# Patient Record
Sex: MALE | Race: WHITE | Employment: OTHER | URBAN - NONMETROPOLITAN AREA
[De-identification: names, ages, dates, MRNs, and addresses within clinical notes are randomized per-mention and may not be internally consistent; named-entity substitution may affect disease eponyms.]

---

## 2017-01-19 ENCOUNTER — PROCEDURE VISIT (OUTPATIENT)
Dept: CARDIOLOGY | Age: 77
End: 2017-01-19

## 2017-01-19 DIAGNOSIS — Z95.0 S/P CARDIAC PACEMAKER PROCEDURE: Primary | ICD-10-CM

## 2017-01-19 PROCEDURE — 93296 REM INTERROG EVL PM/IDS: CPT | Performed by: INTERNAL MEDICINE

## 2017-01-19 PROCEDURE — 93294 REM INTERROG EVL PM/LDLS PM: CPT | Performed by: INTERNAL MEDICINE

## 2017-04-13 LAB
ALBUMIN SERPL-MCNC: 4.3 G/DL (ref 3.2–5.3)
ALK PHOSPHATASE: 93 IU/L (ref 35–121)
ALT SERPL-CCNC: 27 IU/L (ref 5–59)
ANION GAP SERPL CALCULATED.3IONS-SCNC: 11 MMOL/L
AST SERPL-CCNC: 31 IU/L (ref 10–42)
BILIRUB SERPL-MCNC: 1.1 MG/DL (ref 0.2–1.3)
BILIRUBIN DIRECT: 0.3 MG/DL (ref 0–0.2)
BUN BLDV-MCNC: 17 MG/DL (ref 10–25)
CALCIUM SERPL-MCNC: 9.8 MG/DL (ref 8.7–10.8)
CHLORIDE BLD-SCNC: 107 MMOL/L (ref 95–111)
CHOLESTEROL/HDL RATIO: 2.3
CHOLESTEROL: 115 MG/DL
CO2: 31 MMOL/L (ref 21–32)
CREAT SERPL-MCNC: 1 MG/DL (ref 0.7–1.5)
EGFR AFRICAN AMERICAN: 88
EGFR IF NONAFRICAN AMERICAN: 73
GLUCOSE: 89 MG/DL (ref 70–100)
HDLC SERPL-MCNC: 51 MG/DL (ref 40–60)
LDL CHOLESTEROL CALCULATED: 57 MG/DL
LDL/HDL RATIO: 1.1
MAGNESIUM: 2.3 MG/DL (ref 1.7–2.4)
POTASSIUM SERPL-SCNC: 5.6 MMOL/L (ref 3.5–5.4)
SODIUM BLD-SCNC: 143 MMOL/L (ref 134–147)
TOTAL PROTEIN: 6.5 G/DL (ref 5.8–8)
TRIGL SERPL-MCNC: 36 MG/DL
VLDLC SERPL CALC-MCNC: 7 MG/DL

## 2017-04-20 ENCOUNTER — PROCEDURE VISIT (OUTPATIENT)
Dept: CARDIOLOGY | Age: 77
End: 2017-04-20

## 2017-04-20 DIAGNOSIS — Z95.0 S/P CARDIAC PACEMAKER PROCEDURE: Primary | ICD-10-CM

## 2017-04-20 PROCEDURE — 93296 REM INTERROG EVL PM/IDS: CPT | Performed by: INTERNAL MEDICINE

## 2017-04-20 PROCEDURE — 93294 REM INTERROG EVL PM/LDLS PM: CPT | Performed by: INTERNAL MEDICINE

## 2017-04-25 ENCOUNTER — OFFICE VISIT (OUTPATIENT)
Dept: CARDIOLOGY | Age: 77
End: 2017-04-25

## 2017-04-25 VITALS
WEIGHT: 220.8 LBS | HEIGHT: 71 IN | SYSTOLIC BLOOD PRESSURE: 168 MMHG | HEART RATE: 64 BPM | DIASTOLIC BLOOD PRESSURE: 88 MMHG | BODY MASS INDEX: 30.91 KG/M2

## 2017-04-25 DIAGNOSIS — I50.32 DIASTOLIC CHF, CHRONIC (HCC): Primary | ICD-10-CM

## 2017-04-25 DIAGNOSIS — E78.00 PURE HYPERCHOLESTEROLEMIA: ICD-10-CM

## 2017-04-25 DIAGNOSIS — I10 ESSENTIAL HYPERTENSION: ICD-10-CM

## 2017-04-25 DIAGNOSIS — I49.5 SSS (SICK SINUS SYNDROME) (HCC): ICD-10-CM

## 2017-04-25 DIAGNOSIS — I48.19 PERSISTENT ATRIAL FIBRILLATION (HCC): ICD-10-CM

## 2017-04-25 DIAGNOSIS — Z95.0 S/P CARDIAC PACEMAKER PROCEDURE: ICD-10-CM

## 2017-04-25 DIAGNOSIS — E87.5 HYPERKALEMIA: ICD-10-CM

## 2017-04-25 PROCEDURE — 1036F TOBACCO NON-USER: CPT | Performed by: INTERNAL MEDICINE

## 2017-04-25 PROCEDURE — 4040F PNEUMOC VAC/ADMIN/RCVD: CPT | Performed by: INTERNAL MEDICINE

## 2017-04-25 PROCEDURE — 1123F ACP DISCUSS/DSCN MKR DOCD: CPT | Performed by: INTERNAL MEDICINE

## 2017-04-25 PROCEDURE — 3017F COLORECTAL CA SCREEN DOC REV: CPT | Performed by: INTERNAL MEDICINE

## 2017-04-25 PROCEDURE — 99214 OFFICE O/P EST MOD 30 MIN: CPT | Performed by: INTERNAL MEDICINE

## 2017-04-25 PROCEDURE — G8419 CALC BMI OUT NRM PARAM NOF/U: HCPCS | Performed by: INTERNAL MEDICINE

## 2017-04-25 PROCEDURE — G8427 DOCREV CUR MEDS BY ELIG CLIN: HCPCS | Performed by: INTERNAL MEDICINE

## 2017-04-25 RX ORDER — FUROSEMIDE 20 MG/1
20 TABLET ORAL DAILY
Qty: 60 TABLET | Refills: 5
Start: 2017-04-25 | End: 2017-09-26 | Stop reason: SDUPTHER

## 2017-04-27 ENCOUNTER — TELEPHONE (OUTPATIENT)
Dept: CARDIOLOGY | Age: 77
End: 2017-04-27

## 2017-05-02 LAB
ANION GAP SERPL CALCULATED.3IONS-SCNC: 12 MMOL/L
BUN BLDV-MCNC: 19 MG/DL
BUN BLDV-MCNC: 19 MG/DL (ref 10–25)
CALCIUM SERPL-MCNC: 9.9 MG/DL
CALCIUM SERPL-MCNC: 9.9 MG/DL (ref 8.7–10.8)
CHLORIDE BLD-SCNC: 106 MMOL/L
CHLORIDE BLD-SCNC: 106 MMOL/L (ref 95–111)
CO2: 30 MMOL/L
CO2: 30 MMOL/L (ref 21–32)
CREAT SERPL-MCNC: 1 MG/DL
CREAT SERPL-MCNC: 1 MG/DL (ref 0.7–1.5)
EGFR AFRICAN AMERICAN: 88
EGFR IF NONAFRICAN AMERICAN: 73
GFR CALCULATED: NORMAL
GLUCOSE BLD-MCNC: 93 MG/DL
GLUCOSE: 93 MG/DL (ref 70–100)
POTASSIUM SERPL-SCNC: 4.8 MMOL/L
POTASSIUM SERPL-SCNC: 4.8 MMOL/L (ref 3.5–5.4)
SODIUM BLD-SCNC: 143 MMOL/L
SODIUM BLD-SCNC: 143 MMOL/L (ref 134–147)

## 2017-05-19 RX ORDER — APIXABAN 5 MG/1
TABLET, FILM COATED ORAL
Qty: 180 TABLET | Refills: 0 | Status: SHIPPED | OUTPATIENT
Start: 2017-05-19 | End: 2017-11-06 | Stop reason: SDUPTHER

## 2017-06-28 DIAGNOSIS — E78.5 HYPERLIPIDEMIA: ICD-10-CM

## 2017-06-28 RX ORDER — METOPROLOL TARTRATE 50 MG/1
TABLET, FILM COATED ORAL
Qty: 180 TABLET | Refills: 0 | Status: SHIPPED | OUTPATIENT
Start: 2017-06-28 | End: 2017-09-26 | Stop reason: SDUPTHER

## 2017-06-28 RX ORDER — POTASSIUM CHLORIDE 20 MEQ/1
TABLET, EXTENDED RELEASE ORAL
Qty: 90 TABLET | Refills: 0 | Status: SHIPPED | OUTPATIENT
Start: 2017-06-28 | End: 2017-08-28 | Stop reason: ALTCHOICE

## 2017-06-28 RX ORDER — SIMVASTATIN 10 MG
TABLET ORAL
Qty: 90 TABLET | Refills: 0 | Status: SHIPPED | OUTPATIENT
Start: 2017-06-28 | End: 2017-09-26 | Stop reason: SDUPTHER

## 2017-06-28 RX ORDER — FUROSEMIDE 20 MG/1
TABLET ORAL
Qty: 90 TABLET | Refills: 0 | Status: SHIPPED | OUTPATIENT
Start: 2017-06-28 | End: 2017-08-28 | Stop reason: SDUPTHER

## 2017-07-12 ENCOUNTER — TELEPHONE (OUTPATIENT)
Dept: CARDIOLOGY | Age: 77
End: 2017-07-12

## 2017-07-26 ENCOUNTER — PROCEDURE VISIT (OUTPATIENT)
Dept: CARDIOLOGY CLINIC | Age: 77
End: 2017-07-26
Payer: MEDICARE

## 2017-07-26 DIAGNOSIS — Z95.0 S/P CARDIAC PACEMAKER PROCEDURE: Primary | ICD-10-CM

## 2017-07-26 PROCEDURE — 93294 REM INTERROG EVL PM/LDLS PM: CPT | Performed by: INTERNAL MEDICINE

## 2017-07-26 PROCEDURE — 93296 REM INTERROG EVL PM/IDS: CPT | Performed by: INTERNAL MEDICINE

## 2017-08-01 ENCOUNTER — TELEPHONE (OUTPATIENT)
Dept: FAMILY MEDICINE CLINIC | Age: 77
End: 2017-08-01

## 2017-08-01 RX ORDER — FAMCICLOVIR 500 MG/1
500 TABLET, FILM COATED ORAL 3 TIMES DAILY PRN
Qty: 270 TABLET | Refills: 1 | Status: SHIPPED | OUTPATIENT
Start: 2017-08-01 | End: 2019-06-04

## 2017-08-28 ENCOUNTER — OFFICE VISIT (OUTPATIENT)
Dept: CARDIOLOGY CLINIC | Age: 77
End: 2017-08-28
Payer: MEDICARE

## 2017-08-28 VITALS
SYSTOLIC BLOOD PRESSURE: 168 MMHG | HEIGHT: 71 IN | BODY MASS INDEX: 29.4 KG/M2 | HEART RATE: 56 BPM | WEIGHT: 210 LBS | DIASTOLIC BLOOD PRESSURE: 80 MMHG

## 2017-08-28 DIAGNOSIS — Z95.0 S/P CARDIAC PACEMAKER PROCEDURE: ICD-10-CM

## 2017-08-28 DIAGNOSIS — I50.31 DIASTOLIC CHF, ACUTE (HCC): Primary | ICD-10-CM

## 2017-08-28 DIAGNOSIS — I49.5 SSS (SICK SINUS SYNDROME) (HCC): ICD-10-CM

## 2017-08-28 DIAGNOSIS — I48.19 PERSISTENT ATRIAL FIBRILLATION (HCC): ICD-10-CM

## 2017-08-28 DIAGNOSIS — E78.00 PURE HYPERCHOLESTEROLEMIA: ICD-10-CM

## 2017-08-28 DIAGNOSIS — I10 ESSENTIAL HYPERTENSION: ICD-10-CM

## 2017-08-28 DIAGNOSIS — R06.02 SOB (SHORTNESS OF BREATH) ON EXERTION: ICD-10-CM

## 2017-08-28 PROCEDURE — G8417 CALC BMI ABV UP PARAM F/U: HCPCS | Performed by: INTERNAL MEDICINE

## 2017-08-28 PROCEDURE — G8427 DOCREV CUR MEDS BY ELIG CLIN: HCPCS | Performed by: INTERNAL MEDICINE

## 2017-08-28 PROCEDURE — 4040F PNEUMOC VAC/ADMIN/RCVD: CPT | Performed by: INTERNAL MEDICINE

## 2017-08-28 PROCEDURE — 99214 OFFICE O/P EST MOD 30 MIN: CPT | Performed by: INTERNAL MEDICINE

## 2017-08-28 PROCEDURE — 1036F TOBACCO NON-USER: CPT | Performed by: INTERNAL MEDICINE

## 2017-08-28 PROCEDURE — 1123F ACP DISCUSS/DSCN MKR DOCD: CPT | Performed by: INTERNAL MEDICINE

## 2017-08-28 PROCEDURE — 3017F COLORECTAL CA SCREEN DOC REV: CPT | Performed by: INTERNAL MEDICINE

## 2017-08-29 LAB
ANION GAP SERPL CALCULATED.3IONS-SCNC: 16 MMOL/L
BUN BLDV-MCNC: 14 MG/DL (ref 10–25)
CALCIUM SERPL-MCNC: 9.5 MG/DL (ref 8.7–10.8)
CHLORIDE BLD-SCNC: 102 MMOL/L (ref 95–111)
CO2: 29 MMOL/L (ref 21–32)
CREAT SERPL-MCNC: 0.9 MG/DL (ref 0.7–1.5)
EGFR AFRICAN AMERICAN: 100
EGFR IF NONAFRICAN AMERICAN: 82
GLUCOSE: 84 MG/DL (ref 70–100)
POTASSIUM SERPL-SCNC: 4.4 MMOL/L (ref 3.5–5.4)
SODIUM BLD-SCNC: 143 MMOL/L (ref 134–147)

## 2017-09-26 DIAGNOSIS — E78.5 HYPERLIPIDEMIA: ICD-10-CM

## 2017-09-26 DIAGNOSIS — I10 ESSENTIAL HYPERTENSION: ICD-10-CM

## 2017-09-26 RX ORDER — METOPROLOL TARTRATE 50 MG/1
TABLET, FILM COATED ORAL
Qty: 180 TABLET | Refills: 2 | Status: SHIPPED | OUTPATIENT
Start: 2017-09-26 | End: 2018-05-01 | Stop reason: SDUPTHER

## 2017-09-26 RX ORDER — FUROSEMIDE 20 MG/1
TABLET ORAL
Qty: 90 TABLET | Refills: 2 | Status: SHIPPED | OUTPATIENT
Start: 2017-09-26 | End: 2018-05-01 | Stop reason: SDUPTHER

## 2017-09-26 RX ORDER — VALSARTAN 80 MG/1
TABLET ORAL
Qty: 90 TABLET | Refills: 3 | Status: SHIPPED | OUTPATIENT
Start: 2017-09-26 | End: 2018-05-01 | Stop reason: SDUPTHER

## 2017-09-26 RX ORDER — SIMVASTATIN 10 MG
TABLET ORAL
Qty: 90 TABLET | Refills: 2 | Status: SHIPPED | OUTPATIENT
Start: 2017-09-26 | End: 2018-05-01 | Stop reason: SDUPTHER

## 2017-10-16 ENCOUNTER — OFFICE VISIT (OUTPATIENT)
Dept: PULMONOLOGY | Age: 77
End: 2017-10-16
Payer: MEDICARE

## 2017-10-16 VITALS
SYSTOLIC BLOOD PRESSURE: 137 MMHG | BODY MASS INDEX: 30.52 KG/M2 | DIASTOLIC BLOOD PRESSURE: 78 MMHG | HEIGHT: 71 IN | RESPIRATION RATE: 16 BRPM | HEART RATE: 69 BPM | WEIGHT: 218 LBS | OXYGEN SATURATION: 96 %

## 2017-10-16 DIAGNOSIS — G47.33 OBSTRUCTIVE SLEEP APNEA ON CPAP: Primary | ICD-10-CM

## 2017-10-16 DIAGNOSIS — Z99.89 OBSTRUCTIVE SLEEP APNEA ON CPAP: Primary | ICD-10-CM

## 2017-10-16 PROCEDURE — 99213 OFFICE O/P EST LOW 20 MIN: CPT | Performed by: PHYSICIAN ASSISTANT

## 2017-10-16 PROCEDURE — G8417 CALC BMI ABV UP PARAM F/U: HCPCS | Performed by: PHYSICIAN ASSISTANT

## 2017-10-16 PROCEDURE — G8484 FLU IMMUNIZE NO ADMIN: HCPCS | Performed by: PHYSICIAN ASSISTANT

## 2017-10-16 PROCEDURE — 3017F COLORECTAL CA SCREEN DOC REV: CPT | Performed by: PHYSICIAN ASSISTANT

## 2017-10-16 PROCEDURE — 4040F PNEUMOC VAC/ADMIN/RCVD: CPT | Performed by: PHYSICIAN ASSISTANT

## 2017-10-16 PROCEDURE — G8427 DOCREV CUR MEDS BY ELIG CLIN: HCPCS | Performed by: PHYSICIAN ASSISTANT

## 2017-10-16 PROCEDURE — 1123F ACP DISCUSS/DSCN MKR DOCD: CPT | Performed by: PHYSICIAN ASSISTANT

## 2017-10-16 PROCEDURE — 1036F TOBACCO NON-USER: CPT | Performed by: PHYSICIAN ASSISTANT

## 2017-10-16 NOTE — PROGRESS NOTES
[facility]      Daija Washington                                         [id number]  10/16/2017   Chief Complaint   Patient presents with    Sleep Apnea     LOVE on CPAP- 13 mth f/u with D/L. Needs new machine. Tank leaks. Pt of Dr. Christie Mireles    PAP Download:   Original or initial  AHI: 19.6     Date of initial study: 8/6/13    [x] Compliant  100%   []  Noncompliant 0 %     PAP Type CPAP   Level  11.0 cmH2O   Avg Hrs/Day 7:59:43  AHI: 0.2   Recorded compliance dates, 9/16/17 to 10/15/17  Machine/Mfg: Respironics Interface: Nasal    Provider:  [x][facility]  [][facility]  [][facility]  [][facility]         [][facility] []Other:   Neck Size: 17.25  Mallampati 3  ESS: 8    Here is a scan of the most recent download:              Presentation:   Madelyn Grewal presents for sleep medicine follow up for obstructive sleep apnea  Since the last visit, Madelyn Grewal is doing reasonably well with their sleep machine. Other comments: He is doing well with PAP. Tank keeps leaking. His machine is loud. Equipment issues: The pressure is  acceptable, the mask is acceptable and he  is  using the humidity. Progress History:   Since last visit any new medical issues? No  New ER or hospitlal visits? No  Any new or changes in medicines? No  Any new sleep medicines? No    Sleep issues:  Do you feel better? Yes  More rested? Yes   Better concentration? yes      Past Medical History:   Diagnosis Date    Adenomatous colon polyp 2012    Alzheimer disease     early stage    Antral gastritis 7/2012    Arthritis     Atrial fibrillation ([facility] 2013    AV block, [place] 1 10/31/2013    Blood transfusion reaction     Cancer ([facility] [address] 2010    SKIN CANCER NECK    CHF (congestive heart failure) ([facility] [address]     GERD with stricture 7/2012    Hyperlipidemia 2008    Hypertension 2005    Nausea & vomiting     LOVE on CPAP     Rheumatic fever 1950's    S/P cardiac pacemaker procedure: 10/31/2013: [address] Dual chamber.

## 2017-10-18 ENCOUNTER — NURSE ONLY (OUTPATIENT)
Dept: CARDIOLOGY CLINIC | Age: 77
End: 2017-10-18
Payer: MEDICARE

## 2017-10-18 DIAGNOSIS — Z95.0 S/P CARDIAC PACEMAKER PROCEDURE: Primary | ICD-10-CM

## 2017-10-18 PROCEDURE — 93279 PRGRMG DEV EVAL PM/LDLS PM: CPT | Performed by: INTERNAL MEDICINE

## 2017-10-18 NOTE — PROGRESS NOTES
[place] pt  Dual pacer programmed VVI for chronic at fib/eliquis  imped 706 threshold 1.1 @ 0.4  100% paced     Has latitude monitoring at home, headed to [place]

## 2018-01-29 ENCOUNTER — PROCEDURE VISIT (OUTPATIENT)
Dept: CARDIOLOGY CLINIC | Age: 78
End: 2018-01-29
Payer: MEDICARE

## 2018-01-29 DIAGNOSIS — Z95.0 S/P CARDIAC PACEMAKER PROCEDURE: Primary | ICD-10-CM

## 2018-01-29 PROCEDURE — 93296 REM INTERROG EVL PM/IDS: CPT | Performed by: INTERNAL MEDICINE

## 2018-01-29 PROCEDURE — 93294 REM INTERROG EVL PM/LDLS PM: CPT | Performed by: INTERNAL MEDICINE

## 2018-04-16 DIAGNOSIS — K29.50 ANTRAL GASTRITIS: ICD-10-CM

## 2018-04-16 RX ORDER — OMEPRAZOLE 20 MG/1
40 CAPSULE, DELAYED RELEASE ORAL DAILY
Qty: 90 CAPSULE | Refills: 3 | Status: SHIPPED | OUTPATIENT
Start: 2018-04-16 | End: 2018-05-02 | Stop reason: SDUPTHER

## 2018-05-01 ENCOUNTER — OFFICE VISIT (OUTPATIENT)
Dept: CARDIOLOGY CLINIC | Age: 78
End: 2018-05-01
Payer: MEDICARE

## 2018-05-01 ENCOUNTER — OFFICE VISIT (OUTPATIENT)
Dept: FAMILY MEDICINE CLINIC | Age: 78
End: 2018-05-01

## 2018-05-01 VITALS
BODY MASS INDEX: 32.34 KG/M2 | SYSTOLIC BLOOD PRESSURE: 136 MMHG | WEIGHT: 231 LBS | DIASTOLIC BLOOD PRESSURE: 88 MMHG | HEIGHT: 71 IN | HEART RATE: 60 BPM

## 2018-05-01 VITALS
RESPIRATION RATE: 16 BRPM | HEIGHT: 71 IN | WEIGHT: 229 LBS | SYSTOLIC BLOOD PRESSURE: 132 MMHG | BODY MASS INDEX: 32.06 KG/M2 | DIASTOLIC BLOOD PRESSURE: 60 MMHG | HEART RATE: 72 BPM

## 2018-05-01 DIAGNOSIS — R06.02 SOB (SHORTNESS OF BREATH) ON EXERTION: ICD-10-CM

## 2018-05-01 DIAGNOSIS — I50.32 DIASTOLIC CHF, CHRONIC (HCC): ICD-10-CM

## 2018-05-01 DIAGNOSIS — Z13.6 SCREENING FOR AAA (ABDOMINAL AORTIC ANEURYSM): ICD-10-CM

## 2018-05-01 DIAGNOSIS — I50.31 DIASTOLIC CHF, ACUTE (HCC): Primary | ICD-10-CM

## 2018-05-01 DIAGNOSIS — E78.00 PURE HYPERCHOLESTEROLEMIA: ICD-10-CM

## 2018-05-01 DIAGNOSIS — Z00.00 ROUTINE GENERAL MEDICAL EXAMINATION AT A HEALTH CARE FACILITY: Primary | ICD-10-CM

## 2018-05-01 DIAGNOSIS — I48.20 CHRONIC ATRIAL FIBRILLATION (HCC): ICD-10-CM

## 2018-05-01 DIAGNOSIS — Z95.0 S/P CARDIAC PACEMAKER PROCEDURE: ICD-10-CM

## 2018-05-01 DIAGNOSIS — I10 ESSENTIAL HYPERTENSION: ICD-10-CM

## 2018-05-01 PROCEDURE — 99214 OFFICE O/P EST MOD 30 MIN: CPT | Performed by: INTERNAL MEDICINE

## 2018-05-01 PROCEDURE — 93000 ELECTROCARDIOGRAM COMPLETE: CPT | Performed by: INTERNAL MEDICINE

## 2018-05-01 PROCEDURE — G0438 PPPS, INITIAL VISIT: HCPCS | Performed by: FAMILY MEDICINE

## 2018-05-01 RX ORDER — FUROSEMIDE 20 MG/1
TABLET ORAL
Qty: 90 TABLET | Refills: 3 | Status: SHIPPED | OUTPATIENT
Start: 2018-05-01 | End: 2020-12-17 | Stop reason: SDUPTHER

## 2018-05-01 RX ORDER — VALSARTAN 80 MG/1
TABLET ORAL
Qty: 90 TABLET | Refills: 3 | Status: SHIPPED | OUTPATIENT
Start: 2018-05-01 | End: 2018-07-23 | Stop reason: ALTCHOICE

## 2018-05-01 RX ORDER — GLUCOSAMINE/CHONDR SU A SOD 750-600 MG
TABLET ORAL
COMMUNITY
End: 2019-04-12

## 2018-05-01 RX ORDER — POTASSIUM CHLORIDE 20 MEQ/1
20 TABLET, EXTENDED RELEASE ORAL 2 TIMES DAILY
COMMUNITY
End: 2018-05-01 | Stop reason: ALTCHOICE

## 2018-05-01 RX ORDER — METOPROLOL TARTRATE 50 MG/1
TABLET, FILM COATED ORAL
Qty: 180 TABLET | Refills: 3 | Status: SHIPPED | OUTPATIENT
Start: 2018-05-01 | End: 2019-05-27 | Stop reason: SDUPTHER

## 2018-05-01 RX ORDER — SIMVASTATIN 10 MG
TABLET ORAL
Qty: 90 TABLET | Refills: 3 | Status: SHIPPED | OUTPATIENT
Start: 2018-05-01 | End: 2019-05-27 | Stop reason: SDUPTHER

## 2018-05-01 ASSESSMENT — LIFESTYLE VARIABLES
AUDIT-C TOTAL SCORE: 4
HOW OFTEN DO YOU HAVE A DRINK CONTAINING ALCOHOL: 4
HAVE YOU OR SOMEONE ELSE BEEN INJURED AS A RESULT OF YOUR DRINKING: 0
HOW OFTEN DURING THE LAST YEAR HAVE YOU FOUND THAT YOU WERE NOT ABLE TO STOP DRINKING ONCE YOU HAD STARTED: 0
HAS A RELATIVE, FRIEND, DOCTOR, OR ANOTHER HEALTH PROFESSIONAL EXPRESSED CONCERN ABOUT YOUR DRINKING OR SUGGESTED YOU CUT DOWN: 0
HOW MANY STANDARD DRINKS CONTAINING ALCOHOL DO YOU HAVE ON A TYPICAL DAY: 0
HOW OFTEN DO YOU HAVE SIX OR MORE DRINKS ON ONE OCCASION: 0
HOW OFTEN DURING THE LAST YEAR HAVE YOU HAD A FEELING OF GUILT OR REMORSE AFTER DRINKING: 0
HOW OFTEN DURING THE LAST YEAR HAVE YOU NEEDED AN ALCOHOLIC DRINK FIRST THING IN THE MORNING TO GET YOURSELF GOING AFTER A NIGHT OF HEAVY DRINKING: 0
HOW OFTEN DURING THE LAST YEAR HAVE YOU BEEN UNABLE TO REMEMBER WHAT HAPPENED THE NIGHT BEFORE BECAUSE YOU HAD BEEN DRINKING: 0
AUDIT TOTAL SCORE: 4
HOW OFTEN DURING THE LAST YEAR HAVE YOU FAILED TO DO WHAT WAS NORMALLY EXPECTED FROM YOU BECAUSE OF DRINKING: 0

## 2018-05-01 ASSESSMENT — ANXIETY QUESTIONNAIRES: GAD7 TOTAL SCORE: 0

## 2018-05-01 ASSESSMENT — PATIENT HEALTH QUESTIONNAIRE - PHQ9: SUM OF ALL RESPONSES TO PHQ QUESTIONS 1-9: 0

## 2018-05-02 ENCOUNTER — PROCEDURE VISIT (OUTPATIENT)
Dept: CARDIOLOGY CLINIC | Age: 78
End: 2018-05-02

## 2018-05-02 DIAGNOSIS — Z95.0 S/P CARDIAC PACEMAKER PROCEDURE: Primary | ICD-10-CM

## 2018-05-02 DIAGNOSIS — K29.50 ANTRAL GASTRITIS: ICD-10-CM

## 2018-05-02 RX ORDER — OMEPRAZOLE 40 MG/1
40 CAPSULE, DELAYED RELEASE ORAL DAILY
Qty: 90 CAPSULE | Refills: 3 | Status: SHIPPED | OUTPATIENT
Start: 2018-05-02 | End: 2019-06-17 | Stop reason: SDUPTHER

## 2018-05-10 ENCOUNTER — HOSPITAL ENCOUNTER (OUTPATIENT)
Dept: ULTRASOUND IMAGING | Age: 78
Discharge: HOME | End: 2018-05-10
Payer: MEDICARE

## 2018-05-10 DIAGNOSIS — Z13.6 SCREENING FOR AAA (ABDOMINAL AORTIC ANEURYSM): ICD-10-CM

## 2018-05-10 PROCEDURE — 76706 US ABDL AORTA SCREEN AAA: CPT

## 2018-05-13 DIAGNOSIS — I71.40 ABDOMINAL AORTIC ANEURYSM (AAA) WITHOUT RUPTURE: ICD-10-CM

## 2018-05-14 ENCOUNTER — OFFICE VISIT (OUTPATIENT)
Dept: FAMILY MEDICINE CLINIC | Age: 78
End: 2018-05-14

## 2018-05-14 ENCOUNTER — TELEPHONE (OUTPATIENT)
Dept: FAMILY MEDICINE CLINIC | Age: 78
End: 2018-05-14

## 2018-05-14 VITALS
BODY MASS INDEX: 30.85 KG/M2 | HEIGHT: 71 IN | RESPIRATION RATE: 16 BRPM | SYSTOLIC BLOOD PRESSURE: 150 MMHG | WEIGHT: 220.38 LBS | DIASTOLIC BLOOD PRESSURE: 78 MMHG

## 2018-05-14 DIAGNOSIS — J06.9 UPPER RESPIRATORY TRACT INFECTION, UNSPECIFIED TYPE: ICD-10-CM

## 2018-05-14 DIAGNOSIS — I71.40 ABDOMINAL AORTIC ANEURYSM (AAA) WITHOUT RUPTURE: ICD-10-CM

## 2018-05-14 DIAGNOSIS — I10 ESSENTIAL HYPERTENSION: Primary | ICD-10-CM

## 2018-05-14 PROCEDURE — 99214 OFFICE O/P EST MOD 30 MIN: CPT | Performed by: FAMILY MEDICINE

## 2018-05-14 ASSESSMENT — ENCOUNTER SYMPTOMS
COUGH: 1
SINUS PRESSURE: 1
RHINORRHEA: 1
SINUS PAIN: 1
DIARRHEA: 0
SORE THROAT: 1
SHORTNESS OF BREATH: 0

## 2018-05-14 NOTE — PROGRESS NOTES
Subjective:      Patient ID: Favian Kim is a 78 y.o. male. HPI  1. He has been ill the past several days. 2. We discussed the AAA and follow up of that  Review of Systems   Constitutional: Positive for chills. Negative for fever. HENT: Positive for congestion, postnasal drip, rhinorrhea, sinus pain, sinus pressure and sore throat. Negative for ear pain. Eyes: Negative for visual disturbance. Respiratory: Positive for cough. Negative for shortness of breath. Cardiovascular: Negative for chest pain. Gastrointestinal: Negative for diarrhea. Genitourinary: Negative. Musculoskeletal: Negative for arthralgias and myalgias. Skin: Negative for rash. Neurological: Negative for headaches. The patient's medications, allergies, past medical problems, surgical, social, and family histories were reviewed and updated as needed. Objective:   Physical Exam   Constitutional: He is oriented to person, place, and time. He appears well-developed and well-nourished. No distress. HENT:   Head: Normocephalic and atraumatic. Right Ear: External ear normal.   Left Ear: External ear normal.   Nose: Nose normal.   Mouth/Throat: Oropharynx is clear and moist. No oropharyngeal exudate. Eyes: Conjunctivae are normal. No scleral icterus. Neck: Neck supple. Carotid bruit is not present. No tracheal deviation present. No thyromegaly present. Cardiovascular: Normal rate, regular rhythm, normal heart sounds and intact distal pulses. Pulmonary/Chest: Effort normal and breath sounds normal.   Abdominal: Soft. Bowel sounds are normal. He exhibits no mass. Hernia confirmed negative in the right inguinal area and confirmed negative in the left inguinal area. Genitourinary: Rectum normal, prostate normal, testes normal and penis normal.   Genitourinary Comments: No stool returned   Musculoskeletal: He exhibits no edema. Lymphadenopathy:     He has no cervical adenopathy.    Neurological: He is alert and oriented to person, place, and time. Skin: Skin is warm and dry. Psychiatric: He has a normal mood and affect. His behavior is normal.   Blood pressure (!) 150/78, resp. rate 16, height 5' 11\" (1.803 m), weight 220 lb 6 oz (100 kg). Assessment:       Diagnosis Orders   1. Essential hypertension     2.  Upper respiratory tract infection, unspecified type             Plan:      See me in October

## 2018-07-20 ENCOUNTER — TELEPHONE (OUTPATIENT)
Dept: CARDIOLOGY | Age: 78
End: 2018-07-20

## 2018-07-20 NOTE — TELEPHONE ENCOUNTER
7/20/18  Patient checking on what to do with the Valsartan recall? Please advise (verbal order to speak to spouse Saul Mix).   [employer]  Thanks/blm  Dolv: 5/1/18  Donv: 10/29/18

## 2018-07-23 RX ORDER — IRBESARTAN 75 MG/1
75 TABLET ORAL NIGHTLY
Qty: 90 TABLET | Refills: 3 | Status: SHIPPED | OUTPATIENT
Start: 2018-07-23 | End: 2019-04-12

## 2018-07-23 RX ORDER — IRBESARTAN 75 MG/1
75 TABLET ORAL NIGHTLY
COMMUNITY
End: 2018-07-23 | Stop reason: SDUPTHER

## 2018-07-23 NOTE — TELEPHONE ENCOUNTER
Spoke with patient. Patient voiced understanding. Med list updated. Med pended in a separate encounter.

## 2018-08-01 LAB
ALBUMIN SERPL-MCNC: 4.6 G/DL (ref 3.5–5.2)
ALK PHOSPHATASE: 89 U/L (ref 39–118)
ALT SERPL-CCNC: 21 U/L (ref 5–50)
ANION GAP SERPL CALCULATED.3IONS-SCNC: 10 MEQ/L (ref 10–19)
AST SERPL-CCNC: 28 U/L (ref 9–50)
BILIRUB SERPL-MCNC: 0.9 MG/DL
BILIRUBIN DIRECT: 0.2 MG/DL
BUN BLDV-MCNC: 13 MG/DL (ref 8–23)
CALCIUM SERPL-MCNC: 9.4 MG/DL (ref 8.5–10.5)
CHLORIDE BLD-SCNC: 102 MEQ/L (ref 95–107)
CHOLESTEROL/HDL RATIO: 3.1
CHOLESTEROL: 135 MG/DL
CO2: 31 MEQ/L (ref 19–31)
CREAT SERPL-MCNC: 1 MG/DL (ref 0.8–1.4)
EGFR AFRICAN AMERICAN: 85 ML/MIN/1.73 M2
EGFR IF NONAFRICAN AMERICAN: 73.3 ML/MIN/1.73 M2
GLUCOSE: 93 MG/DL (ref 70–99)
HDLC SERPL-MCNC: 44 MG/DL
LDL CHOLESTEROL CALCULATED: 77 MG/DL
LDL/HDL RATIO: 1.8
MAGNESIUM: 2.3 MG/DL (ref 1.6–2.6)
POTASSIUM SERPL-SCNC: 5 MEQ/L (ref 3.5–5.4)
SODIUM BLD-SCNC: 143 MEQ/L (ref 135–146)
TOTAL PROTEIN: 6.8 G/DL (ref 6.1–8.3)
TRIGL SERPL-MCNC: 68 MG/DL
VLDLC SERPL CALC-MCNC: 14 MG/DL

## 2018-08-10 ENCOUNTER — PROCEDURE VISIT (OUTPATIENT)
Dept: CARDIOLOGY CLINIC | Age: 78
End: 2018-08-10
Payer: MEDICARE

## 2018-08-10 DIAGNOSIS — Z95.0 S/P CARDIAC PACEMAKER PROCEDURE: Primary | ICD-10-CM

## 2018-08-10 PROCEDURE — 93294 REM INTERROG EVL PM/LDLS PM: CPT | Performed by: INTERNAL MEDICINE

## 2018-08-10 PROCEDURE — 93296 REM INTERROG EVL PM/IDS: CPT | Performed by: INTERNAL MEDICINE

## 2018-10-01 ENCOUNTER — CARE COORDINATION (OUTPATIENT)
Dept: CARE COORDINATION | Age: 78
End: 2018-10-01

## 2018-10-16 ENCOUNTER — OFFICE VISIT (OUTPATIENT)
Dept: PULMONOLOGY | Age: 78
End: 2018-10-16
Payer: MEDICARE

## 2018-10-16 ENCOUNTER — NURSE ONLY (OUTPATIENT)
Dept: CARDIOLOGY CLINIC | Age: 78
End: 2018-10-16
Payer: MEDICARE

## 2018-10-16 VITALS
HEART RATE: 60 BPM | OXYGEN SATURATION: 98 % | DIASTOLIC BLOOD PRESSURE: 86 MMHG | SYSTOLIC BLOOD PRESSURE: 134 MMHG | BODY MASS INDEX: 29.82 KG/M2 | WEIGHT: 213 LBS | HEIGHT: 71 IN

## 2018-10-16 DIAGNOSIS — Z95.0 S/P CARDIAC PACEMAKER PROCEDURE: Primary | ICD-10-CM

## 2018-10-16 DIAGNOSIS — G47.33 OBSTRUCTIVE SLEEP APNEA ON CPAP: Primary | ICD-10-CM

## 2018-10-16 DIAGNOSIS — Z99.89 OBSTRUCTIVE SLEEP APNEA ON CPAP: Primary | ICD-10-CM

## 2018-10-16 PROCEDURE — 93279 PRGRMG DEV EVAL PM/LDLS PM: CPT | Performed by: INTERNAL MEDICINE

## 2018-10-16 PROCEDURE — 99213 OFFICE O/P EST LOW 20 MIN: CPT | Performed by: PHYSICIAN ASSISTANT

## 2018-10-16 ASSESSMENT — ENCOUNTER SYMPTOMS
SHORTNESS OF BREATH: 0
CHEST TIGHTNESS: 0
COUGH: 0
ALLERGIC/IMMUNOLOGIC NEGATIVE: 1
EYES NEGATIVE: 1
BACK PAIN: 0
STRIDOR: 0
DIARRHEA: 0
NAUSEA: 0
WHEEZING: 0

## 2018-10-16 NOTE — PATIENT INSTRUCTIONS
Health Maintenance Due   Topic Date Due    DTaP/Tdap/Td vaccine (1 - Tdap) 10/20/1959    Shingles Vaccine (1 of 2 - 2 Dose Series) 11/20/2017

## 2018-10-25 ENCOUNTER — OFFICE VISIT (OUTPATIENT)
Dept: FAMILY MEDICINE CLINIC | Age: 78
End: 2018-10-25

## 2018-10-25 VITALS
RESPIRATION RATE: 14 BRPM | DIASTOLIC BLOOD PRESSURE: 78 MMHG | WEIGHT: 222.13 LBS | BODY MASS INDEX: 31.1 KG/M2 | SYSTOLIC BLOOD PRESSURE: 138 MMHG | HEIGHT: 71 IN | HEART RATE: 60 BPM

## 2018-10-25 DIAGNOSIS — I10 ESSENTIAL HYPERTENSION: Primary | ICD-10-CM

## 2018-10-25 PROCEDURE — 99213 OFFICE O/P EST LOW 20 MIN: CPT | Performed by: FAMILY MEDICINE

## 2018-10-25 ASSESSMENT — ENCOUNTER SYMPTOMS
SINUS PRESSURE: 0
SHORTNESS OF BREATH: 0
CONSTIPATION: 0

## 2018-10-29 ENCOUNTER — OFFICE VISIT (OUTPATIENT)
Dept: CARDIOLOGY CLINIC | Age: 78
End: 2018-10-29
Payer: MEDICARE

## 2018-10-29 VITALS
HEIGHT: 70 IN | HEART RATE: 60 BPM | BODY MASS INDEX: 31.87 KG/M2 | SYSTOLIC BLOOD PRESSURE: 132 MMHG | DIASTOLIC BLOOD PRESSURE: 88 MMHG

## 2018-10-29 DIAGNOSIS — I48.20 CHRONIC ATRIAL FIBRILLATION (HCC): ICD-10-CM

## 2018-10-29 DIAGNOSIS — Z95.0 S/P CARDIAC PACEMAKER PROCEDURE: ICD-10-CM

## 2018-10-29 DIAGNOSIS — R06.02 SOB (SHORTNESS OF BREATH) ON EXERTION: ICD-10-CM

## 2018-10-29 DIAGNOSIS — I10 ESSENTIAL HYPERTENSION: ICD-10-CM

## 2018-10-29 DIAGNOSIS — I50.31 DIASTOLIC CHF, ACUTE (HCC): Primary | ICD-10-CM

## 2018-10-29 PROBLEM — E87.5 HYPERKALEMIA: Status: RESOLVED | Noted: 2017-04-25 | Resolved: 2018-10-29

## 2018-10-29 PROCEDURE — 99214 OFFICE O/P EST MOD 30 MIN: CPT | Performed by: INTERNAL MEDICINE

## 2018-10-29 NOTE — PROGRESS NOTES
or organomegaly  Neurological - alert, oriented, normal speech, no focal findings or movement disorder noted  Musculoskeletal - no joint tenderness, deformity or swelling  Extremities - peripheral pulses normal, no pedal edema, no clubbing or cyanosis  Skin - normal coloration and turgor, no rashes, no suspicious skin lesions noted    Lab  No results for input(s): CKTOTAL, CKMB, CKMBINDEX, TROPONINI in the last 72 hours. CBC:   Lab Results   Component Value Date    WBC 5.6 04/09/2016    WBC 7.8 12/17/2013    RBC 4.63 04/09/2016    HGB 14.6 04/09/2016    HCT 43.6 04/09/2016    MCV 94.1 04/09/2016    MCH 31.5 04/09/2016    MCHC 33.5 04/09/2016    RDW 14.1 04/09/2016     04/09/2016     12/17/2013    MPV 9.3 11/04/2013     BMP:    Lab Results   Component Value Date     07/31/2018    K 5.0 07/31/2018     07/31/2018    CO2 31 07/31/2018    BUN 13 07/31/2018    LABALBU 4.6 07/31/2018    CREATININE 1.0 07/31/2018    CALCIUM 9.4 07/31/2018    LABGLOM 66 06/03/2014    LABGLOM 74 11/04/2013    GLUCOSE 93 07/31/2018     Hepatic Function Panel:    Lab Results   Component Value Date    ALKPHOS 89 07/31/2018    ALKPHOS 83 06/03/2014     Magnesium:    Lab Results   Component Value Date    MG 2.3 07/31/2018     Warfarin PT/INR:    No components found for: PTPATWAR,  PTINRWAR  HgBA1c:    No results found for: LABA1C  FLP:    No components found for: CHLPL,  TRIG,  HDL,  LDLCALC,  LDLDIRECT,  LABVLDL  TSH:    Lab Results   Component Value Date    TSH 1.600 04/09/2016       EKG 6/25/13  Atrial fibrillation   -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 ABNORMAL   holter CONCLUSION:   1. The predominant rhythm appears to be atrial fibrillation with   average rate of 50, maximum is 95, minimal of 26.   2. The patient has 2.2 second and 2.8 second pauses. This pattern   does seem to favor sick sinus syndrome.  May need serial   monitoring if

## 2018-11-01 ENCOUNTER — CARE COORDINATION (OUTPATIENT)
Dept: CARE COORDINATION | Age: 78
End: 2018-11-01

## 2019-01-30 ENCOUNTER — PROCEDURE VISIT (OUTPATIENT)
Dept: CARDIOLOGY CLINIC | Age: 79
End: 2019-01-30
Payer: MEDICARE

## 2019-01-30 DIAGNOSIS — Z95.0 S/P CARDIAC PACEMAKER PROCEDURE: Primary | ICD-10-CM

## 2019-01-30 PROCEDURE — 93296 REM INTERROG EVL PM/IDS: CPT | Performed by: INTERNAL MEDICINE

## 2019-01-30 PROCEDURE — 93294 REM INTERROG EVL PM/LDLS PM: CPT | Performed by: INTERNAL MEDICINE

## 2019-02-27 ENCOUNTER — TELEPHONE (OUTPATIENT)
Dept: FAMILY MEDICINE CLINIC | Age: 79
End: 2019-02-27

## 2019-02-27 ENCOUNTER — TELEPHONE (OUTPATIENT)
Dept: CARDIOLOGY CLINIC | Age: 79
End: 2019-02-27

## 2019-04-08 ENCOUNTER — HOSPITAL ENCOUNTER (OUTPATIENT)
Age: 79
Discharge: HOME | End: 2019-04-08
Payer: MEDICARE

## 2019-04-08 ENCOUNTER — HOSPITAL ENCOUNTER (OUTPATIENT)
Dept: GENERAL RADIOLOGY | Age: 79
Discharge: HOME | End: 2019-04-08
Payer: MEDICARE

## 2019-04-08 DIAGNOSIS — J90 PLEURAL EFFUSION: ICD-10-CM

## 2019-04-08 PROCEDURE — 71046 X-RAY EXAM CHEST 2 VIEWS: CPT

## 2019-04-12 ENCOUNTER — OFFICE VISIT (OUTPATIENT)
Dept: CARDIOLOGY CLINIC | Age: 79
End: 2019-04-12
Payer: MEDICARE

## 2019-04-12 VITALS
DIASTOLIC BLOOD PRESSURE: 94 MMHG | BODY MASS INDEX: 29.82 KG/M2 | HEIGHT: 71 IN | HEART RATE: 59 BPM | SYSTOLIC BLOOD PRESSURE: 156 MMHG | WEIGHT: 213 LBS

## 2019-04-12 DIAGNOSIS — I48.20 CHRONIC ATRIAL FIBRILLATION (HCC): ICD-10-CM

## 2019-04-12 DIAGNOSIS — I50.31 DIASTOLIC CHF, ACUTE (HCC): Primary | ICD-10-CM

## 2019-04-12 DIAGNOSIS — E78.00 PURE HYPERCHOLESTEROLEMIA: ICD-10-CM

## 2019-04-12 DIAGNOSIS — I50.32 DIASTOLIC CHF, CHRONIC (HCC): ICD-10-CM

## 2019-04-12 DIAGNOSIS — Z95.0 S/P CARDIAC PACEMAKER PROCEDURE: ICD-10-CM

## 2019-04-12 DIAGNOSIS — I10 ESSENTIAL HYPERTENSION: ICD-10-CM

## 2019-04-12 DIAGNOSIS — R06.02 SOB (SHORTNESS OF BREATH) ON EXERTION: ICD-10-CM

## 2019-04-12 PROCEDURE — 99214 OFFICE O/P EST MOD 30 MIN: CPT | Performed by: INTERNAL MEDICINE

## 2019-04-12 RX ORDER — IRBESARTAN 150 MG/1
150 TABLET ORAL DAILY
Qty: 30 TABLET | Refills: 5 | Status: SHIPPED | OUTPATIENT
Start: 2019-04-12 | End: 2019-05-02 | Stop reason: SDUPTHER

## 2019-04-12 RX ORDER — POTASSIUM CHLORIDE 750 MG/1
10 TABLET, EXTENDED RELEASE ORAL DAILY
COMMUNITY
End: 2020-12-17 | Stop reason: SDUPTHER

## 2019-04-12 NOTE — PROGRESS NOTES
Comment: USALLY 1 DRINK DAILY    Drug use: No    Sexual activity: Yes     Partners: Female   Lifestyle    Physical activity:     Days per week: Not on file     Minutes per session: Not on file    Stress: Not on file   Relationships    Social connections:     Talks on phone: Not on file     Gets together: Not on file     Attends Tenri[employer] service: Not on file     Active member of club or organization: Not on file     Attends meetings of clubs or organizations: Not on file     Relationship status: Not on file    Intimate partner violence:     Fear of current or ex partner: Not on file     Emotionally abused: Not on file     Physically abused: Not on file     Forced sexual activity: Not on file   Other Topics Concern    Not on file   Social History Narrative    Not on file       Current Outpatient Medications   Medication Sig Dispense Refill    potassium chloride (KLOR-CON M) 10 MEQ extended release tablet Take 10 mEq by mouth daily      ciclopirox (LOPROX) 0.77 % cream Apply topically daily as needed Apply topically 2 times daily.  irbesartan (AVAPRO) 150 MG tablet Take 1 tablet by mouth daily 30 tablet 5    Apoaequorin (PREVAGEN EXTRA STRENGTH) 20 MG CAPS Take by mouth      omeprazole (PRILOSEC) 40 MG delayed release capsule Take 1 capsule by mouth Daily 90 capsule 3    apixaban (ELIQUIS) 5 MG TABS tablet TAKE 1 TABLET TWICE A  tablet 3    furosemide (LASIX) 20 MG tablet TAKE 1 TABLET DAILY (Patient taking differently: 40 mg TAKE 1 TABLET DAILY) 90 tablet 3    metoprolol tartrate (LOPRESSOR) 50 MG tablet TAKE 1 TABLET TWICE A  tablet 3    simvastatin (ZOCOR) 10 MG tablet TAKE 1 TABLET NIGHTLY 90 tablet 3    famciclovir (FAMVIR) 500 MG tablet Take 1 tablet by mouth 3 times daily as needed (breakout) 270 tablet 1    GLUCOSAMINE HCL PO Take by mouth      amoxicillin (AMOXIL) 500 MG capsule Take 500 mg by mouth 3 times daily.  For Dental procedures      aspirin EC 81 MG EC tablet worse  CONCLUSION: This is an abnormal Holter monitor finding with 100% atrial   fibrillation, average heart rate 46 beats per minute ranging from 25 to 89   beats per minute. There is a pause of 3.8 seconds at 5 a.m. in the morning. Probably the patient was asleep at that time. Minimum heart rate of 25 beats   per minute was also at 5 a.m. in the morning. It is abnormal for frequent ventricular ectopic beats. At this time, the patient is completely asymptomatic at least from the diary. RECOMMENDATION: Needs further clinical correlation. Consider evaluation of   this patient. If the patient needs to be on any beta-blocker, any calcium   channel blocker, or any rate reducing medication, the patient may need a   pacemaker. If the patient has any significant symptoms or any symptomatology, suggestive   consideration should be given for pacemaker placement. The patient has significant bradyarrhythmia. The baseline is bradycardic and   also he has a significant cause of 3.8 seconds. However if the patient is   asymptomatic and in view of the background atrial fibrillation, we will   continue with observation and close follow up. [place] M.D.     EKG: Atrial fibrillation-slow ventricular response rate of 36 BPM-Nonspecific QRS widening and anterior fascicular block.    -Anteroseptal infarct -age undetermined. ABNORMAL     Sleep study _moderate LOVE obstructive AHI of 19    EKG:-V paced rhythm     EKH 9/30/14-Electronic ventricular pacemaker underlying afib    EKG afib with V pacing      Small to moderate right-sided pleural effusion is demonstrated along with right basilar consolidative airspace disease which may represent atelectasis or pneumonia.  However, recommend short-term follow-up to document resolution.               This report has been created using voice recognition software.  It may contain minor errors which are inherent in voice recognition technology.       Final report CHF  Cont  Lasix 40  CXR-4/8/19  F/U CXR  Need BMP and MG  Prior to next visit  CXR pa/lat  Weight measurment   Call if wt gain > 5 lb in 1 weeks    Hypertension, on medical treatment. Seems to be under good control. Patient is compliant with medical treatment. HTN need better control  Increase avapro 150 from 75   Pacer interrogation reviewed and d/w the pat  AFIB/ELIQUIS  [employer] DUAL PACEMAKER REMOTE TRANSMISSION  BATTERY 4 YRS REMAINING  ATRIAL IMPEDENCE 626  VENT IMPEDENCE 510  A PACED 0%  V PACED 100%  VVI 60  NO EPISODES       EKG reviewed and d/w the pat     Hyperlipidemia: on statins, followed periodically. Patient need periodic lipid and liver profile.       Cont  Asa 81 mg po QD  atr fib  Cont-apixaban 5 mg po BID    Discussed the risk and benefit of OA and he want to cont with meds    Off  amiodarone for pat intolerance- pat did well and feel much better    Complete lab prior to next visit      RTC in 2 month      [address]

## 2019-04-25 ENCOUNTER — HOSPITAL ENCOUNTER (OUTPATIENT)
Dept: NON INVASIVE DIAGNOSTICS | Age: 79
Discharge: HOME | End: 2019-04-25
Payer: MEDICARE

## 2019-04-25 DIAGNOSIS — I10 ESSENTIAL HYPERTENSION: ICD-10-CM

## 2019-04-25 DIAGNOSIS — I48.20 CHRONIC ATRIAL FIBRILLATION (HCC): ICD-10-CM

## 2019-04-25 DIAGNOSIS — Z95.0 S/P CARDIAC PACEMAKER PROCEDURE: ICD-10-CM

## 2019-04-25 DIAGNOSIS — R06.02 SOB (SHORTNESS OF BREATH) ON EXERTION: ICD-10-CM

## 2019-04-25 DIAGNOSIS — I50.32 DIASTOLIC CHF, CHRONIC (HCC): ICD-10-CM

## 2019-04-25 DIAGNOSIS — I50.31 DIASTOLIC CHF, ACUTE (HCC): ICD-10-CM

## 2019-04-25 DIAGNOSIS — E78.00 PURE HYPERCHOLESTEROLEMIA: ICD-10-CM

## 2019-04-25 PROCEDURE — 78452 HT MUSCLE IMAGE SPECT MULT: CPT | Performed by: INTERNAL MEDICINE

## 2019-04-25 PROCEDURE — 78452 HT MUSCLE IMAGE SPECT MULT: CPT

## 2019-04-25 PROCEDURE — 3430000000 HC RX DIAGNOSTIC RADIOPHARMACEUTICAL: Performed by: INTERNAL MEDICINE

## 2019-04-25 PROCEDURE — A9500 TC99M SESTAMIBI: HCPCS | Performed by: INTERNAL MEDICINE

## 2019-04-25 PROCEDURE — 93017 CV STRESS TEST TRACING ONLY: CPT

## 2019-04-25 PROCEDURE — 6360000002 HC RX W HCPCS

## 2019-04-25 RX ADMIN — Medication 10.1 MILLICURIE: at 09:55

## 2019-04-25 RX ADMIN — Medication 32.4 MILLICURIE: at 10:40

## 2019-04-25 ASSESSMENT — ENCOUNTER SYMPTOMS
SINUS PRESSURE: 0
SHORTNESS OF BREATH: 0
CONSTIPATION: 0

## 2019-04-26 ENCOUNTER — OFFICE VISIT (OUTPATIENT)
Dept: FAMILY MEDICINE CLINIC | Age: 79
End: 2019-04-26

## 2019-04-26 VITALS
HEIGHT: 71 IN | RESPIRATION RATE: 18 BRPM | SYSTOLIC BLOOD PRESSURE: 124 MMHG | DIASTOLIC BLOOD PRESSURE: 68 MMHG | WEIGHT: 224.5 LBS | BODY MASS INDEX: 31.43 KG/M2 | HEART RATE: 64 BPM

## 2019-04-26 DIAGNOSIS — S61.412A SKIN TEAR OF LEFT HAND WITHOUT COMPLICATION, INITIAL ENCOUNTER: ICD-10-CM

## 2019-04-26 DIAGNOSIS — I10 ESSENTIAL HYPERTENSION: Primary | ICD-10-CM

## 2019-04-26 DIAGNOSIS — I71.40 ABDOMINAL AORTIC ANEURYSM (AAA) WITHOUT RUPTURE: ICD-10-CM

## 2019-04-26 DIAGNOSIS — Z12.11 SCREEN FOR COLON CANCER: ICD-10-CM

## 2019-04-26 LAB
HEMOCCULT STL QL: NORMAL

## 2019-04-26 PROCEDURE — 82270 OCCULT BLOOD FECES: CPT | Performed by: FAMILY MEDICINE

## 2019-04-26 PROCEDURE — 99214 OFFICE O/P EST MOD 30 MIN: CPT | Performed by: FAMILY MEDICINE

## 2019-04-26 ASSESSMENT — PATIENT HEALTH QUESTIONNAIRE - PHQ9
SUM OF ALL RESPONSES TO PHQ9 QUESTIONS 1 & 2: 1
SUM OF ALL RESPONSES TO PHQ QUESTIONS 1-9: 1
SUM OF ALL RESPONSES TO PHQ QUESTIONS 1-9: 1
1. LITTLE INTEREST OR PLEASURE IN DOING THINGS: 0
2. FEELING DOWN, DEPRESSED OR HOPELESS: 1

## 2019-04-26 NOTE — PROGRESS NOTES
Subjective:      Patient ID: Sue Randall is a 70 y.o. male. HPI  1. He had CHF in [place]  2. He tripped in [facility] and had a skin tear to the posterior left hand that is healing slowly  Review of Systems   Constitutional: Negative for fatigue. HENT: Negative for sinus pressure. Eyes: Negative for visual disturbance. Respiratory: Negative for shortness of breath. Cardiovascular: Negative for chest pain. Gastrointestinal: Negative for constipation. Genitourinary: Negative. Musculoskeletal: Positive for arthralgias. Skin: Positive for wound. Negative for rash. Neurological: Negative for headaches. Hematological: Bruises/bleeds easily. The patient's medications, allergies, past medical problems, surgical, social, and family histories were reviewed and updated as needed. Objective:   Physical Exam   Constitutional: He is oriented to person, place, and time. He appears well-developed and well-nourished. No distress. HENT:   Head: Normocephalic and atraumatic. Right Ear: External ear normal.   Left Ear: External ear normal.   Nose: Nose normal.   Mouth/Throat: Oropharynx is clear and moist. No oropharyngeal exudate. Eyes: Conjunctivae are normal. No scleral icterus. Neck: Neck supple. Carotid bruit is not present. No tracheal deviation present. No thyromegaly present. Cardiovascular: Normal rate, regular rhythm, normal heart sounds and intact distal pulses. Pulmonary/Chest: Effort normal and breath sounds normal.   Abdominal: Soft. Bowel sounds are normal. He exhibits no mass. Hernia confirmed negative in the right inguinal area and confirmed negative in the left inguinal area. Genitourinary: Rectum normal, prostate normal, testes normal and penis normal. Rectal exam shows guaiac negative stool. Uncircumcised. Musculoskeletal: He exhibits no edema. Lymphadenopathy:     He has no cervical adenopathy. Neurological: He is alert and oriented to person, place, and time.    Skin: Skin is warm and dry. Psychiatric: He has a normal mood and affect. His behavior is normal.   Blood pressure 124/68, pulse 64, resp. rate 18, height 5' 10.5\" (1.791 m), weight 224 lb 8 oz (101.8 kg). Results for orders placed or performed in visit on 04/26/19   POCT occult blood stool   Result Value Ref Range    OCCULT BLOOD FECAL      OCCULT BLOOD FECAL      OCCULT BLOOD FECAL       Negative          Assessment:       Diagnosis Orders   1. Essential hypertension     2. Screen for colon cancer  POCT occult blood stool   3.  Skin tear of left hand without complication, initial encounter             Plan:      See me in 6 months  Stop in next week with the home BP unit to compare with ours        Peter Velazquez MD

## 2019-05-02 ENCOUNTER — NURSE ONLY (OUTPATIENT)
Dept: FAMILY MEDICINE CLINIC | Age: 79
End: 2019-05-02

## 2019-05-02 VITALS — DIASTOLIC BLOOD PRESSURE: 100 MMHG | SYSTOLIC BLOOD PRESSURE: 170 MMHG

## 2019-05-02 DIAGNOSIS — I10 ESSENTIAL HYPERTENSION: Primary | ICD-10-CM

## 2019-05-02 DIAGNOSIS — Z95.0 CARDIAC PACEMAKER: ICD-10-CM

## 2019-05-02 PROCEDURE — 99213 OFFICE O/P EST LOW 20 MIN: CPT | Performed by: EMERGENCY MEDICINE

## 2019-05-02 RX ORDER — IRBESARTAN 150 MG/1
150 TABLET ORAL 2 TIMES DAILY
Qty: 180 TABLET | Refills: 1 | Status: SHIPPED | OUTPATIENT
Start: 2019-05-02 | End: 2019-07-01

## 2019-05-02 ASSESSMENT — ENCOUNTER SYMPTOMS
NAUSEA: 0
TROUBLE SWALLOWING: 0
COUGH: 0
BACK PAIN: 0
VOMITING: 0
WHEEZING: 0
CONSTIPATION: 0
SHORTNESS OF BREATH: 0
SINUS PRESSURE: 0
DIARRHEA: 0
VOICE CHANGE: 0
CHEST TIGHTNESS: 0
ABDOMINAL PAIN: 0
SORE THROAT: 0
RHINORRHEA: 0

## 2019-05-02 NOTE — PROGRESS NOTES
Visit Date: 5/2/2019    Subjective:    Thierno Langston is a 70 y.o.male who presents today for:  Chief Complaint   Patient presents with    Blood Pressure Check         HPI:     HPI     BP at home 160- 180 /80's  , He had history of CHF      CurrentHome Medications:  Current Outpatient Medications   Medication Sig Dispense Refill    irbesartan (AVAPRO) 150 MG tablet Take 1 tablet by mouth 2 times daily 180 tablet 1    apixaban (ELIQUIS) 5 MG TABS tablet TAKE 1 TABLET TWICE A  tablet 3    potassium chloride (KLOR-CON M) 10 MEQ extended release tablet Take 10 mEq by mouth daily      ciclopirox (LOPROX) 0.77 % cream Apply topically daily as needed Apply topically 2 times daily.  Apoaequorin (PREVAGEN EXTRA STRENGTH) 20 MG CAPS Take by mouth      omeprazole (PRILOSEC) 40 MG delayed release capsule Take 1 capsule by mouth Daily 90 capsule 3    furosemide (LASIX) 20 MG tablet TAKE 1 TABLET DAILY (Patient taking differently: 40 mg TAKE 1 TABLET DAILY) 90 tablet 3    metoprolol tartrate (LOPRESSOR) 50 MG tablet TAKE 1 TABLET TWICE A  tablet 3    simvastatin (ZOCOR) 10 MG tablet TAKE 1 TABLET NIGHTLY 90 tablet 3    famciclovir (FAMVIR) 500 MG tablet Take 1 tablet by mouth 3 times daily as needed (breakout) 270 tablet 1    GLUCOSAMINE HCL PO Take by mouth      aspirin EC 81 MG EC tablet Take 1 tablet by mouth daily. 30 tablet 3    econazole nitrate 1 % cream Apply  topically 2 times daily. Apply topically daily.  Calcium-Magnesium-Vitamin D (CITRACAL CALCIUM+D) 600- MG-MG-UNIT TB24 Take 1 tablet by mouth Daily. No current facility-administered medications for this visit. Subjective:      Review of Systems   Constitutional: Negative for appetite change, chills, diaphoresis, fatigue and fever. HENT: Negative for congestion, ear discharge, ear pain, postnasal drip, rhinorrhea, sinus pressure, sore throat, trouble swallowing and voice change.     Respiratory: Negative for cough, chest tightness, shortness of breath and wheezing. Cardiovascular: Negative for chest pain, palpitations and leg swelling. Gastrointestinal: Negative for abdominal pain, constipation, diarrhea, nausea and vomiting. Musculoskeletal: Negative for arthralgias, back pain, joint swelling, myalgias, neck pain and neck stiffness. Skin: Negative for rash. Neurological: Negative for dizziness, syncope, weakness, light-headedness, numbness and headaches. Objective:     BP (!) 170/100 (Site: Left Upper Arm, Position: Sitting, Cuff Size: Large Adult)   BP Readings from Last 3 Encounters:   05/02/19 (!) 170/100   04/26/19 124/68   04/12/19 (!) 156/94     Wt Readings from Last 3 Encounters:   04/26/19 224 lb 8 oz (101.8 kg)   04/12/19 213 lb (96.6 kg)   10/25/18 222 lb 2 oz (100.8 kg)       Physical Exam   Constitutional: He is oriented to person, place, and time. He appears well-developed and well-nourished. He is cooperative. HENT:   Head: Normocephalic and atraumatic. Right Ear: External ear normal.   Left Ear: External ear normal.   Nose: Nose normal.   Mouth/Throat: Oropharynx is clear and moist.   Eyes: Pupils are equal, round, and reactive to light. Conjunctivae and EOM are normal. No scleral icterus. Neck: Normal range of motion. Neck supple. No JVD present. No thyromegaly present. Cardiovascular: Normal rate, regular rhythm and intact distal pulses. Exam reveals no friction rub. No murmur heard. Pulmonary/Chest: Effort normal and breath sounds normal. He has no wheezes. He has no rales. He exhibits no tenderness. Abdominal: Soft. Bowel sounds are normal. He exhibits no mass. There is no tenderness. Musculoskeletal: He exhibits no edema. Lymphadenopathy:     He has no cervical adenopathy. Neurological: He is alert and oriented to person, place, and time. Skin: No rash noted. Vitals reviewed. Assessment:         Diagnosis Orders   1. Essential hypertension     2.  Cardiac pacemaker         Plan:      Medications Prescribed:  Orders Placed This Encounter   Medications    irbesartan (AVAPRO) 150 MG tablet     Sig: Take 1 tablet by mouth 2 times daily     Dispense:  180 tablet     Refill:  1     Orders Placed:  No orders of the defined types were placed in this encounter. Results of Laboratory tests taken 3/22/19/ were reviewed with the patient. Results were w/in  acceptable range     Return in about 6 weeks (around 6/13/2019) for Dr Angelica Berman HTN. Discussed use, benefit, and side effects of prescribedmedications. All patient questions answered. Pt voiced understanding. Instructedto continue current medications, diet and exercise. Patient agreed with treatmentplan.

## 2019-05-08 ENCOUNTER — PROCEDURE VISIT (OUTPATIENT)
Dept: CARDIOLOGY CLINIC | Age: 79
End: 2019-05-08
Payer: MEDICARE

## 2019-05-08 DIAGNOSIS — Z95.0 S/P CARDIAC PACEMAKER PROCEDURE: Primary | ICD-10-CM

## 2019-05-08 PROCEDURE — 93296 REM INTERROG EVL PM/IDS: CPT | Performed by: INTERNAL MEDICINE

## 2019-05-08 PROCEDURE — 93294 REM INTERROG EVL PM/LDLS PM: CPT | Performed by: INTERNAL MEDICINE

## 2019-05-08 NOTE — PROGRESS NOTES
3.5 years on device   At imped 645   Programmed VVIR   0% atrial paced 100% RVP  R waves not measured

## 2019-05-28 RX ORDER — SIMVASTATIN 10 MG
TABLET ORAL
Qty: 90 TABLET | Refills: 3 | Status: SHIPPED | OUTPATIENT
Start: 2019-05-28 | End: 2020-05-22

## 2019-05-28 RX ORDER — METOPROLOL TARTRATE 50 MG/1
TABLET, FILM COATED ORAL
Qty: 180 TABLET | Refills: 3 | Status: SHIPPED | OUTPATIENT
Start: 2019-05-28 | End: 2019-06-04 | Stop reason: ALTCHOICE

## 2019-06-04 ENCOUNTER — HOSPITAL ENCOUNTER (OUTPATIENT)
Age: 79
Discharge: HOME | End: 2019-06-04
Payer: MEDICARE

## 2019-06-04 ENCOUNTER — HOSPITAL ENCOUNTER (OUTPATIENT)
Dept: GENERAL RADIOLOGY | Age: 79
Discharge: HOME | End: 2019-06-04
Payer: MEDICARE

## 2019-06-04 ENCOUNTER — OFFICE VISIT (OUTPATIENT)
Dept: CARDIOLOGY CLINIC | Age: 79
End: 2019-06-04
Payer: MEDICARE

## 2019-06-04 VITALS
HEART RATE: 60 BPM | BODY MASS INDEX: 31.36 KG/M2 | SYSTOLIC BLOOD PRESSURE: 165 MMHG | WEIGHT: 224 LBS | DIASTOLIC BLOOD PRESSURE: 84 MMHG | HEIGHT: 71 IN

## 2019-06-04 DIAGNOSIS — E78.00 PURE HYPERCHOLESTEROLEMIA: ICD-10-CM

## 2019-06-04 DIAGNOSIS — I50.31 DIASTOLIC CHF, ACUTE (HCC): ICD-10-CM

## 2019-06-04 DIAGNOSIS — I48.20 CHRONIC ATRIAL FIBRILLATION (HCC): ICD-10-CM

## 2019-06-04 DIAGNOSIS — R06.02 SOB (SHORTNESS OF BREATH) ON EXERTION: ICD-10-CM

## 2019-06-04 DIAGNOSIS — Z95.0 S/P CARDIAC PACEMAKER PROCEDURE: ICD-10-CM

## 2019-06-04 DIAGNOSIS — I50.32 DIASTOLIC CHF, CHRONIC (HCC): ICD-10-CM

## 2019-06-04 DIAGNOSIS — I48.0 PAROXYSMAL ATRIAL FIBRILLATION (HCC): Primary | ICD-10-CM

## 2019-06-04 DIAGNOSIS — I10 ESSENTIAL HYPERTENSION: ICD-10-CM

## 2019-06-04 PROCEDURE — 93000 ELECTROCARDIOGRAM COMPLETE: CPT | Performed by: PHYSICIAN ASSISTANT

## 2019-06-04 PROCEDURE — 99213 OFFICE O/P EST LOW 20 MIN: CPT | Performed by: PHYSICIAN ASSISTANT

## 2019-06-04 PROCEDURE — 71046 X-RAY EXAM CHEST 2 VIEWS: CPT

## 2019-06-04 RX ORDER — METOPROLOL TARTRATE 50 MG/1
75 TABLET, FILM COATED ORAL 2 TIMES DAILY
Qty: 90 TABLET | Refills: 3 | Status: SHIPPED | OUTPATIENT
Start: 2019-06-04 | End: 2019-12-30

## 2019-06-04 RX ORDER — AMOXICILLIN 500 MG/1
500 CAPSULE ORAL 3 TIMES DAILY
COMMUNITY
End: 2022-10-18

## 2019-06-04 NOTE — PROGRESS NOTES
[facility] PROFESSIONAL SERVICES  [facility]   [address] [place] [address]   [address] [address]   Dept: [phone]   Dept Fax: [phone]   Loc: [phone]      Chief Complaint   Patient presents with   Darellis Olden    Congestive Heart Failure     Patient with a history of diastolic congestive heart failure presents for follow-up. Patient states his blood pressure has remained elevated at times. His Avapro has been increased to 150 twice a day. He has 100% pacer dependent. He has some intermittent shortness of breath. He denies any chest pain or palpitations. Cardiologist:  Dr. Rakan Soliz:   No fever, no chills, No fatigue or weight loss  Pulmonary:    Intermittent shortness of breath  Cardiac:    Denies recent chest pain   GI:     No nausea or vomiting, no abdominal pain  Neuro:    No dizziness or light headedness  Musculoskeletal:  No recent active issues  Extremities:   No edema, good peripheral pulses      Past Medical History:   Diagnosis Date    AAA (abdominal aortic aneurysm) ([facility] [address] 05/2018    Adenomatous colon polyp 2012    Alzheimer disease     early stage    Antral gastritis 7/2012    Arthritis     Atrial fibrillation ([facility] [address] 2013    AV block, Mobitz 1 10/31/2013    Blood transfusion reaction     Cancer ([facility] [address] 2010    SKIN CANCER NECK    CHF (congestive heart failure) ([facility] [address]     GERD with stricture 7/2012    Hyperlipidemia 2008    Hypertension 2005    Nausea & vomiting     LOVE on CPAP     Rheumatic fever 1950's    S/P cardiac pacemaker procedure: 10/31/2013: [employer] [employer] Dual chamber. 10/31/2013    10/31/2013: [employer]. Dual chamber.  Dr. Faith Ortega Umbilical hernia [id number]    Vitamin B1 deficiency 4/2016       Allergies   Allergen Reactions    Amiodarone      DEPRESSION, CONSTIPATION    Lamisil [Terbinafine] Itching    Morphine Other (See Comments)     hallucinations    Nickel Hives    Adhesive Tape Rash       Current Outpatient Medications   Medication Sig Dispense Refill    amoxicillin (AMOXIL) 500 MG capsule Take 500 mg by mouth 3 times daily      metoprolol tartrate (LOPRESSOR) 50 MG tablet Take 1.5 tablets by mouth 2 times daily 90 tablet 3    simvastatin (ZOCOR) 10 MG tablet TAKE 1 TABLET NIGHTLY 90 tablet 3    irbesartan (AVAPRO) 150 MG tablet Take 1 tablet by mouth 2 times daily 180 tablet 1    apixaban (ELIQUIS) 5 MG TABS tablet TAKE 1 TABLET TWICE A  tablet 3    potassium chloride (KLOR-CON M) 10 MEQ extended release tablet Take 10 mEq by mouth daily      ciclopirox (LOPROX) 0.77 % cream Apply topically daily as needed Apply topically 2 times daily.  Apoaequorin (PREVAGEN EXTRA STRENGTH) 20 MG CAPS Take by mouth      omeprazole (PRILOSEC) 40 MG delayed release capsule Take 1 capsule by mouth Daily 90 capsule 3    furosemide (LASIX) 20 MG tablet TAKE 1 TABLET DAILY (Patient taking differently: 40 mg TAKE 1 TABLET DAILY) 90 tablet 3    GLUCOSAMINE HCL PO Take by mouth      aspirin EC 81 MG EC tablet Take 1 tablet by mouth daily. 30 tablet 3    econazole nitrate 1 % cream Apply  topically 2 times daily. Apply topically daily.  Calcium-Magnesium-Vitamin D (CITRACAL CALCIUM+D) 600- MG-MG-UNIT TB24 Take 1 tablet by mouth Daily. No current facility-administered medications for this visit.         Social History     Socioeconomic History    Marital status:      Spouse name: None    Number of children: None    Years of education: None    Highest education level: None   Occupational History    Occupation: retired   Social Needs    Financial resource strain: None    Food insecurity:     Worry: None     Inability: None    Transportation needs:     Medical: None     Non-medical: None   Tobacco Use    Smoking status: Former Smoker     Packs/day: 1.00     Years: 20.00     Pack years: 20.00     Types: Cigarettes     Last attempt to quit: 1986     Years since quittin.4    Smokeless contractions. Couplets also noted on the study. 5. This is an abnormal Holter. Clinical correlation and follow up if   clinically indicated suggested. 6. One may want to consider serial follow up on this patient because   of the possible sick sinus syndrome features of the Holter monitor.         Holter after CPAP repeated - NO changes actually had 3.8 sec pause- got worse  CONCLUSION: This is an abnormal Holter monitor finding with 100% atrial   fibrillation, average heart rate 46 beats per minute ranging from 25 to 89   beats per minute. There is a pause of 3.8 seconds at 5 a.m. in the morning. Probably the patient was asleep at that time. Minimum heart rate of 25 beats   per minute was also at 5 a.m. in the morning. It is abnormal for frequent ventricular ectopic beats. At this time, the patient is completely asymptomatic at least from the diary. RECOMMENDATION: Needs further clinical correlation. Consider evaluation of   this patient. If the patient needs to be on any beta-blocker, any calcium   channel blocker, or any rate reducing medication, the patient may need a   pacemaker. If the patient has any significant symptoms or any symptomatology, suggestive   consideration should be given for pacemaker placement. The patient has significant bradyarrhythmia. The baseline is bradycardic and   also he has a significant cause of 3.8 seconds. However if the patient is   asymptomatic and in view of the background atrial fibrillation, we will   continue with observation and close follow up. [place] M.D.      EKG: Atrial fibrillation-slow ventricular response rate of 36 BPM-Nonspecific QRS widening and anterior fascicular block.    -Anteroseptal infarct -age undetermined.  ABNORMAL      Sleep study _moderate LOVE obstructive AHI of 19     EKG:-V paced rhythm      EKH 9/30/14-Electronic ventricular pacemaker underlying afib     EKG afib with V pacing    Stress test

## 2019-06-10 NOTE — TELEPHONE ENCOUNTER
Pt  Wife Goldie Laser stating pt bp is still high. This is your last note. Bp have been 170/100 164/98 pt feels like he is feeling up with fluid also. Recommendations?        Continue to monitor blood pressure closely.     We may need to add a calcium channel blocker

## 2019-06-11 RX ORDER — AMLODIPINE BESYLATE 5 MG/1
5 TABLET ORAL DAILY
Qty: 30 TABLET | Refills: 1 | Status: SHIPPED | OUTPATIENT
Start: 2019-06-11 | End: 2019-07-01

## 2019-06-11 RX ORDER — AMLODIPINE BESYLATE 5 MG/1
5 TABLET ORAL DAILY
COMMUNITY
End: 2019-06-11 | Stop reason: SDUPTHER

## 2019-06-17 DIAGNOSIS — K29.50 ANTRAL GASTRITIS: ICD-10-CM

## 2019-06-17 RX ORDER — OMEPRAZOLE 40 MG/1
CAPSULE, DELAYED RELEASE ORAL
Qty: 90 CAPSULE | Refills: 3 | Status: SHIPPED | OUTPATIENT
Start: 2019-06-17 | End: 2020-06-08 | Stop reason: ALTCHOICE

## 2019-06-17 NOTE — TELEPHONE ENCOUNTER
Date of last visit:  4/26/2019  Date of next visit:  10/18/2019    Requested Prescriptions     Pending Prescriptions Disp Refills    omeprazole (PRILOSEC) 40 MG delayed release capsule [Pharmacy Med Name: OMEPRAZOLE DR MALLY 40MG] 90 capsule 3     Sig: TAKE 1 CAPSULE DAILY

## 2019-06-26 LAB
ANION GAP SERPL CALCULATED.3IONS-SCNC: 9 MMOL/L (ref 4–12)
BUN BLDV-MCNC: 18 MG/DL (ref 5–27)
CALCIUM SERPL-MCNC: 9.7 MG/DL (ref 8.5–10.5)
CHLORIDE BLD-SCNC: 107 MMOL/L (ref 98–109)
CO2: 30 MMOL/L (ref 22–32)
CREAT SERPL-MCNC: 1 MG/DL (ref 0.6–1.3)
EGFR AFRICAN AMERICAN: >60 ML/MIN/1.73SQ.M
EGFR IF NONAFRICAN AMERICAN: >60 ML/MIN/1.73SQ.M
GLUCOSE: 97 MG/DL (ref 65–99)
MAGNESIUM: 2.1 MG/DL (ref 1.8–2.6)
POTASSIUM SERPL-SCNC: 4.7 MMOL/L (ref 3.5–5)
SODIUM BLD-SCNC: 146 MMOL/L (ref 134–146)

## 2019-07-01 ENCOUNTER — OFFICE VISIT (OUTPATIENT)
Dept: CARDIOLOGY CLINIC | Age: 79
End: 2019-07-01
Payer: MEDICARE

## 2019-07-01 VITALS
HEIGHT: 70 IN | WEIGHT: 218 LBS | DIASTOLIC BLOOD PRESSURE: 88 MMHG | HEART RATE: 62 BPM | SYSTOLIC BLOOD PRESSURE: 155 MMHG | BODY MASS INDEX: 31.21 KG/M2

## 2019-07-01 DIAGNOSIS — I48.0 PAROXYSMAL ATRIAL FIBRILLATION (HCC): ICD-10-CM

## 2019-07-01 DIAGNOSIS — I10 ESSENTIAL HYPERTENSION: ICD-10-CM

## 2019-07-01 DIAGNOSIS — E78.00 PURE HYPERCHOLESTEROLEMIA: ICD-10-CM

## 2019-07-01 DIAGNOSIS — I50.31 DIASTOLIC CHF, ACUTE (HCC): Primary | ICD-10-CM

## 2019-07-01 DIAGNOSIS — I71.40 ABDOMINAL AORTIC ANEURYSM (AAA) WITHOUT RUPTURE: ICD-10-CM

## 2019-07-01 DIAGNOSIS — Z95.0 S/P CARDIAC PACEMAKER PROCEDURE: ICD-10-CM

## 2019-07-01 DIAGNOSIS — I45.89 CHRONOTROPIC INCOMPETENCE: ICD-10-CM

## 2019-07-01 PROCEDURE — 99214 OFFICE O/P EST MOD 30 MIN: CPT | Performed by: INTERNAL MEDICINE

## 2019-07-01 RX ORDER — AMLODIPINE BESYLATE 10 MG/1
10 TABLET ORAL DAILY
Qty: 30 TABLET | Refills: 3 | Status: SHIPPED | OUTPATIENT
Start: 2019-07-01 | End: 2019-07-01 | Stop reason: SDUPTHER

## 2019-07-01 RX ORDER — IRBESARTAN 300 MG/1
300 TABLET ORAL DAILY
Qty: 30 TABLET | Refills: 3 | Status: SHIPPED | OUTPATIENT
Start: 2019-07-01 | End: 2019-07-01 | Stop reason: SDUPTHER

## 2019-07-01 NOTE — PROGRESS NOTES
ventricular response. That has been   Resolved. nUC STRESS TEST NET jUNE 2013      Plan     The most current meds and labs reviewed    Hx of Hyperkalemia- resolved   cont kcl 10 po qd  Advised to cut down tomato, orange and bannana      Hypertension, on medical treatment. Seems to be under good control. Patient is compliant with medical treatment. SOB ON EXERTION   Recent worsening CHF  NEED ISCHEMIA WORK UP  DECLINED echo and STRESS TEST AND WANT TO CONT   INFORMED TO COME OR CALL IF CHANGE HIS MIND    Congestive heart failure: no evidence of fluid overload today, no recent hospitalization for CHF  Cont  Lasix 40  CXR-4/8/19  F/U CXR  Need BMP and MG  Prior to next visit  CXR pa/lat  Weight measurment   Call if wt gain > 5 lb in 1 weeks    Hypertension, on medical treatment. Seems to be under good control. Patient is compliant with medical treatment. HTN need better control  Cont  avapro  300 mg po qd  Increase Norvasc 10 po qd  Pacer interrogation reviewed and d/w the pat  AFIB/ELIQUIS  [employer] DUAL PACEMAKER REMOTE TRANSMISSION  BATTERY 4 YRS REMAINING  ATRIAL IMPEDENCE 626  VENT IMPEDENCE 510  A PACED 0%  V PACED 100%  VVI 60  NO EPISODES     Home /94      EKG reviewed and d/w the pat     Hyperlipidemia: on statins, followed periodically. Patient need periodic lipid and liver profile.       Cont  Asa 81 mg po QD  atr fib  Cont-apixaban 5 mg po BID    Discussed the risk and benefit of OA and he want to cont with meds    Off  amiodarone for pat intolerance- pat did well and feel much better    Complete lab prior to next visit    CXR RT Pleural Effusion unchanged      RTC in 3 month      Jeevan Preciado [facility]

## 2019-07-02 RX ORDER — IRBESARTAN 300 MG/1
300 TABLET ORAL DAILY
Qty: 90 TABLET | Refills: 3 | Status: SHIPPED | OUTPATIENT
Start: 2019-07-02 | End: 2019-10-08 | Stop reason: SDUPTHER

## 2019-07-02 RX ORDER — AMLODIPINE BESYLATE 10 MG/1
10 TABLET ORAL DAILY
Qty: 90 TABLET | Refills: 3 | Status: SHIPPED | OUTPATIENT
Start: 2019-07-02 | End: 2020-07-15

## 2019-08-13 ENCOUNTER — PROCEDURE VISIT (OUTPATIENT)
Dept: CARDIOLOGY CLINIC | Age: 79
End: 2019-08-13
Payer: MEDICARE

## 2019-08-13 DIAGNOSIS — Z95.0 S/P CARDIAC PACEMAKER PROCEDURE: Primary | ICD-10-CM

## 2019-08-13 PROCEDURE — 93294 REM INTERROG EVL PM/LDLS PM: CPT | Performed by: INTERNAL MEDICINE

## 2019-08-13 PROCEDURE — 93296 REM INTERROG EVL PM/IDS: CPT | Performed by: INTERNAL MEDICINE

## 2019-08-13 NOTE — PROGRESS NOTES
[address] dual pacemaker  Battery 3.5years  A paced 0%  V paced 100%  p wave not measured  Atrial impedance 634 ohms  Ventricle impedance 501 impedance    r wave not measured  A fib alert has been turned off in past

## 2019-09-25 ENCOUNTER — OFFICE VISIT (OUTPATIENT)
Dept: FAMILY MEDICINE CLINIC | Age: 79
End: 2019-09-25

## 2019-09-25 VITALS
BODY MASS INDEX: 31.66 KG/M2 | HEART RATE: 64 BPM | WEIGHT: 221.13 LBS | DIASTOLIC BLOOD PRESSURE: 72 MMHG | HEIGHT: 70 IN | RESPIRATION RATE: 16 BRPM | SYSTOLIC BLOOD PRESSURE: 122 MMHG

## 2019-09-25 DIAGNOSIS — K59.00 CONSTIPATION, UNSPECIFIED CONSTIPATION TYPE: Primary | ICD-10-CM

## 2019-09-25 PROCEDURE — G0008 ADMIN INFLUENZA VIRUS VAC: HCPCS | Performed by: FAMILY MEDICINE

## 2019-09-25 PROCEDURE — 90688 IIV4 VACCINE SPLT 0.5 ML IM: CPT | Performed by: FAMILY MEDICINE

## 2019-09-25 PROCEDURE — 99213 OFFICE O/P EST LOW 20 MIN: CPT | Performed by: FAMILY MEDICINE

## 2019-09-25 ASSESSMENT — ENCOUNTER SYMPTOMS
SHORTNESS OF BREATH: 0
CONSTIPATION: 1
SINUS PRESSURE: 0

## 2019-09-30 ENCOUNTER — OFFICE VISIT (OUTPATIENT)
Dept: CARDIOLOGY CLINIC | Age: 79
End: 2019-09-30
Payer: MEDICARE

## 2019-09-30 VITALS
BODY MASS INDEX: 30.49 KG/M2 | HEIGHT: 70 IN | DIASTOLIC BLOOD PRESSURE: 83 MMHG | SYSTOLIC BLOOD PRESSURE: 139 MMHG | WEIGHT: 213 LBS | HEART RATE: 60 BPM

## 2019-09-30 DIAGNOSIS — I48.20 CHRONIC ATRIAL FIBRILLATION (HCC): ICD-10-CM

## 2019-09-30 DIAGNOSIS — I50.31 DIASTOLIC CHF, ACUTE (HCC): Primary | ICD-10-CM

## 2019-09-30 DIAGNOSIS — Z95.0 S/P CARDIAC PACEMAKER PROCEDURE: ICD-10-CM

## 2019-09-30 DIAGNOSIS — I71.40 ABDOMINAL AORTIC ANEURYSM (AAA) WITHOUT RUPTURE: ICD-10-CM

## 2019-09-30 DIAGNOSIS — E78.00 PURE HYPERCHOLESTEROLEMIA: ICD-10-CM

## 2019-09-30 DIAGNOSIS — R06.02 SOB (SHORTNESS OF BREATH) ON EXERTION: ICD-10-CM

## 2019-09-30 PROCEDURE — 99214 OFFICE O/P EST MOD 30 MIN: CPT | Performed by: INTERNAL MEDICINE

## 2019-09-30 PROCEDURE — 93000 ELECTROCARDIOGRAM COMPLETE: CPT | Performed by: INTERNAL MEDICINE

## 2019-09-30 NOTE — PROGRESS NOTES
Sexual Activity    Alcohol use: Yes     Alcohol/week: 0.0 standard drinks     Comment: USALLY 1 DRINK DAILY    Drug use: No    Sexual activity: Yes     Partners: Female   Lifestyle    Physical activity:     Days per week: Not on file     Minutes per session: Not on file    Stress: Not on file   Relationships    Social connections:     Talks on phone: Not on file     Gets together: Not on file     Attends Restorationism service: Not on file     Active member of club or organization: Not on file     Attends meetings of clubs or organizations: Not on file     Relationship status: Not on file    Intimate partner violence:     Fear of current or ex partner: Not on file     Emotionally abused: Not on file     Physically abused: Not on file     Forced sexual activity: Not on file   Other Topics Concern    Not on file   Social History Narrative    Not on file       Current Outpatient Medications   Medication Sig Dispense Refill    irbesartan (AVAPRO) 300 MG tablet Take 1 tablet by mouth daily 90 tablet 3    amLODIPine (NORVASC) 10 MG tablet Take 1 tablet by mouth daily 90 tablet 3    omeprazole (PRILOSEC) 40 MG delayed release capsule TAKE 1 CAPSULE DAILY 90 capsule 3    amoxicillin (AMOXIL) 500 MG capsule Take 500 mg by mouth 3 times daily      metoprolol tartrate (LOPRESSOR) 50 MG tablet Take 1.5 tablets by mouth 2 times daily 90 tablet 3    simvastatin (ZOCOR) 10 MG tablet TAKE 1 TABLET NIGHTLY 90 tablet 3    apixaban (ELIQUIS) 5 MG TABS tablet TAKE 1 TABLET TWICE A  tablet 3    potassium chloride (KLOR-CON M) 10 MEQ extended release tablet Take 10 mEq by mouth daily      ciclopirox (LOPROX) 0.77 % cream Apply topically daily as needed Apply topically 2 times daily.       Apoaequorin (PREVAGEN EXTRA STRENGTH) 20 MG CAPS Take by mouth      furosemide (LASIX) 20 MG tablet TAKE 1 TABLET DAILY (Patient taking differently: 40 mg TAKE 1 TABLET DAILY) 90 tablet 3    GLUCOSAMINE HCL PO Take by mouth      technology.       Final report electronically signed by Dr. Ursula Mcmillan on 4/8/2019 11:52 AM         Persistence of a small to moderate right-sided pleural effusion with right basilar opacities which may represent atelectasis or infiltrate. Oral stable appearance of the chest when compared to the previous exam.           This report has been created using voice recognition software. It may contain minor errors which are inherent in voice recognition technology.       Final report electronically signed by Dr Marilu Darling on 6/4/2019 1:05 PM       EKG 9/30/19    Electronic ventricular pacemaker   Pacemaker ECG, No further analysis   INSUFFICIENT DATA        Assessment  B/L PPP edema trace to +1 CONT LASIX     Diagnosis Orders   1. Diastolic CHF, chronic  EKG 12 Lead   2. Chronic atrial fibrillation     3. Pure hypercholesterolemia     4. Abdominal aortic aneurysm (AAA) without rupture (HCC)  EKG 12 Lead   5. SOB (shortness of breath) on exertion     6. S/P cardiac pacemaker procedure: 10/31/2013: [employer] Dual chamber. Previous admission DX  ASSESSMENT:   1. Acute diastolic congestive heart failure. The patient had   significant shortness of breath and basically having chronic   shortness of breath was not addressed and basically we will treat   him for that. 2. Status post pacemaker placement for sick sinus syndrome with slow   ventricular response, pause of 3.8 and complete heart block noted   in the cath lab while performing the procedure on A-pacing and no   V-capturing. There is no conduction. No capturing. On A-pacing,   there is no conduction to ventricle. 3. Status post pacemaker placement, [employer] and pacemaker   has been interrogated. Interrogation was functionally normal.   4. Hypertension. 5. Hyperlipidemia. 6. Obstructive sleep apnea on C-PAP. 7. Obesity. 8. History of erectile dysfunction.    9. History of dizziness, generalized body weakness, easy

## 2019-10-08 ENCOUNTER — TELEPHONE (OUTPATIENT)
Dept: CARDIOLOGY CLINIC | Age: 79
End: 2019-10-08

## 2019-10-08 RX ORDER — IRBESARTAN 300 MG/1
300 TABLET ORAL DAILY
Qty: 90 TABLET | Refills: 3 | Status: SHIPPED | OUTPATIENT
Start: 2019-10-08 | End: 2020-09-30

## 2019-10-16 ENCOUNTER — HOSPITAL ENCOUNTER (OUTPATIENT)
Dept: ULTRASOUND IMAGING | Age: 79
Discharge: HOME | End: 2019-10-16
Payer: MEDICARE

## 2019-10-16 ENCOUNTER — OFFICE VISIT (OUTPATIENT)
Dept: PULMONOLOGY | Age: 79
End: 2019-10-16
Payer: MEDICARE

## 2019-10-16 VITALS
HEART RATE: 80 BPM | SYSTOLIC BLOOD PRESSURE: 124 MMHG | DIASTOLIC BLOOD PRESSURE: 78 MMHG | TEMPERATURE: 97.6 F | HEIGHT: 70 IN | WEIGHT: 225.8 LBS | OXYGEN SATURATION: 98 % | BODY MASS INDEX: 32.33 KG/M2

## 2019-10-16 DIAGNOSIS — D36.9 TUBULAR ADENOMA: ICD-10-CM

## 2019-10-16 DIAGNOSIS — E66.9 OBESITY, CLASS I, BMI 30-34.9: ICD-10-CM

## 2019-10-16 DIAGNOSIS — R10.9 RIGHT SIDED ABDOMINAL PAIN: ICD-10-CM

## 2019-10-16 DIAGNOSIS — G47.33 OBSTRUCTIVE SLEEP APNEA ON CPAP: Primary | ICD-10-CM

## 2019-10-16 DIAGNOSIS — Z99.89 OBSTRUCTIVE SLEEP APNEA ON CPAP: Primary | ICD-10-CM

## 2019-10-16 PROCEDURE — 76705 ECHO EXAM OF ABDOMEN: CPT

## 2019-10-16 PROCEDURE — 99213 OFFICE O/P EST LOW 20 MIN: CPT | Performed by: PHYSICIAN ASSISTANT

## 2019-10-16 ASSESSMENT — ENCOUNTER SYMPTOMS
WHEEZING: 0
ALLERGIC/IMMUNOLOGIC NEGATIVE: 1
STRIDOR: 0
EYES NEGATIVE: 1
NAUSEA: 0
COUGH: 0
CHEST TIGHTNESS: 0
SHORTNESS OF BREATH: 0
BACK PAIN: 0
DIARRHEA: 0

## 2019-10-17 ENCOUNTER — NURSE ONLY (OUTPATIENT)
Dept: CARDIOLOGY CLINIC | Age: 79
End: 2019-10-17
Payer: MEDICARE

## 2019-10-17 DIAGNOSIS — Z95.0 S/P CARDIAC PACEMAKER PROCEDURE: Primary | ICD-10-CM

## 2019-10-17 PROCEDURE — 93279 PRGRMG DEV EVAL PM/LDLS PM: CPT | Performed by: INTERNAL MEDICINE

## 2019-10-22 ENCOUNTER — HOSPITAL ENCOUNTER (OUTPATIENT)
Age: 79
Setting detail: OUTPATIENT SURGERY
Discharge: HOME | End: 2019-10-22
Attending: INTERNAL MEDICINE | Admitting: INTERNAL MEDICINE
Payer: MEDICARE

## 2019-10-22 ENCOUNTER — ANESTHESIA EVENT (OUTPATIENT)
Dept: ENDOSCOPY | Age: 79
End: 2019-10-22
Payer: MEDICARE

## 2019-10-22 ENCOUNTER — ANESTHESIA (OUTPATIENT)
Dept: ENDOSCOPY | Age: 79
End: 2019-10-22
Payer: MEDICARE

## 2019-10-22 VITALS
HEART RATE: 62 BPM | SYSTOLIC BLOOD PRESSURE: 141 MMHG | HEIGHT: 70 IN | OXYGEN SATURATION: 96 % | RESPIRATION RATE: 20 BRPM | DIASTOLIC BLOOD PRESSURE: 75 MMHG | BODY MASS INDEX: 31.07 KG/M2 | WEIGHT: 217 LBS | TEMPERATURE: 97 F

## 2019-10-22 VITALS
RESPIRATION RATE: 8 BRPM | SYSTOLIC BLOOD PRESSURE: 132 MMHG | OXYGEN SATURATION: 100 % | DIASTOLIC BLOOD PRESSURE: 85 MMHG

## 2019-10-22 PROCEDURE — 7100000000 HC PACU RECOVERY - FIRST 15 MIN: Performed by: INTERNAL MEDICINE

## 2019-10-22 PROCEDURE — 6360000002 HC RX W HCPCS: Performed by: ANESTHESIOLOGY

## 2019-10-22 PROCEDURE — 2709999900 HC NON-CHARGEABLE SUPPLY: Performed by: INTERNAL MEDICINE

## 2019-10-22 PROCEDURE — 3700000000 HC ANESTHESIA ATTENDED CARE: Performed by: INTERNAL MEDICINE

## 2019-10-22 PROCEDURE — 3700000001 HC ADD 15 MINUTES (ANESTHESIA): Performed by: INTERNAL MEDICINE

## 2019-10-22 PROCEDURE — 3609017100 HC EGD: Performed by: INTERNAL MEDICINE

## 2019-10-22 PROCEDURE — 3609027000 HC COLONOSCOPY: Performed by: INTERNAL MEDICINE

## 2019-10-22 PROCEDURE — 7100000001 HC PACU RECOVERY - ADDTL 15 MIN: Performed by: INTERNAL MEDICINE

## 2019-10-22 PROCEDURE — 2580000003 HC RX 258: Performed by: INTERNAL MEDICINE

## 2019-10-22 RX ORDER — SODIUM CHLORIDE 450 MG/100ML
INJECTION, SOLUTION INTRAVENOUS CONTINUOUS
Status: DISCONTINUED | OUTPATIENT
Start: 2019-10-22 | End: 2019-10-22 | Stop reason: HOSPADM

## 2019-10-22 RX ORDER — PROPOFOL 10 MG/ML
INJECTION, EMULSION INTRAVENOUS PRN
Status: DISCONTINUED | OUTPATIENT
Start: 2019-10-22 | End: 2019-10-22 | Stop reason: SDUPTHER

## 2019-10-22 RX ADMIN — PROPOFOL 20 MG: 10 INJECTION, EMULSION INTRAVENOUS at 11:07

## 2019-10-22 RX ADMIN — SODIUM CHLORIDE: 4.5 INJECTION, SOLUTION INTRAVENOUS at 09:16

## 2019-10-22 RX ADMIN — PROPOFOL 20 MG: 10 INJECTION, EMULSION INTRAVENOUS at 11:09

## 2019-10-22 RX ADMIN — PROPOFOL 30 MG: 10 INJECTION, EMULSION INTRAVENOUS at 10:58

## 2019-10-22 RX ADMIN — PROPOFOL 30 MG: 10 INJECTION, EMULSION INTRAVENOUS at 10:56

## 2019-10-22 RX ADMIN — PROPOFOL 10 MG: 10 INJECTION, EMULSION INTRAVENOUS at 11:20

## 2019-10-22 RX ADMIN — PROPOFOL 40 MG: 10 INJECTION, EMULSION INTRAVENOUS at 10:53

## 2019-10-22 RX ADMIN — PROPOFOL 20 MG: 10 INJECTION, EMULSION INTRAVENOUS at 10:59

## 2019-10-22 RX ADMIN — PROPOFOL 30 MG: 10 INJECTION, EMULSION INTRAVENOUS at 10:54

## 2019-10-22 RX ADMIN — PROPOFOL 50 MG: 10 INJECTION, EMULSION INTRAVENOUS at 11:00

## 2019-10-22 ASSESSMENT — PAIN - FUNCTIONAL ASSESSMENT: PAIN_FUNCTIONAL_ASSESSMENT: 0-10

## 2019-10-29 ENCOUNTER — TELEPHONE (OUTPATIENT)
Dept: CARDIOLOGY CLINIC | Age: 79
End: 2019-10-29

## 2019-11-20 ENCOUNTER — PROCEDURE VISIT (OUTPATIENT)
Dept: CARDIOLOGY CLINIC | Age: 79
End: 2019-11-20
Payer: MEDICARE

## 2019-11-20 DIAGNOSIS — Z95.0 S/P CARDIAC PACEMAKER PROCEDURE: Primary | ICD-10-CM

## 2019-11-20 PROCEDURE — 93296 REM INTERROG EVL PM/IDS: CPT | Performed by: INTERNAL MEDICINE

## 2019-11-20 PROCEDURE — 93294 REM INTERROG EVL PM/LDLS PM: CPT | Performed by: INTERNAL MEDICINE

## 2019-12-30 RX ORDER — METOPROLOL TARTRATE 50 MG/1
TABLET, FILM COATED ORAL
Qty: 90 TABLET | Refills: 12 | Status: SHIPPED | OUTPATIENT
Start: 2019-12-30 | End: 2021-03-22 | Stop reason: SDUPTHER

## 2020-02-26 ENCOUNTER — PROCEDURE VISIT (OUTPATIENT)
Dept: CARDIOLOGY CLINIC | Age: 80
End: 2020-02-26
Payer: MEDICARE

## 2020-02-26 PROCEDURE — 93294 REM INTERROG EVL PM/LDLS PM: CPT | Performed by: INTERNAL MEDICINE

## 2020-02-26 PROCEDURE — 93296 REM INTERROG EVL PM/IDS: CPT | Performed by: INTERNAL MEDICINE

## 2020-04-20 ENCOUNTER — VIRTUAL VISIT (OUTPATIENT)
Dept: FAMILY MEDICINE CLINIC | Age: 80
End: 2020-04-20

## 2020-04-20 VITALS
HEART RATE: 60 BPM | SYSTOLIC BLOOD PRESSURE: 121 MMHG | WEIGHT: 229 LBS | BODY MASS INDEX: 32.86 KG/M2 | DIASTOLIC BLOOD PRESSURE: 80 MMHG

## 2020-04-20 PROCEDURE — 99213 OFFICE O/P EST LOW 20 MIN: CPT | Performed by: FAMILY MEDICINE

## 2020-04-20 RX ORDER — FAMOTIDINE 20 MG/1
20 TABLET, FILM COATED ORAL DAILY
COMMUNITY
Start: 2020-04-20 | End: 2020-06-08 | Stop reason: ALTCHOICE

## 2020-04-20 ASSESSMENT — ENCOUNTER SYMPTOMS
SINUS PRESSURE: 0
SHORTNESS OF BREATH: 0
ABDOMINAL PAIN: 1
CONSTIPATION: 0

## 2020-04-20 NOTE — PROGRESS NOTES
Susan Santana agreed to Video Chat in presence of Dr Damaris Celeste and myself. Verified who was present in room with Susan Santana. Patient informed the e-mail address used to Face Time cannot be used to contact the Provider, if they are any questions or concerns they need to call the office directly. Susan Santana stated understanding.

## 2020-04-20 NOTE — PROGRESS NOTES
mouth daily  Historical Provider, MD   ciclopirox (LOPROX) 0.77 % cream Apply topically daily as needed Apply topically 2 times daily. Historical Provider, MD   Apoaequorin (PREVAGEN EXTRA STRENGTH) 20 MG CAPS Take by mouth  Historical Provider, MD   furosemide (LASIX) 20 MG tablet TAKE 1 TABLET DAILY  Patient taking differently: 40 mg TAKE 1 TABLET DAILY  Devi Marshall MD   GLUCOSAMINE HCL PO Take by mouth  Historical Provider, MD   aspirin EC 81 MG EC tablet Take 1 tablet by mouth daily. Devi Marshall MD   econazole nitrate 1 % cream Apply  topically 2 times daily. Apply topically daily. Historical Provider, MD   Calcium-Magnesium-Vitamin D (CITRACAL CALCIUM+D) 600- MG-MG-UNIT TB24 Take 1 tablet by mouth Daily. Reinier Espinosa MD       Social History     Tobacco Use    Smoking status: Former Smoker     Packs/day: 1.00     Years: 20.00     Pack years: 20.00     Types: Cigarettes     Last attempt to quit: 1986     Years since quittin.3    Smokeless tobacco: Former User     Types: Chew   Substance Use Topics    Alcohol use: Yes     Alcohol/week: 0.0 standard drinks     Comment: USALLY 1 DRINK DAILY    Drug use: No        PHYSICAL EXAMINATION:  [ INSTRUCTIONS:  \"[x]\" Indicates a positive item  \"[]\" Indicates a negative item  -- DELETE ALL ITEMS NOT EXAMINED]  Vital Signs: (As obtained by patient/caregiver or practitioner observation)    Blood pressure-  Heart rate-    Respiratory rate-    Temperature-  Pulse oximetry-     Constitutional: [x] Appears well-developed and well-nourished [x] No apparent distress      [] Abnormal-   Mental status  [x] Alert and awake  [] Oriented to person/place/time []Able to follow commands      Eyes:  EOM    []  Normal  [] Abnormal-  Sclera  [x]  Normal  [] Abnormal -         Discharge []  None visible  [] Abnormal -    HENT:   [x] Normocephalic, atraumatic.   [] Abnormal   [x] Mouth/Throat: Mucous membranes are moist.     External Ears [x] Normal  [] Abnormal-     Neck: [x] No visualized mass     Pulmonary/Chest: [x] Respiratory effort normal.  [] No visualized signs of difficulty breathing or respiratory distress        [] Abnormal-      Musculoskeletal:   [] Normal gait with no signs of ataxia         [x] Normal range of motion of neck        [] Abnormal-       Neurological:        [x] No Facial Asymmetry (Cranial nerve 7 motor function) (limited exam to video visit)          [] No gaze palsy        [] Abnormal-         Skin:        [x] No significant exanthematous lesions or discoloration noted on facial skin         [] Abnormal-            Psychiatric:       [x] Normal Affect [] No Hallucinations        [] Abnormal-     Other pertinent observable physical exam findings-     ASSESSMENT/PLAN:   Diagnosis Orders   1. Antral gastritis       See me in July     Andrew Cm is a 70 y.o. male being evaluated by a Virtual Visit (video visit) encounter to address concerns as mentioned above. A caregiver was present when appropriate. Due to this being a TeleHealth encounter (During [facility] public health emergency), evaluation of the following organ systems was limited: Vitals/Constitutional/EENT/Resp/CV/GI//MS/Neuro/Skin/Heme-Lymph-Imm. Pursuant to the emergency declaration under the [address] [place] [address] authority and the [employer] Act, this Virtual Visit was conducted with patient's (and/or legal guardian's) consent, to reduce the patient's risk of exposure to COVID-19 and provide necessary medical care. The patient (and/or legal guardian) has also been advised to contact this office for worsening conditions or problems, and seek emergency medical treatment and/or call 911 if deemed necessary. Services were provided through a video synchronous discussion virtually to substitute for in-person clinic visit.  --Katerin Minaya MD on 4/20/2020 at 8:38 AM    An

## 2020-04-20 NOTE — PROGRESS NOTES
No components found for: CHLPL  Lab Results   Component Value Date    TRIG 68 07/31/2018     Lab Results   Component Value Date    HDL 44 07/31/2018     Lab Results   Component Value Date    LDLCALC 77 07/31/2018     Lab Results   Component Value Date    LABVLDL 14 07/31/2018       Lab Results   Component Value Date    ALT 21 07/31/2018    AST 28 07/31/2018    ALKPHOS 89 07/31/2018    BILITOT 0.9 07/31/2018           Is patient currently taking any cholesterol medications? Yes   If yes, see med list as above    Is the patient reporting any side effects of cholesterol medications? No    Is the patient taking any over the counter medications? Yes   If yes, see med list as above    Is the patient taking a daily aspirin? Yes      Patient Self-Management Goal for Chronic Condition  Goal: I will schedule the recommended follow up visit when leaving the office today, and agree to keep the appointment or to reschedule when I call to cancel. Barriers to success: none  Plan for overcoming my barriers: N/A     Confidence: 6/10  Date goal set: 4/20/20  Date goal attained:     Have you seen any other physician or provider since your last visit no    Have you had any other diagnostic tests since your last visit? no    Have you changed or stopped any medications since your last visit including any over-the-counter medicines, vitamins, or herbal medicines? no     Are you taking all your prescribed medications?  Yes    If NO, why?

## 2020-05-22 ENCOUNTER — TELEPHONE (OUTPATIENT)
Dept: CARDIOLOGY CLINIC | Age: 80
End: 2020-05-22

## 2020-05-22 RX ORDER — SIMVASTATIN 10 MG
TABLET ORAL
Qty: 90 TABLET | Refills: 3 | Status: SHIPPED | OUTPATIENT
Start: 2020-05-22 | End: 2020-12-17 | Stop reason: SDUPTHER

## 2020-06-02 ENCOUNTER — PROCEDURE VISIT (OUTPATIENT)
Dept: CARDIOLOGY CLINIC | Age: 80
End: 2020-06-02
Payer: MEDICARE

## 2020-06-02 PROCEDURE — 93296 REM INTERROG EVL PM/IDS: CPT | Performed by: INTERNAL MEDICINE

## 2020-06-02 PROCEDURE — 93294 REM INTERROG EVL PM/LDLS PM: CPT | Performed by: INTERNAL MEDICINE

## 2020-06-08 ENCOUNTER — HOSPITAL ENCOUNTER (OUTPATIENT)
Dept: GENERAL RADIOLOGY | Age: 80
Discharge: HOME | End: 2020-06-08
Payer: MEDICARE

## 2020-06-08 ENCOUNTER — HOSPITAL ENCOUNTER (OUTPATIENT)
Age: 80
Discharge: HOME | End: 2020-06-08
Payer: MEDICARE

## 2020-06-08 ENCOUNTER — OFFICE VISIT (OUTPATIENT)
Dept: CARDIOLOGY CLINIC | Age: 80
End: 2020-06-08
Payer: MEDICARE

## 2020-06-08 VITALS
DIASTOLIC BLOOD PRESSURE: 64 MMHG | SYSTOLIC BLOOD PRESSURE: 124 MMHG | HEART RATE: 61 BPM | HEIGHT: 70 IN | WEIGHT: 226.6 LBS | BODY MASS INDEX: 32.44 KG/M2

## 2020-06-08 PROCEDURE — 71046 X-RAY EXAM CHEST 2 VIEWS: CPT

## 2020-06-08 PROCEDURE — 99214 OFFICE O/P EST MOD 30 MIN: CPT | Performed by: INTERNAL MEDICINE

## 2020-06-08 NOTE — PROGRESS NOTES
taking differently: 40 mg TAKE 1 TABLET DAILY) 90 tablet 3    aspirin EC 81 MG EC tablet Take 1 tablet by mouth daily. 30 tablet 3    econazole nitrate 1 % cream Apply  topically 2 times daily. Apply topically daily.  Calcium-Magnesium-Vitamin D (CITRACAL CALCIUM+D) 600- MG-MG-UNIT TB24 Take 1 tablet by mouth Daily. No current facility-administered medications for this visit. Review of Systems -     General ROS: negative  Psychological ROS: negative  Hematological and Lymphatic ROS: No history of blood clots or bleeding disorder. Respiratory ROS: no cough, shortness of breath, or wheezing  Cardiovascular ROS: no chest pain or dyspnea on exertion  Gastrointestinal ROS: negative  Genito-Urinary ROS: no dysuria, trouble voiding, or hematuria  Musculoskeletal ROS: negative  Neurological ROS: no TIA or stroke symptoms  Dermatological ROS: negative      Blood pressure 124/64, pulse 61, height 5' 10\" (1.778 m), weight 226 lb 9.6 oz (102.8 kg). Physical Examination:    General appearance - alert, well appearing, and in no distress  Mental status - alert, oriented to person, place, and time  Neck - supple, no significant adenopathy, no JVD, or carotid bruits  Chest - clear to auscultation, no wheezes, rales or rhonchi, symmetric air entry  Heart - normal rate, regular rhythm, normal S1, S2, no murmurs, rubs, clicks or gallops  Abdomen - soft, nontender, nondistended, no masses or organomegaly  Neurological - alert, oriented, normal speech, no focal findings or movement disorder noted  Musculoskeletal - no joint tenderness, deformity or swelling  Extremities - peripheral pulses normal, no pedal edema, no clubbing or cyanosis  Skin - normal coloration and turgor, no rashes, no suspicious skin lesions noted    Lab  No results for input(s): CKTOTAL, CKMB, CKMBINDEX, TROPONINI in the last 72 hours.   CBC:   Lab Results   Component Value Date    WBC 5.6 04/09/2016    WBC 7.8 12/17/2013    RBC 4.63 Holter monitor. Holter after CPAP repeated - NO changes actually had 3.8 sec pause- got worse  CONCLUSION: This is an abnormal Holter monitor finding with 100% atrial   fibrillation, average heart rate 46 beats per minute ranging from 25 to 89   beats per minute. There is a pause of 3.8 seconds at 5 a.m. in the morning. Probably the patient was asleep at that time. Minimum heart rate of 25 beats   per minute was also at 5 a.m. in the morning. It is abnormal for frequent ventricular ectopic beats. At this time, the patient is completely asymptomatic at least from the diary. RECOMMENDATION: Needs further clinical correlation. Consider evaluation of   this patient. If the patient needs to be on any beta-blocker, any calcium   channel blocker, or any rate reducing medication, the patient may need a   pacemaker. If the patient has any significant symptoms or any symptomatology, suggestive   consideration should be given for pacemaker placement. The patient has significant bradyarrhythmia. The baseline is bradycardic and   also he has a significant cause of 3.8 seconds. However if the patient is   asymptomatic and in view of the background atrial fibrillation, we will   continue with observation and close follow up. [place] M.D.     EKG: Atrial fibrillation-slow ventricular response rate of 36 BPM-Nonspecific QRS widening and anterior fascicular block.    -Anteroseptal infarct -age undetermined. ABNORMAL     Sleep study _moderate LOVE obstructive AHI of 19    EKG:-V paced rhythm     EKH 9/30/14-Electronic ventricular pacemaker underlying afib    EKG afib with V pacing      Small to moderate right-sided pleural effusion is demonstrated along with right basilar consolidative airspace disease which may represent atelectasis or pneumonia.  However, recommend short-term follow-up to document resolution.               This report has been created using voice recognition software.  It may Hypertension. 5. Hyperlipidemia. 6. Obstructive sleep apnea on C-PAP. 7. Obesity. 8. History of erectile dysfunction. 9. History of dizziness, generalized body weakness, easy   fatigability, feeling sleep probably related to the sick sinus   syndrome and AV with slow ventricular response. That has been   Resolved. nUC STRESS TEST NET jUNE 2013      Plan     The current meds and labs reviewed    Hypertension, on medical treatment. Seems to be under good control. Patient is compliant with medical treatment. SOB ON EXERTION   Recent worsening CHF  NEED ISCHEMIA WORK UP  DECLINED echo and STRESS TEST AND WANT TO CONT   INFORMED TO COME OR CALL IF CHANGE HIS MIND    Congestive heart failure: no evidence of fluid overload today, no recent hospitalization for CHF  Cont  Lasix 40 mg po qd  CXR-4/8/19  Weight measurment   Call if wt gain > 5 lb in 1 weeks  Echo      Hypertension, on medical treatment. Seems to be under good control. Patient is compliant with medical treatment. HTN need better control  Cont  avapro  300 mg po qd  Cont  Norvasc 10 po qd  Pacer interrogation reviewed and d/w the pat Feb 2020  AFIB/ELIQUIS  [employer] dual pacemaker programmed VVIR    ..Battery longevity:2.5 years      Atrial impedance 617  RV impedance 496     P wave sensing none  R wave sensing 5.9     0 % atrial paced  100 % RV paced    Atrial threshold at fib   RV threshold 1.4V at 0.4ms       Hyperlipidemia: on statins, followed periodically. Patient need periodic lipid and liver profile.     Cont  Asa 81 mg po QD  atr fib  Cont-apixaban 5 mg po BID    Discussed the risk and benefit of OA and he want to cont with meds    Off  amiodarone for pat intolerance- pat did well and feel much better    Complete lab prior to next visit    CXR RT Pleural Effusion unchanged  F/u CXR    Stable and sob getting better    Lipid panel and liver function test before next appointment        RTC in 6 month      [place] [facility]

## 2020-06-15 ENCOUNTER — HOSPITAL ENCOUNTER (OUTPATIENT)
Dept: NON INVASIVE DIAGNOSTICS | Age: 80
Discharge: HOME | End: 2020-06-15
Payer: MEDICARE

## 2020-06-15 LAB
LV EF: 55 %
LVEF MODALITY: NORMAL

## 2020-06-15 PROCEDURE — 93306 TTE W/DOPPLER COMPLETE: CPT

## 2020-07-08 LAB
ALBUMIN SERPL-MCNC: 4.2 G/DL (ref 3.2–5.3)
ALK PHOSPHATASE: 81 U/L (ref 39–130)
ALT SERPL-CCNC: 17 U/L (ref 0–40)
AST SERPL-CCNC: 24 U/L (ref 0–41)
BILIRUB SERPL-MCNC: 0.7 MG/DL (ref 0.3–1.2)
BILIRUBIN DIRECT: 0.2 MG/DL (ref 0–0.4)
CHOLESTEROL/HDL RATIO: 2.7 (ref 1–5)
CHOLESTEROL: 128 MG/DL (ref 150–200)
HDLC SERPL-MCNC: 47 MG/DL
LDL CHOLESTEROL CALCULATED: 70 MG/DL
LDL/HDL RATIO: 1.5
TOTAL PROTEIN: 7 G/DL (ref 6–8)
TRIGL SERPL-MCNC: 57 MG/DL (ref 27–150)
VLDLC SERPL CALC-MCNC: 11 MG/DL (ref 0–30)

## 2020-07-15 RX ORDER — AMLODIPINE BESYLATE 10 MG/1
TABLET ORAL
Qty: 90 TABLET | Refills: 1 | Status: SHIPPED | OUTPATIENT
Start: 2020-07-15 | End: 2020-12-17 | Stop reason: SDUPTHER

## 2020-07-30 ASSESSMENT — ENCOUNTER SYMPTOMS
SHORTNESS OF BREATH: 0
SINUS PRESSURE: 0
CONSTIPATION: 0

## 2020-07-31 ENCOUNTER — OFFICE VISIT (OUTPATIENT)
Dept: FAMILY MEDICINE CLINIC | Age: 80
End: 2020-07-31

## 2020-07-31 VITALS
BODY MASS INDEX: 32.93 KG/M2 | RESPIRATION RATE: 16 BRPM | HEART RATE: 60 BPM | WEIGHT: 230 LBS | HEIGHT: 70 IN | SYSTOLIC BLOOD PRESSURE: 126 MMHG | TEMPERATURE: 97.1 F | DIASTOLIC BLOOD PRESSURE: 74 MMHG

## 2020-07-31 PROCEDURE — 99214 OFFICE O/P EST MOD 30 MIN: CPT | Performed by: FAMILY MEDICINE

## 2020-07-31 RX ORDER — VIT C/B6/B5/MAGNESIUM/HERB 173 50-5-6-5MG
1000 CAPSULE ORAL DAILY
COMMUNITY
Start: 2020-07-31 | End: 2021-06-08

## 2020-07-31 ASSESSMENT — PATIENT HEALTH QUESTIONNAIRE - PHQ9
SUM OF ALL RESPONSES TO PHQ QUESTIONS 1-9: 1
2. FEELING DOWN, DEPRESSED OR HOPELESS: 0
1. LITTLE INTEREST OR PLEASURE IN DOING THINGS: 1
SUM OF ALL RESPONSES TO PHQ QUESTIONS 1-9: 1
SUM OF ALL RESPONSES TO PHQ9 QUESTIONS 1 & 2: 1

## 2020-07-31 NOTE — PROGRESS NOTES
weight 230 lb (104.3 kg). Assessment:       Diagnosis Orders   1.  Essential hypertension             Plan:      See me in the Margarita Pacheco MD

## 2020-09-30 RX ORDER — IRBESARTAN 300 MG/1
TABLET ORAL
Qty: 90 TABLET | Refills: 3 | Status: SHIPPED | OUTPATIENT
Start: 2020-09-30 | End: 2021-09-07

## 2020-10-05 ENCOUNTER — OFFICE VISIT (OUTPATIENT)
Dept: PULMONOLOGY | Age: 80
End: 2020-10-05
Payer: MEDICARE

## 2020-10-05 VITALS
OXYGEN SATURATION: 97 % | SYSTOLIC BLOOD PRESSURE: 120 MMHG | HEART RATE: 61 BPM | WEIGHT: 236.6 LBS | HEIGHT: 70 IN | BODY MASS INDEX: 33.87 KG/M2 | TEMPERATURE: 97.8 F | DIASTOLIC BLOOD PRESSURE: 64 MMHG

## 2020-10-05 PROCEDURE — 99214 OFFICE O/P EST MOD 30 MIN: CPT | Performed by: PHYSICIAN ASSISTANT

## 2020-10-05 RX ORDER — DOXEPIN HYDROCHLORIDE 25 MG/1
25 CAPSULE ORAL NIGHTLY
Qty: 30 CAPSULE | Refills: 3 | Status: SHIPPED | OUTPATIENT
Start: 2020-10-05 | End: 2020-12-11 | Stop reason: SDUPTHER

## 2020-10-05 ASSESSMENT — ENCOUNTER SYMPTOMS
COUGH: 0
EYES NEGATIVE: 1
SHORTNESS OF BREATH: 0
CHEST TIGHTNESS: 0
DIARRHEA: 0
BACK PAIN: 0
STRIDOR: 0
WHEEZING: 0
NAUSEA: 0
ALLERGIC/IMMUNOLOGIC NEGATIVE: 1

## 2020-10-05 NOTE — PROGRESS NOTES
[facility]      Gautam Woods         [id number]  10/5/2020   Chief Complaint   Patient presents with    Follow-up     LOVE        Pt of Dr. Wanda CARSON Download:   Original or initial AHI: 19.6     Date of initial study: 8/6/13      Compliant  100%     Noncompliant 0 %     PAP Type cpapLevel  11   Avg Hrs/Day [id number]  AHI: 0.3   Recorded compliance dates , 8/30/20  to 9/28/20   Machine/Mfg:   [x] ResMed    [] Respironics/Dreamstation   Interface:   [] Nasal    [x] Nasal pillows   [] FFM      Provider:      [] [facility]     [][employer]     [] [employer]    [x] Christian Rey    [] Ericietermans               [] [employer]      [] Adaptive    [] [address]      [] Other    Neck Size: 17.25  Mallampati 3  ESS:  8    Here is a scan of the most recent download:          Presentation:   Tyesha Ray presents for sleep medicine follow up for obstructive sleep apnea  Since the last visit, Tyesha Ray is doing well with PAP. He is complaining of insomnia and disrupted sleep pattern. He wakes frequently through the night and occ has trouble falling back asleep. He had been on Ambien in the past and Tylenol PM.      Equipment issues: The pressure is  acceptable, the mask is acceptable     Sleep issues:  Do you feel better? Yes and No  More rested? Yes   Better concentration? yes    Progress History:   Since last visit any new medical issues? No  New ER or hospitlal visits? No  Any new or changes in medicines? No  Any new sleep medicines?  No        Past Medical History:   Diagnosis Date    AAA [facility]abdominal aortic [facility]) [facility] 05/2018    Adenomatous colon polyp 2012    Alzheimer disease ([facility] [address]     early stage    Antral gastritis 7/2012    Arthritis     Atrial fibrillation ([facility] [address] [facility] 2013    AV block, Mobitz 1 10/31/2013    Blood transfusion reaction     Cancer ([facility] [address] [facility] 2010    SKIN CANCER NECK    CHF (congestive heart failure) ([facility] [address] [facility]     GERD with stricture 7/2012    Hyperlipidemia 2008    Hypertension     Nausea & vomiting     LOVE on CPAP     Rheumatic fever 's    S/P cardiac pacemaker procedure: 10/31/2013: [employer] Dual chamber. 10/31/2013    10/31/2013: [employer]. Dual chamber. Dr. Brunner Speak Umbilical hernia [id number]    Vitamin B1 deficiency 2016       Past Surgical History:   Procedure Laterality Date    ANKLE ARTHROCENTESIS      right    APPENDECTOMY      BREAST SURGERY  1988    lumpectomy left    CARDIOVASCULAR STRESS TEST  13    CARDIOVASCULAR STRESS TEST  13    HOLTER     COLONOSCOPY  2009    COLONOSCOPY Left 10/22/2019    COLONOSCOPY DIAGNOSTIC performed by Navdeep Gutiérrez MD at [address]    right inguinal    HERNIA REPAIR  14    Dr. Lyndsey Gurrola hernia repair with mesh    MALIGNANT SKIN LESION EXCISION      left side of neck    PACEMAKER INSERTION  10/31/13    PACEMAKER PLACEMENT  10/2013    SKIN BIOPSY  10/9/14    Dr Tobias Kaye SKIN BIOPSY  4/16/15    Dr Tal Carter  2015    SKIN CANCER EXCISION  2019    THORACENTESIS  03/10/2019    TONSILLECTOMY  1950    TRANSTHORACIC ECHOCARDIOGRAM  13    UPPER GASTROINTESTINAL ENDOSCOPY Left 10/22/2019    EGD ESOPHAGOGASTRODUODENOSCOPY performed by Navdeep Gutiérrez MD at [address] [place] [address]    right       Social History     Tobacco Use    Smoking status: Former Smoker     Packs/day: 1.00     Years: 20.00     Pack years: 20.00     Types: Cigarettes     Last attempt to quit: 1986     Years since quittin.7    Smokeless tobacco: Former User     Types: Chew   Substance Use Topics    Alcohol use:  Yes     Alcohol/week: 0.0 standard drinks     Comment: USALLY 1 DRINK DAILY    Drug use: No       Allergies   Allergen Reactions    Amiodarone      DEPRESSION, CONSTIPATION    Lamisil [Terbinafine] Itching    Morphine Other (See Comments)     hallucinations    Nickel Hives    Adhesive Tape Rash Endocrine: Negative. Genitourinary: Negative. Musculoskeletal: Negative. Negative for arthralgias and back pain. Skin: Negative. Allergic/Immunologic: Negative. Neurological: Negative. Negative for dizziness and light-headedness. Psychiatric/Behavioral: Negative. All other systems reviewed and are negative. Physical Exam:    BMI:  Body mass index is 33.95 kg/m². Wt Readings from Last 3 Encounters:   10/05/20 236 lb 9.6 oz (107.3 kg)   07/31/20 230 lb (104.3 kg)   06/08/20 226 lb 9.6 oz (102.8 kg)     Weight stable / unchanged  Vitals: /64 (Site: Left Upper Arm, Position: Sitting, Cuff Size: Medium Adult)   Pulse 61   Temp 97.8 °F (36.6 °C)   Ht 5' 10\" (1.778 m)   Wt 236 lb 9.6 oz (107.3 kg)   SpO2 97% Comment: Room air at rest  BMI 33.95 kg/m²       Physical Exam  Constitutional:       Appearance: He is well-developed. HENT:      Head: Normocephalic and atraumatic. Right Ear: External ear normal.      Left Ear: External ear normal.   Eyes:      Conjunctiva/sclera: Conjunctivae normal.      Pupils: Pupils are equal, round, and reactive to light. Neck:      Musculoskeletal: Normal range of motion and neck supple. Cardiovascular:      Rate and Rhythm: Normal rate and regular rhythm. Heart sounds: Normal heart sounds. Pulmonary:      Effort: Pulmonary effort is normal.      Breath sounds: Normal breath sounds. Musculoskeletal: Normal range of motion. Skin:     General: Skin is warm and dry. Neurological:      Mental Status: He is alert and oriented to person, place, and time. Psychiatric:         Behavior: Behavior normal.         Thought Content: Thought content normal.         Judgment: Judgment normal.           ASSESSMENT/DIAGNOSIS     Diagnosis Orders   1. Obstructive sleep apnea on CPAP     2. Obesity, Class I, BMI 30-34.9     3. Other insomnia              Plan   Do you need any equipment today?  Yes update supplies  - will try on Doxepin for insomnia  - He  was advised to continue current positive airway pressure therapy with above described pressure. - He  advised to keep good compliance with current recommended pressure to get optimal results and clinical improvement  - Recommend 7-9 hours of sleep with PAP  - He was advised to call [employer] regarding supplies if needed.   -He call my office for earlier appointment if needed for worsening of sleep symptoms.   - He was instructed on weight loss  - Jud Short was educated about my impression and plan. Patient verbalizesunderstanding.   We will see Uziel Romero back in: 2 months with download    Information added by my medical assistant/LPN was reviewed today         Sneha Mejía PA-C, MPAS  10/5/2020

## 2020-10-08 ENCOUNTER — PROCEDURE VISIT (OUTPATIENT)
Dept: CARDIOLOGY CLINIC | Age: 80
End: 2020-10-08
Payer: MEDICARE

## 2020-10-08 PROCEDURE — 93280 PM DEVICE PROGR EVAL DUAL: CPT | Performed by: INTERNAL MEDICINE

## 2020-10-08 NOTE — PROGRESS NOTES
[employer] dual pacer  Battery 2 yrs  Mode VVIR  V paced 100%  r wave >=80% paced  Ventricle threshold 1.5V @ 0.4ms  Ventricle impedance 504ohms  Atrial impedance 645 ohms  Episode NS VT 5 beats   Faxed to Dr Iqra Frye in [facility]

## 2020-11-03 PROBLEM — I48.91 ATRIAL FIBRILLATION (HCC): Status: RESOLVED | Noted: 2020-11-03 | Resolved: 2020-11-03

## 2020-12-09 ASSESSMENT — ENCOUNTER SYMPTOMS
EYES NEGATIVE: 1
NAUSEA: 0
DIARRHEA: 0
CHEST TIGHTNESS: 0
STRIDOR: 0
ALLERGIC/IMMUNOLOGIC NEGATIVE: 1
WHEEZING: 0
BACK PAIN: 0
COUGH: 0
SHORTNESS OF BREATH: 0

## 2020-12-09 NOTE — PROGRESS NOTES
[facility]      Rivka Laurent         [id number]  2020   Chief Complaint   Patient presents with    Follow-up     LOVE             PAP Download:   Original or initial AHI: 19.6     Date of initial study: 13      Compliant  100%     Noncompliant 0 %     PAP Type CPAP Level  11   Avg Hrs/Day 10 hr 10 min  AHI: 0.5     Machine/Mfg:   [x] ResMed    [] Respironics/Dreamstation   Interface:   [] Nasal    [] Nasal pillows   [] FFM      Provider:      [] -TARIK     []Hira     [] [employer]ier[employer]    [x] Chadwick Fuentes    [] Danis               [] [facility] [facility]      [] Adaptive    [] [address]      [] Other    Here is a scan of the most recent download:          Odin Sleepiness Score:   Sitting and readin  Watching TV:2  Sitting inactive in a public place:0  Being a passenger in a motor vehicle for an hour or more:0  Lying down in the afternoon:1  Sitting and talking to someone:0  Sitting quietly after lunch (no alcohol):0  Stopped for a few minutes in traffic while drivin  Total Score:3    Presentation:   Wu Sal presents for sleep medicine follow up for obstructive sleep apnea, insomnia  Since the last visit, Wu Sal has been doing well with PAP. He was started on Doxepin for insomnia. He is sleeping better but still wakes occ. He feels rested. Equipment issues: The pressure is acceptable, the mask is acceptable     Sleep issues:  Do you feel better? Yes  More rested? Yes   Better concentration? yes    Progress History:   Since last visit any new medical issues? No  New ER or hospitlal visits? No  Any new or changes in medicines? No  Any new sleep medicines?  No        Past Medical History:   Diagnosis Date    AAA (abdominal aortic aneurysm) ([facility] [address] 2018    Adenomatous colon polyp     Alzheimer disease ([facility] [address]     early stage    Antral gastritis 2012    Arthritis     Atrial fibrillation ([facility] [address] 2013    AV block, Mobitz 1 10/31/2013    Blood transfusion reaction     Cancer ([facility] [address] 2010    SKIN CANCER NECK    CHF (congestive heart failure) ([facility] [address]     GERD with stricture 2012    Hyperlipidemia 2008    Hypertension 2005    Nausea & vomiting     LOVE on CPAP     Rheumatic fever 's    S/P cardiac pacemaker procedure: 10/31/2013: [address] Dual chamber. 10/31/2013    10/31/2013: [address]. Dual chamber. Dr. Светлана Knight Umbilical hernia [id number]    Vitamin B1 deficiency 2016       Past Surgical History:   Procedure Laterality Date    ANKLE ARTHROCENTESIS      right    APPENDECTOMY      BREAST SURGERY  1988    lumpectomy left    CARDIOVASCULAR STRESS TEST  13    CARDIOVASCULAR STRESS TEST  13    HOLTER     COLONOSCOPY  2009    COLONOSCOPY Left 10/22/2019    COLONOSCOPY DIAGNOSTIC performed by Vi Cannon MD at [address] [facility] [address]    right inguinal    HERNIA REPAIR  14    Dr. Jermain Watson hernia repair with mesh    MALIGNANT SKIN LESION EXCISION      left side of neck    PACEMAKER INSERTION  10/31/13    PACEMAKER PLACEMENT  10/2013    SKIN BIOPSY  10/9/14    Dr Gutierrez [place] SKIN BIOPSY  4/16/15    Dr Agustin Cain  -    SKIN CANCER EXCISION  2019    THORACENTESIS  03/10/2019    TONSILLECTOMY  1950    TRANSTHORACIC ECHOCARDIOGRAM  13    UPPER GASTROINTESTINAL ENDOSCOPY Left 10/22/2019    EGD ESOPHAGOGASTRODUODENOSCOPY performed by Vi Cannon MD at [address] [place] [address]    right       Social History     Tobacco Use    Smoking status: Former Smoker     Packs/day: 1.00     Years: 20.00     Pack years: 20.00     Types: Cigarettes     Last attempt to quit: 1986     Years since quittin.9    Smokeless tobacco: Former User     Types: Chew   Substance Use Topics    Alcohol use:  Yes     Alcohol/week: 0.0 standard drinks     Comment: USALLY 1 DRINK DAILY    Drug use: No       Allergies   Allergen Reactions    Amiodarone      DEPRESSION, CONSTIPATION    Lamisil [Terbinafine] Itching    Morphine Other (See Comments)     hallucinations    Nickel Hives    Adhesive Tape Rash       Current Outpatient Medications   Medication Sig Dispense Refill    doxepin (SINEQUAN) 25 MG capsule Take 1 capsule by mouth nightly 30 capsule 3    irbesartan (AVAPRO) 300 MG tablet TAKE 1 TABLET DAILY 90 tablet 3    Apoaequorin (PREVAGEN EXTRA STRENGTH) 20 MG CAPS Take 1 capsule by mouth daily      Turmeric 500 MG CAPS Take 1,000 mg by mouth daily      amLODIPine (NORVASC) 10 MG tablet TAKE 1 TABLET DAILY 90 tablet 1    simvastatin (ZOCOR) 10 MG tablet TAKE 1 TABLET NIGHTLY 90 tablet 3    apixaban (ELIQUIS) 5 MG TABS tablet TAKE 1 TABLET TWICE A  tablet 3    metoprolol tartrate (LOPRESSOR) 50 MG tablet TAKE ONE AND ONE-HALF TABLETS TWICE A DAY 90 tablet 12    amoxicillin (AMOXIL) 500 MG capsule Take 500 mg by mouth 3 times daily      potassium chloride (KLOR-CON M) 10 MEQ extended release tablet Take 10 mEq by mouth daily      ciclopirox (LOPROX) 0.77 % cream Apply topically daily as needed Apply topically 2 times daily.  furosemide (LASIX) 20 MG tablet TAKE 1 TABLET DAILY (Patient taking differently: 40 mg TAKE 1 TABLET DAILY) 90 tablet 3    aspirin EC 81 MG EC tablet Take 1 tablet by mouth daily. 30 tablet 3    econazole nitrate 1 % cream Apply  topically 2 times daily. Apply topically daily.  Calcium-Magnesium-Vitamin D (CITRACAL CALCIUM+D) 600- MG-MG-UNIT TB24 Take 1 tablet by mouth Daily. No current facility-administered medications for this visit. Family History   Problem Relation Age of Onset    Dementia Mother     Cancer Mother         breast        Review of Systems -   Review of Systems   Constitutional: Negative for activity change, appetite change, chills and fever. HENT: Negative for congestion and postnasal drip. Eyes: Negative.     Respiratory: Negative for cough, chest tightness, shortness of breath, wheezing and stridor. Cardiovascular: Negative for chest pain and leg swelling. Gastrointestinal: Negative for diarrhea and nausea. Endocrine: Negative. Genitourinary: Negative. Musculoskeletal: Negative. Negative for arthralgias and back pain. Skin: Negative. Allergic/Immunologic: Negative. Neurological: Negative. Negative for dizziness and light-headedness. Psychiatric/Behavioral: Negative. All other systems reviewed and are negative. Physical Exam:    BMI:  There is no height or weight on file to calculate BMI. Wt Readings from Last 3 Encounters:   10/05/20 236 lb 9.6 oz (107.3 kg)   07/31/20 230 lb (104.3 kg)   06/08/20 226 lb 9.6 oz (102.8 kg)     Weight stable / unchanged  Vitals: There were no vitals taken for this visit. Physical Exam  Constitutional:       Appearance: Normal appearance. He is normal weight. HENT:      Head: Normocephalic and atraumatic. Right Ear: External ear normal.      Left Ear: External ear normal.      Nose: Nose normal.   Eyes:      Extraocular Movements: Extraocular movements intact. Conjunctiva/sclera: Conjunctivae normal.      Pupils: Pupils are equal, round, and reactive to light. Neck:      Musculoskeletal: Normal range of motion and neck supple. Pulmonary:      Effort: Pulmonary effort is normal.   Neurological:      General: No focal deficit present. Mental Status: He is alert and oriented to person, place, and time. Psychiatric:         Attention and Perception: Attention and perception normal.         Mood and Affect: Mood and affect normal.         Speech: Speech normal.         Behavior: Behavior normal. Behavior is cooperative. Thought Content: Thought content normal.         Cognition and Memory: Cognition normal.         Judgment: Judgment normal.           ASSESSMENT/DIAGNOSIS     Diagnosis Orders   1. Obstructive sleep apnea on CPAP     2.  Obesity, Class I, BMI 30-34.9     3. Other insomnia              Plan   Do you need any equipment today? No  - Will increase Doxepin to 1-2 tabs at night  - He  was advised to continue current positive airway pressure therapy with above described pressure. - He  advised to keep good compliance with current recommended pressure to get optimal results and clinical improvement  - Recommend 7-9 hours of sleep with PAP  - He was advised to call [employer] company regarding supplies if needed.   -He call my office for earlier appointment if needed for worsening of sleep symptoms.   - He was instructed on weight loss  - Matt Degroot was educated about my impression and plan. Patient verbalizesunderstanding. We will see Yudi Jose back in: 5 months with download    Information added by my medical assistant/LPN was reviewed today        RiccardoPHU Ledezma  12/11/2020        Matt Degroot is being evaluated by a Virtual Visit (video visit) encounter to address concerns as mentioned above. A caregiver was present when appropriate. Due to this being a TeleHealth encounter (During [facility]-25 public health emergency), evaluation of the following organ systems was limited: Vitals/Constitutional/EENT/Resp/CV/GI//MS/Neuro/Skin/Heme-Lymph-Imm. Pursuant to the emergency declaration under the [address] [place] [address] authority and the [employer] Act, this Virtual Visit was conducted with patient's (and/or legal guardian's) consent, to reduce the patient's risk of exposure to COVID-19 and provide necessary medical care. The patient (and/or legal guardian) has also been advised to contact this office for worsening conditions or problems, and seek emergency medical treatment and/or call 911 if deemed necessary. Services were provided through a video synchronous discussion virtually to substitute for in-person clinic visit.  Patient and provider were located at their individual [facility]. Patient identification was verified at the start of the visit. Total time spent on this encounter was 25 min     PHU Bird PA-C  12/11/2020      An electronic signature was used to authenticate this note.

## 2020-12-11 ENCOUNTER — TELEMEDICINE (OUTPATIENT)
Dept: PULMONOLOGY | Age: 80
End: 2020-12-11
Payer: MEDICARE

## 2020-12-11 PROCEDURE — 99214 OFFICE O/P EST MOD 30 MIN: CPT | Performed by: PHYSICIAN ASSISTANT

## 2020-12-11 RX ORDER — DOXEPIN HYDROCHLORIDE 25 MG/1
CAPSULE ORAL
Qty: 180 CAPSULE | Refills: 3 | Status: SHIPPED | OUTPATIENT
Start: 2020-12-11 | End: 2021-11-22

## 2020-12-17 RX ORDER — FUROSEMIDE 20 MG/1
TABLET ORAL
Qty: 90 TABLET | Refills: 3 | Status: CANCELLED | OUTPATIENT
Start: 2020-12-17

## 2020-12-17 NOTE — TELEPHONE ENCOUNTER
Tatiana Rosales called requesting a refill on the following medications:  Requested Prescriptions     Pending Prescriptions Disp Refills    amLODIPine (NORVASC) 10 MG tablet 90 tablet 1    simvastatin (ZOCOR) 10 MG tablet 90 tablet 3    potassium chloride (KLOR-CON M) 10 MEQ extended release tablet 60 tablet      Sig: Take 1 tablet by mouth daily    furosemide (LASIX) 20 MG tablet       Sig: Take 2 tablets by mouth daily TAKE 1 TABLET DAILY   Pharmacy verified:  [facility]    Date of last visit: 06/08/2020  Date of next visit (if applicable): 19/50/8728

## 2020-12-18 RX ORDER — SIMVASTATIN 10 MG
10 TABLET ORAL NIGHTLY
Qty: 90 TABLET | Refills: 3 | Status: SHIPPED | OUTPATIENT
Start: 2020-12-18 | End: 2022-01-19

## 2020-12-18 RX ORDER — AMLODIPINE BESYLATE 10 MG/1
10 TABLET ORAL DAILY
Qty: 90 TABLET | Refills: 3 | Status: SHIPPED | OUTPATIENT
Start: 2020-12-18 | End: 2021-12-14

## 2020-12-18 RX ORDER — POTASSIUM CHLORIDE 750 MG/1
10 TABLET, EXTENDED RELEASE ORAL DAILY
Qty: 180 TABLET | Refills: 3 | Status: SHIPPED | OUTPATIENT
Start: 2020-12-18 | End: 2022-02-18

## 2020-12-18 RX ORDER — FUROSEMIDE 40 MG/1
40 TABLET ORAL DAILY
Qty: 90 TABLET | Refills: 3 | Status: SHIPPED | OUTPATIENT
Start: 2020-12-18 | End: 2022-02-11

## 2021-01-04 ENCOUNTER — TELEPHONE (OUTPATIENT)
Dept: CARDIOLOGY CLINIC | Age: 81
End: 2021-01-04

## 2021-01-12 ENCOUNTER — PROCEDURE VISIT (OUTPATIENT)
Dept: CARDIOLOGY CLINIC | Age: 81
End: 2021-01-12
Payer: MEDICARE

## 2021-01-12 DIAGNOSIS — Z95.0 S/P CARDIAC PACEMAKER PROCEDURE: Primary | ICD-10-CM

## 2021-01-12 PROCEDURE — 93294 REM INTERROG EVL PM/LDLS PM: CPT | Performed by: INTERNAL MEDICINE

## 2021-01-12 PROCEDURE — 93296 REM INTERROG EVL PM/IDS: CPT | Performed by: INTERNAL MEDICINE

## 2021-01-15 NOTE — PROGRESS NOTES
Device Interrogation Reviewed.   NSVT noted  Noted on previous interrogation as well  Intolerant to amiod  On lopressor  No new action

## 2021-01-18 DIAGNOSIS — K29.50 ANTRAL GASTRITIS: ICD-10-CM

## 2021-01-18 RX ORDER — OMEPRAZOLE 40 MG/1
CAPSULE, DELAYED RELEASE ORAL
Qty: 90 CAPSULE | Refills: 3 | OUTPATIENT
Start: 2021-01-18

## 2021-01-18 RX ORDER — OMEPRAZOLE 40 MG/1
40 CAPSULE, DELAYED RELEASE ORAL DAILY
Qty: 90 CAPSULE | Refills: 3 | Status: SHIPPED | OUTPATIENT
Start: 2021-01-18 | End: 2021-06-08

## 2021-01-18 NOTE — TELEPHONE ENCOUNTER
Date of last visit:  7/31/2020  Date of next visit:  Visit date not found    Requested Prescriptions     Pending Prescriptions Disp Refills    omeprazole (PRILOSEC) 40 MG delayed release capsule 90 capsule 3     Sig: TAKE 1 CAPSULE DAILY

## 2021-01-18 NOTE — TELEPHONE ENCOUNTER
Spoke with Wife- she did request it. He has not been taking it but he had surgery this week and his Lenin Dago is flaring up so they requested it since he did not have anymore refills on it.

## 2021-02-01 ENCOUNTER — TELEPHONE (OUTPATIENT)
Dept: PULMONOLOGY | Age: 81
End: 2021-02-01

## 2021-03-22 RX ORDER — METOPROLOL TARTRATE 50 MG/1
TABLET, FILM COATED ORAL
Qty: 270 TABLET | Refills: 0 | Status: SHIPPED | OUTPATIENT
Start: 2021-03-22 | End: 2021-06-21

## 2021-04-13 ENCOUNTER — PROCEDURE VISIT (OUTPATIENT)
Dept: CARDIOLOGY CLINIC | Age: 81
End: 2021-04-13
Payer: MEDICARE

## 2021-04-13 DIAGNOSIS — Z95.0 S/P CARDIAC PACEMAKER PROCEDURE: Primary | ICD-10-CM

## 2021-04-13 PROCEDURE — 93294 REM INTERROG EVL PM/LDLS PM: CPT | Performed by: INTERNAL MEDICINE

## 2021-04-13 PROCEDURE — 93296 REM INTERROG EVL PM/IDS: CPT | Performed by: INTERNAL MEDICINE

## 2021-04-13 NOTE — PROGRESS NOTES
DR Luis Miguel Lucio PT Maday La AFIB Sydnee Pickett     [employer] DUAL PACEMAKER REMOTE   BATTERY 1.5 YRS REMAINING  ATRIAL IMPEDENCE 614  VENT IMPEDENCE 491  P WAVES AND RV WAVES NOT MEASURED PER THE DEVICE   NO ATRIAL THRESHOLD OBTAINED PER THE DEVICE     VENT THRESHOLD PER THE DEVICE 1.5 2 0.4    A PACED 100%  V PACED 1005  VVIR     EPISODES OF AFIB WITH RVR VERSES NS VT   LONGEST 12 SECONDS

## 2021-04-16 ENCOUNTER — TELEPHONE (OUTPATIENT)
Dept: CARDIOLOGY CLINIC | Age: 81
End: 2021-04-16

## 2021-04-16 DIAGNOSIS — I50.32 DIASTOLIC CHF, CHRONIC (HCC): Primary | ICD-10-CM

## 2021-04-16 NOTE — TELEPHONE ENCOUNTER
Patient is still in [place]. Labs were faxed to patient. Patient will have the labs done today.   Please keep open for results

## 2021-04-16 NOTE — TELEPHONE ENCOUNTER
Arielle has already talked with this pt, labs already faxed, aware of different events of fast HR, appt has already been scheduledl    Is in [place], June 8th soonest pt can be here       Julio Jang LPN at 8/87/3208  9:58 AM    Status: Signed      DR Mike Venegas PT Dhaval Click AFIB Ruth Barthel      [employer] DUAL PACEMAKER REMOTE   BATTERY 1.5 YRS REMAINING  ATRIAL IMPEDENCE 614  VENT IMPEDENCE 491  P WAVES AND RV WAVES NOT MEASURED PER THE DEVICE   NO ATRIAL THRESHOLD OBTAINED PER THE DEVICE      VENT THRESHOLD PER THE DEVICE 1.5 2 0.4     A PACED 100%  V PACED 1005  VVIR      EPISODES OF AFIB WITH RVR VERSES NS VT   LONGEST 12 SECONDS         Chikis Valdes MD at 4/15/2021 10:41 PM    Status: Signed      Device Interrogation Reviewed.   Episodes of nSVT noted  Need lab asap  F/u with lab to office asap  BMp, mg  LFT  LP

## 2021-04-16 NOTE — TELEPHONE ENCOUNTER
----- Message from Dilcia Landers MD sent at 4/15/2021 10:42 PM EDT -----      ----- Message -----  From: Genevieve Shrestha LPN  Sent: 1/51/6527   8:21 AM EDT  To:  Dilcia Landers MD

## 2021-04-20 NOTE — TELEPHONE ENCOUNTER
Pt has appt in June, as he is in [place] until May sometimes. He said he got more blood work today, and they will send it to us.

## 2021-05-11 ENCOUNTER — OFFICE VISIT (OUTPATIENT)
Dept: PULMONOLOGY | Age: 81
End: 2021-05-11
Payer: MEDICARE

## 2021-05-11 VITALS
OXYGEN SATURATION: 98 % | WEIGHT: 245.2 LBS | TEMPERATURE: 97.8 F | HEART RATE: 60 BPM | BODY MASS INDEX: 35.1 KG/M2 | HEIGHT: 70 IN | DIASTOLIC BLOOD PRESSURE: 64 MMHG | SYSTOLIC BLOOD PRESSURE: 122 MMHG

## 2021-05-11 DIAGNOSIS — G47.09 OTHER INSOMNIA: ICD-10-CM

## 2021-05-11 DIAGNOSIS — E66.9 OBESITY, CLASS I, BMI 30-34.9: ICD-10-CM

## 2021-05-11 DIAGNOSIS — G47.33 OBSTRUCTIVE SLEEP APNEA ON CPAP: Primary | ICD-10-CM

## 2021-05-11 DIAGNOSIS — Z99.89 OBSTRUCTIVE SLEEP APNEA ON CPAP: Primary | ICD-10-CM

## 2021-05-11 PROCEDURE — 99213 OFFICE O/P EST LOW 20 MIN: CPT | Performed by: PHYSICIAN ASSISTANT

## 2021-05-11 ASSESSMENT — ENCOUNTER SYMPTOMS
ALLERGIC/IMMUNOLOGIC NEGATIVE: 1
SHORTNESS OF BREATH: 0
WHEEZING: 0
DIARRHEA: 0
EYES NEGATIVE: 1
NAUSEA: 0
COUGH: 0
CHEST TIGHTNESS: 0
BACK PAIN: 0
STRIDOR: 0

## 2021-05-11 NOTE — PROGRESS NOTES
Negative. Allergic/Immunologic: Negative. Neurological: Negative. Negative for dizziness and light-headedness. Psychiatric/Behavioral: Negative. All other systems reviewed and are negative. Physical Exam:    BMI:  Body mass index is 35.18 kg/m². Wt Readings from Last 3 Encounters:   05/11/21 245 lb 3.2 oz (111.2 kg)   10/05/20 236 lb 9.6 oz (107.3 kg)   07/31/20 230 lb (104.3 kg)     Weight gained 15 lbs over 10 months  Vitals: /64 (Site: Right Upper Arm, Position: Sitting, Cuff Size: Large Adult)   Pulse 60   Temp 97.8 °F (36.6 °C) (Temporal)   Ht 5' 10\" (1.778 m)   Wt 245 lb 3.2 oz (111.2 kg)   SpO2 98% Comment: Room air at rest  BMI 35.18 kg/m²       Physical Exam  Constitutional:       Appearance: He is well-developed. HENT:      Head: Normocephalic and atraumatic. Right Ear: External ear normal.      Left Ear: External ear normal.   Eyes:      Conjunctiva/sclera: Conjunctivae normal.      Pupils: Pupils are equal, round, and reactive to light. Neck:      Musculoskeletal: Normal range of motion and neck supple. Cardiovascular:      Rate and Rhythm: Normal rate and regular rhythm. Heart sounds: Normal heart sounds. Pulmonary:      Effort: Pulmonary effort is normal.      Breath sounds: Normal breath sounds. Musculoskeletal: Normal range of motion. Skin:     General: Skin is warm and dry. Neurological:      Mental Status: He is alert and oriented to person, place, and time. Psychiatric:         Behavior: Behavior normal.         Thought Content: Thought content normal.         Judgment: Judgment normal.           ASSESSMENT/DIAGNOSIS     Diagnosis Orders   1. Obstructive sleep apnea on CPAP     2. Obesity, Class I, BMI 30-34.9     3. Other insomnia              Plan   Do you need any equipment today?  No  - Continue Doxepin for insomnia  - Download reviewed and discussed with patient  - He  was advised to continue current positive airway pressure therapy with above described pressure. - He  advised to keep good compliance with current recommended pressure to get optimal results and clinical improvement  - Recommend 7-9 hours of sleep with PAP  - He was advised to call [employer] company regarding supplies if needed.   -He call my office for earlier appointment if needed for worsening of sleep symptoms.   - He was instructed on weight loss  - Edwarrachel Mcmillan was educated about my impression and plan. Patient verbalizesunderstanding.   We will see Dean Hernandez back in: 1 year with download    Information added by my medical assistant/LPN was reviewed today         Tram Graham PA-C, MPAS  5/11/2021

## 2021-05-14 ENCOUNTER — TELEPHONE (OUTPATIENT)
Dept: FAMILY MEDICINE CLINIC | Age: 81
End: 2021-05-14

## 2021-05-14 ENCOUNTER — OFFICE VISIT (OUTPATIENT)
Dept: FAMILY MEDICINE CLINIC | Age: 81
End: 2021-05-14

## 2021-05-14 VITALS
TEMPERATURE: 96.3 F | DIASTOLIC BLOOD PRESSURE: 68 MMHG | SYSTOLIC BLOOD PRESSURE: 130 MMHG | HEIGHT: 70 IN | RESPIRATION RATE: 16 BRPM | HEART RATE: 60 BPM | WEIGHT: 241.5 LBS | BODY MASS INDEX: 34.57 KG/M2

## 2021-05-14 DIAGNOSIS — I50.31 DIASTOLIC CHF, ACUTE (HCC): ICD-10-CM

## 2021-05-14 DIAGNOSIS — I71.40 ABDOMINAL AORTIC ANEURYSM (AAA) WITHOUT RUPTURE: ICD-10-CM

## 2021-05-14 PROCEDURE — 99214 OFFICE O/P EST MOD 30 MIN: CPT | Performed by: FAMILY MEDICINE

## 2021-05-14 SDOH — ECONOMIC STABILITY: TRANSPORTATION INSECURITY
IN THE PAST 12 MONTHS, HAS THE LACK OF TRANSPORTATION KEPT YOU FROM MEDICAL APPOINTMENTS OR FROM GETTING MEDICATIONS?: NO

## 2021-05-14 SDOH — ECONOMIC STABILITY: TRANSPORTATION INSECURITY
IN THE PAST 12 MONTHS, HAS LACK OF TRANSPORTATION KEPT YOU FROM MEETINGS, WORK, OR FROM GETTING THINGS NEEDED FOR DAILY LIVING?: NO

## 2021-05-14 ASSESSMENT — ENCOUNTER SYMPTOMS
SHORTNESS OF BREATH: 1
SINUS PRESSURE: 0
CONSTIPATION: 0

## 2021-05-14 ASSESSMENT — PATIENT HEALTH QUESTIONNAIRE - PHQ9
1. LITTLE INTEREST OR PLEASURE IN DOING THINGS: 1
SUM OF ALL RESPONSES TO PHQ QUESTIONS 1-9: 2
SUM OF ALL RESPONSES TO PHQ QUESTIONS 1-9: 2

## 2021-05-14 NOTE — PROGRESS NOTES
Sharyle Batten (:  1940) is a 68 y.o. male,Established patient, here for evaluation of the following chief complaint(s):  Hypertension and Congestive Heart Failure      ASSESSMENT/PLAN:   Diagnosis Orders   1. Diastolic CHF, acute ([address]     2. Abdominal aortic aneurysm (AAA) without rupture ([address]  US SCREENING FOR AAA       Get the ultrasound of the aorta  See me in 3 months    SUBJECTIVE/OBJECTIVE:  HPI  1. He has the pacemaker for the sick sinus  2. He sees the cardiologist soon  3. He notices floaters in the vision  Review of Systems   Constitutional: Positive for fatigue. HENT: Negative for sinus pressure. Eyes: Negative for visual disturbance. Respiratory: Positive for shortness of breath. Cardiovascular: Negative for chest pain. Gastrointestinal: Negative for constipation. Genitourinary: Negative. Musculoskeletal: Positive for arthralgias and joint swelling. Skin: Negative for rash. Neurological: Positive for weakness. Negative for headaches. Psychiatric/Behavioral: Positive for decreased concentration. The patient's medications, allergies, past medical problems, surgical, social, and family histories were reviewed and updated as needed. Physical Exam  Constitutional:       General: He is not in acute distress. Appearance: He is well-developed. HENT:      Head: Normocephalic and atraumatic. Right Ear: Tympanic membrane normal.      Left Ear: Tympanic membrane normal.   Eyes:      General: No scleral icterus. Conjunctiva/sclera: Conjunctivae normal.   Neck:      Trachea: No tracheal deviation. Cardiovascular:      Rate and Rhythm: Normal rate and regular rhythm. Heart sounds: Normal heart sounds. Pulmonary:      Effort: Pulmonary effort is normal.      Breath sounds: Normal breath sounds. Skin:     General: Skin is warm and dry. Neurological:      Mental Status: He is alert and oriented to person, place, and time.    Psychiatric:         Behavior: Behavior normal.     Blood pressure 130/68, pulse 60, temperature 96.3 °F (35.7 °C), temperature source Skin, resp. rate 16, height 5' 10\" (1.778 m), weight 241 lb 8 oz (109.5 kg). An electronic signature was used to authenticate this note.     --Enid Closs, MD

## 2021-05-17 ENCOUNTER — TELEPHONE (OUTPATIENT)
Dept: CARDIOLOGY CLINIC | Age: 81
End: 2021-05-17

## 2021-05-17 NOTE — TELEPHONE ENCOUNTER
Talked with wife, Dana Kinsey, per [employer], it appears, at some point, someone turned on the atrial lead which is giving the 100% at paced counter. Device is  currrently programmed VVIR. So, if even for a second they turned on the atrial lead, this would be why 100% at paced is recorded. Not sure why it will be paced if pt is in at fib or if he is not why they did not turn on at lead, but again, appears this was done sometime during their stay in [place]. Scheduled for our clinic in June with Roula balderrama so we can clear the counters. (and check again for at fib)  Either way, wife aware, no harm to Gigi Pinzon.

## 2021-05-17 NOTE — TELEPHONE ENCOUNTER
I called Neftali Darnell with [employer] and he is going to check with tech services to find out why this happened. I sent a pt a message in my chart and told him as soon as we get an answer from tech services, the engineers at [place], we will let him know.

## 2021-05-17 NOTE — TELEPHONE ENCOUNTER
Why is this report & the report from the January 2021 report indicating 100% pace on the [facility], when all prior reports list a 0% pace for the [facility]?     Thank You,  Sebas Danielle

## 2021-05-19 ENCOUNTER — HOSPITAL ENCOUNTER (OUTPATIENT)
Dept: ULTRASOUND IMAGING | Age: 81
Discharge: HOME | End: 2021-05-19
Payer: MEDICARE

## 2021-05-19 DIAGNOSIS — I71.40 ABDOMINAL AORTIC ANEURYSM (AAA) WITHOUT RUPTURE: ICD-10-CM

## 2021-05-19 PROCEDURE — 76775 US EXAM ABDO BACK WALL LIM: CPT

## 2021-05-20 DIAGNOSIS — I71.40 ABDOMINAL AORTIC ANEURYSM (AAA) WITHOUT RUPTURE: Primary | ICD-10-CM

## 2021-05-21 ENCOUNTER — TELEPHONE (OUTPATIENT)
Dept: FAMILY MEDICINE CLINIC | Age: 81
End: 2021-05-21

## 2021-05-21 NOTE — TELEPHONE ENCOUNTER
----- Message from Alfred Perez MD sent at 5/20/2021  9:42 PM EDT -----  Let him know that the ultrasound showed some increase in the size of the aneurysm.  It would be good to check another US in a year

## 2021-06-08 ENCOUNTER — NURSE ONLY (OUTPATIENT)
Dept: CARDIOLOGY CLINIC | Age: 81
End: 2021-06-08
Payer: MEDICARE

## 2021-06-08 ENCOUNTER — OFFICE VISIT (OUTPATIENT)
Dept: CARDIOLOGY CLINIC | Age: 81
End: 2021-06-08
Payer: MEDICARE

## 2021-06-08 ENCOUNTER — TELEPHONE (OUTPATIENT)
Dept: CARDIOLOGY CLINIC | Age: 81
End: 2021-06-08

## 2021-06-08 VITALS
DIASTOLIC BLOOD PRESSURE: 79 MMHG | SYSTOLIC BLOOD PRESSURE: 132 MMHG | WEIGHT: 241.8 LBS | HEART RATE: 59 BPM | BODY MASS INDEX: 34.62 KG/M2 | HEIGHT: 70 IN

## 2021-06-08 DIAGNOSIS — I71.40 ABDOMINAL AORTIC ANEURYSM (AAA) WITHOUT RUPTURE: ICD-10-CM

## 2021-06-08 DIAGNOSIS — I50.32 CHRONIC DIASTOLIC HEART FAILURE (HCC): Primary | ICD-10-CM

## 2021-06-08 DIAGNOSIS — Z95.0 S/P CARDIAC PACEMAKER PROCEDURE: Primary | ICD-10-CM

## 2021-06-08 DIAGNOSIS — Z95.0 HISTORY OF CARDIAC PACEMAKER IN SITU: ICD-10-CM

## 2021-06-08 DIAGNOSIS — I48.11 LONGSTANDING PERSISTENT ATRIAL FIBRILLATION (HCC): ICD-10-CM

## 2021-06-08 DIAGNOSIS — E78.00 PURE HYPERCHOLESTEROLEMIA: ICD-10-CM

## 2021-06-08 DIAGNOSIS — R06.02 SOB (SHORTNESS OF BREATH) ON EXERTION: ICD-10-CM

## 2021-06-08 DIAGNOSIS — I47.29 NSVT (NONSUSTAINED VENTRICULAR TACHYCARDIA): ICD-10-CM

## 2021-06-08 DIAGNOSIS — I10 ESSENTIAL HYPERTENSION: ICD-10-CM

## 2021-06-08 PROCEDURE — 99214 OFFICE O/P EST MOD 30 MIN: CPT | Performed by: INTERNAL MEDICINE

## 2021-06-08 PROCEDURE — 93279 PRGRMG DEV EVAL PM/LDLS PM: CPT | Performed by: INTERNAL MEDICINE

## 2021-06-08 NOTE — PROGRESS NOTES
Chief Complaint   Patient presents with    6 Month Follow-Up    Congestive Heart Failure    Check-Up   Originally Patient her to establish cardiologist. He was going to have umbilical hernia repair with Dr. Maria Eugenia Caal yesterday but it was canceled due to new onset A-fib on EKG. Was admitted at [place] for chf ex and RT pleural effusion and had thoracenthesis over 2 liter and doubled lasix and sent home march 2019      Pt here for a 6 month f/u    NO wt gain    EKG done 11-17-20 scanned under media tab      Leg edema better with compression stocking      Sob on exertion- chronic       Denies cp, palpitations,and peripheral edema. Sob ON EXERTION- AT BASELINE- chronic    Had  Pacer in oCt 31 2013    Could not tolerate the amiodarone( shaky, destressed and constipated ) and stopped- all resolved and feel good    He is back to AFIb few days after Cardioversion    Pacer site looks good    Patient Seen, Chart, Consults notes, Labs, Radiology studies reviewed.        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    Impotence    Gastritis    Gastroesophageal reflux disease    Umbilical hernia    Daytime somnolence    Restless legs    Chronotropic incompetence    Dizziness- intermittent for the last few months new    AV block, [place] 1    History of cardiac pacemaker in situ    Chronic diastolic heart failure (HCC)    Diastolic CHF, chronic (HCC)    Obstructive sleep apnea syndrome    Vitamin B1 deficiency    Essential hypertension    Pure hypercholesterolemia    Abdominal aortic aneurysm (HCC)    SOB (shortness of breath) on exertion    NSVT (nonsustained ventricular tachycardia) ([address] on B-B and lab okay    Longstanding persistent atrial fibrillation [facility])       Past Surgical History:   Procedure Laterality Date    ANKLE ARTHROCENTESIS  2001    right    APPENDECTOMY  1952    BREAST SURGERY  1988    lumpectomy left    CARDIOVASCULAR STRESS TEST  13    CARDIOVASCULAR STRESS TEST  13    HOLTER     COLONOSCOPY  2009    COLONOSCOPY Left 10/22/2019    COLONOSCOPY DIAGNOSTIC performed by Jack Hines MD [address]    right inguinal    HERNIA REPAIR  14    Dr. Gisella Lewis hernia repair with mesh    MALIGNANT SKIN LESION EXCISION      left side of neck    PACEMAKER INSERTION  10/31/13    PACEMAKER PLACEMENT  10/2013    SKIN BIOPSY  10/9/14    Dr Raoul Monreal SKIN BIOPSY  4/16/15    Dr Fadi Alegria  2015    SKIN CANCER EXCISION  2019    THORACENTESIS  03/10/2019    TONSILLECTOMY  1950    TRANSTHORACIC ECHOCARDIOGRAM  13    UPPER GASTROINTESTINAL ENDOSCOPY Left 10/22/2019    EGD ESOPHAGOGASTRODUODENOSCOPY performed by Jack Hines MD at [address] [facility]    VARICOCELECTOMY  1988    right       Allergies   Allergen Reactions    Amiodarone      DEPRESSION, CONSTIPATION    Lamisil [Terbinafine] Itching    Morphine Other (See Comments)     hallucinations    Nickel Hives    Adhesive Tape Rash        Family History   Problem Relation Age of Onset    Dementia Mother     Cancer Mother         breast        Social History     Socioeconomic History    Marital status:      Spouse name: Not on file    Number of children: Not on file    Years of education: Not on file    Highest education level: Not on file   Occupational History    Occupation: retired   Tobacco Use    Smoking status: Former Smoker     Packs/day: 1.00     Years: 20.00     Pack years: 20.00     Types: Cigarettes     Quit date: 1986     Years since quittin.4    Smokeless tobacco: Former User     Types: Chew   Vaping Use    Vaping Use: Never used   Substance and Sexual Activity    Alcohol use:  Yes     Alcohol/week: 0.0 standard drinks     Comment: USALLY 1 DRINK DAILY    Drug use: No    Sexual activity: Yes     Partners: Female   Other Topics Concern    Not on file   Social History Narrative    Not on file     Social Determinants of Health     Financial Resource Strain: Low Risk     Difficulty of Paying Living Expenses: Not hard at all   Food Insecurity: No Food Insecurity    Worried About Running Out of Food in the Last Year: Never true    [address] in the Last Year: Never true   Transportation Needs: No Transportation Needs    Lack of Transportation (Medical): No    Lack of Transportation (Non-Medical):  No   Physical Activity:     Days of Exercise per Week:     Minutes of Exercise per Session:    Stress:     Feeling of Stress :    Social Connections:     Frequency of Communication with Friends and Family:     Frequency of Social Gatherings with Friends and Family:     Attends Jewish Services:     Active Member of Clubs or Organizations:     Attends Club or Organization Meetings:     Marital Status:    Intimate Partner Violence:     Fear of Current or Ex-Partner:     Emotionally Abused:     Physically Abused:     Sexually Abused:        Current Outpatient Medications   Medication Sig Dispense Refill    apixaban (ELIQUIS) 5 MG TABS tablet TAKE 1 TABLET TWICE A  tablet 0    metoprolol tartrate (LOPRESSOR) 50 MG tablet TAKE ONE AND ONE-HALF TABLETS TWICE A  tablet 0    amLODIPine (NORVASC) 10 MG tablet Take 1 tablet by mouth daily 90 tablet 3    simvastatin (ZOCOR) 10 MG tablet Take 1 tablet by mouth nightly 90 tablet 3    potassium chloride (KLOR-CON M) 10 MEQ extended release tablet Take 1 tablet by mouth daily 180 tablet 3    furosemide (LASIX) 40 MG tablet Take 1 tablet by mouth daily TAKE 1 TABLET DAILY 90 tablet 3    doxepin (SINEQUAN) 25 MG capsule Take 1 or 2 tabs at night for insomnia 180 capsule 3    irbesartan (AVAPRO) 300 MG tablet TAKE 1 TABLET DAILY 90 tablet 3    Apoaequorin (PREVAGEN EXTRA STRENGTH) 20 MG CAPS Take 1 capsule by mouth daily      amoxicillin (AMOXIL) 500 MG capsule Take 500 mg by mouth 3 times daily      ciclopirox (LOPROX) 0.77 % cream Apply topically daily as needed Apply topically 2 times daily.  aspirin EC 81 MG EC tablet Take 1 tablet by mouth daily. 30 tablet 3    econazole nitrate 1 % cream Apply  topically 2 times daily. Apply topically daily.  Calcium-Magnesium-Vitamin D (CITRACAL CALCIUM+D) 600- MG-MG-UNIT TB24 Take 1 tablet by mouth Daily. No current facility-administered medications for this visit. Review of Systems -     General ROS: negative  Psychological ROS: negative  Hematological and Lymphatic ROS: No history of blood clots or bleeding disorder. Respiratory ROS: no cough, shortness of breath, or wheezing  Cardiovascular ROS: no chest pain or dyspnea on exertion  Gastrointestinal ROS: negative  Genito-Urinary ROS: no dysuria, trouble voiding, or hematuria  Musculoskeletal ROS: negative  Neurological ROS: no TIA or stroke symptoms  Dermatological ROS: negative      Blood pressure 132/79, pulse 59, height 5' 10\" (1.778 m), weight 241 lb 12.8 oz (109.7 kg). Physical Examination:    General appearance - alert, well appearing, and in no distress  Mental status - alert, oriented to person, place, and time  Neck - supple, no significant adenopathy, no JVD, or carotid bruits  Chest - clear to auscultation, no wheezes, rales or rhonchi, symmetric air entry  Heart - normal rate, regular rhythm, normal S1, S2, no murmurs, rubs, clicks or gallops  Abdomen - soft, nontender, nondistended, no masses or organomegaly  Neurological - alert, oriented, normal speech, no focal findings or movement disorder noted  Musculoskeletal - no joint tenderness, deformity or swelling  Extremities - peripheral pulses normal, no pedal edema, no clubbing or cyanosis  Skin - normal coloration and turgor, no rashes, no suspicious skin lesions noted    Lab  No results for input(s): CKTOTAL, CKMB, CKMBINDEX, TROPONINI in the last 72 hours.   CBC:   Lab Results Component Value Date    WBC 5.6 04/09/2016    WBC 7.8 12/17/2013    RBC 4.63 04/09/2016    HGB 14.6 04/09/2016    HCT 43.6 04/09/2016    MCV 94.1 04/09/2016    MCH 31.5 04/09/2016    MCHC 33.5 04/09/2016    RDW 14.1 04/09/2016     04/09/2016     12/17/2013    MPV 9.3 11/04/2013     BMP:    Lab Results   Component Value Date     06/26/2019    K 4.7 06/26/2019     06/26/2019    CO2 30 06/26/2019    BUN 18 06/26/2019    LABALBU 4.2 07/07/2020    CREATININE 1.00 06/26/2019    CALCIUM 9.7 06/26/2019    LABGLOM 66 06/03/2014    LABGLOM 74 11/04/2013    GLUCOSE 97 06/26/2019     Hepatic Function Panel:    Lab Results   Component Value Date    ALKPHOS 81 07/07/2020    ALKPHOS 83 06/03/2014     Magnesium:    Lab Results   Component Value Date    MG 2.1 06/26/2019     Warfarin PT/INR:    No components found for: PTPATWAR,  PTINRWAR  HgBA1c:    No results found for: LABA1C  FLP:    No components found for: CHLPL,  TRIG,  HDL,  LDLCALC,  LDLDIRECT,  LABVLDL  TSH:    Lab Results   Component Value Date    TSH 1.600 04/09/2016       EKG 6/25/13  Atrial fibrillation   -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 ABNORMAL   holter CONCLUSION:   1. The predominant rhythm appears to be atrial fibrillation with   average rate of 50, maximum is 95, minimal of 26.   2. The patient has 2.2 second and 2.8 second pauses. This pattern   does seem to favor sick sinus syndrome. May need serial   monitoring if clinically indicated. 3. The patient appears to be in atrial fibrillation throughout the   recording. Anticoagulation may be recommended if no   contraindication and if clinically indicated. 4. There are some infrequent premature ventricular contractions. Couplets also noted on the study. 5. This is an abnormal Holter. Clinical correlation and follow up if   clinically indicated suggested.    6. One may want to consider serial follow up on this patient because   of the possible sick sinus syndrome features of the Holter monitor. Holter after CPAP repeated - NO changes actually had 3.8 sec pause- got worse  CONCLUSION: This is an abnormal Holter monitor finding with 100% atrial   fibrillation, average heart rate 46 beats per minute ranging from 25 to 89   beats per minute. There is a pause of 3.8 seconds at 5 a.m. in the morning. Probably the patient was asleep at that time. Minimum heart rate of 25 beats   per minute was also at 5 a.m. in the morning. It is abnormal for frequent ventricular ectopic beats. At this time, the patient is completely asymptomatic at least from the diary. RECOMMENDATION: Needs further clinical correlation. Consider evaluation of   this patient. If the patient needs to be on any beta-blocker, any calcium   channel blocker, or any rate reducing medication, the patient may need a   pacemaker. If the patient has any significant symptoms or any symptomatology, suggestive   consideration should be given for pacemaker placement. The patient has significant bradyarrhythmia. The baseline is bradycardic and   also he has a significant cause of 3.8 seconds. However if the patient is   asymptomatic and in view of the background atrial fibrillation, we will   continue with observation and close follow up. [place] M.D.     EKG: Atrial fibrillation-slow ventricular response rate of 36 BPM-Nonspecific QRS widening and anterior fascicular block.    -Anteroseptal infarct -age undetermined. ABNORMAL     Sleep study _moderate LOVE obstructive AHI of 19    EKG:-V paced rhythm     EKH 9/30/14-Electronic ventricular pacemaker underlying afib    EKG afib with V pacing      Small to moderate right-sided pleural effusion is demonstrated along with right basilar consolidative airspace disease which may represent atelectasis or pneumonia.  However, recommend short-term follow-up to document resolution.             This report has been created using voice recognition software.  It may contain minor errors which are inherent in voice recognition technology.       Final report electronically signed by Dr. Kp Berrios on 4/8/2019 11:52 AM         Persistence of a small to moderate right-sided pleural effusion with right basilar opacities which may represent atelectasis or infiltrate. Oral stable appearance of the chest when compared to the previous exam.           This report has been created using voice recognition software. It may contain minor errors which are inherent in voice recognition technology.       Final report electronically signed by Dr Bhupinder Canada on 6/4/2019 1:05 PM     There is an infrarenal abdominal aortic aneurysm measuring 3.6 x 3.3 cm. Previously this measured 3.2 x 3.1 cm on Friday, 5/10/2018.       2. Mild bilateral common iliac artery ectasia is noted. These measure 1.6 cm in diameter bilaterally.               This report has been created using voice recognition software.  It may contain minor errors which are inherent in voice recognition technology.       Final report electronically signed by Dr. Kp Berrios on 5/19/2021 4:50 PM             EKG 9/30/19  Electronic ventricular pacemaker   Pacemaker ECG, No further analysis   INSUFFICIENT DATA      Echo  Conclusions      Summary   Left ventricle size is normal.   Mildly increased left ventricle wall thickness. Systolic function was normal.   There were no regional wall motion abnormalities. Ejection fraction is visually estimated at 55%. The left atrium is Severely dilated. Mild-to-moderate mitral stenosis. The peak mitral valve velocity was 2.2 m/s, peak gradient was 18 mmHg, and   the mean gradient was 6 mmHg. Right ventricular systolic pressure measures 55 mmhg.       Signature      ----------------------------------------------------------------   Electronically signed by Lowell Thakkar MD (Interpreting   physician) on 06/15/2020 at hospitalization for CHF  Cont  Lasix 40 mg po qd  CXR-4/8/19  Weight measurment   Call if wt gain > 5 lb in 1 weeks  Lab from April 2021 WNL      Hypertension, on medical treatment. Seems to be under good control. Patient is compliant with medical treatment. HTN need better control  Cont  avapro  300 mg po qd  Cont  Norvasc 10 po qd  Pacer interrogation  April 2021  KNOWN AFIB /ELIQUIS   NSVT longest 11 sec composed 11 beat at rate 177 bpm    [employer] [address] DUAL PACEMAKER REMOTE   BATTERY 1.5 YRS REMAINING  ATRIAL IMPEDENCE 614  VENT IMPEDENCE 491  P WAVES AND RV WAVES NOT MEASURED PER THE DEVICE   NO ATRIAL THRESHOLD OBTAINED PER THE DEVICE     VENT THRESHOLD PER THE DEVICE 1.5 2 0.4    A PACED 100%  V PACED 100%  VVIR     EPISODES OF AFIB WITH RVR VERSES NS VT   LONGEST 12 SECONDS       Hyperlipidemia: on statins, followed periodically. Patient need periodic lipid and liver profile. Persistent atr fib  Cont  Asa 81 mg po QD  atr fib  Cont-apixaban 5 mg po BID    Discussed the risk and benefit of OA and he want to cont with meds    Off  amiodarone for pat intolerance- pat did well and feel much better    Complete lab prior to next visit    CXR RT Pleural Effusion unchanged  F/u CXR down the line    Stable and sob getting better    Lipid panel and liver function test before next appointment    Discussed use, benefit, and side effects of prescribed medications. All patient questions answered. Pt voiced understanding. Instructed to continue current medications, diet and exercise. Continue risk factor modification and medical management. Patient agreed with treatment plan. Follow up as directed.       RTC in 6 months      [facility]

## 2021-06-08 NOTE — PROGRESS NOTES
DR David Olivares PT Jenny Saravia AFIB/ ELIQUIS   VVIR   PRESENTS IN    PT IS DEPENDENT IN THE VENTRICLES   V PACED 100%    PT IS HERE TO SEE DR MCKEON TODAY    BATTERY 1 YR REMAINING  ATRIAL IMPEDENCE 644    VENT IMPEDENCE 513  RV WAVES PACED   VENT THRESHOLD 1.5 @ 0.4  VENT AMPLITUDE AUTO    1 NS VT EPISODE IN JANUARY OF THIS YEAR FOR 7 BTS     COUNTERS WERE RESET TODAY .  TOLD THEM  [facility] A DOWNLOAD

## 2021-06-21 RX ORDER — METOPROLOL TARTRATE 50 MG/1
TABLET, FILM COATED ORAL
Qty: 270 TABLET | Refills: 3 | Status: SHIPPED | OUTPATIENT
Start: 2021-06-21 | End: 2022-06-15

## 2021-07-19 ENCOUNTER — PROCEDURE VISIT (OUTPATIENT)
Dept: CARDIOLOGY CLINIC | Age: 81
End: 2021-07-19
Payer: MEDICARE

## 2021-07-19 DIAGNOSIS — Z95.0 S/P CARDIAC PACEMAKER PROCEDURE: Primary | ICD-10-CM

## 2021-07-19 PROCEDURE — 93294 REM INTERROG EVL PM/LDLS PM: CPT | Performed by: INTERNAL MEDICINE

## 2021-07-19 PROCEDURE — 93296 REM INTERROG EVL PM/IDS: CPT | Performed by: INTERNAL MEDICINE

## 2021-07-19 NOTE — PROGRESS NOTES
DR MCKEON PT   [employer] DUAL PACEMAKER REMOTE   BATTERY 1 YR REMAINING    ATRIAL IMPEDENCE 640  VENT IMPEDENCE 494  NO SENSING OBTAINED PER THE DEVICE   VVIR   V PACED 100%

## 2021-07-30 ENCOUNTER — OFFICE VISIT (OUTPATIENT)
Dept: FAMILY MEDICINE CLINIC | Age: 81
End: 2021-07-30

## 2021-07-30 VITALS
TEMPERATURE: 95.6 F | HEIGHT: 70 IN | DIASTOLIC BLOOD PRESSURE: 60 MMHG | SYSTOLIC BLOOD PRESSURE: 122 MMHG | BODY MASS INDEX: 34.68 KG/M2 | HEART RATE: 68 BPM | WEIGHT: 242.25 LBS | RESPIRATION RATE: 16 BRPM

## 2021-07-30 DIAGNOSIS — I10 ESSENTIAL HYPERTENSION: ICD-10-CM

## 2021-07-30 DIAGNOSIS — I71.40 ABDOMINAL AORTIC ANEURYSM (AAA) WITHOUT RUPTURE: Primary | ICD-10-CM

## 2021-07-30 DIAGNOSIS — Z00.00 ROUTINE GENERAL MEDICAL EXAMINATION AT A HEALTH CARE FACILITY: ICD-10-CM

## 2021-07-30 PROCEDURE — G0439 PPPS, SUBSEQ VISIT: HCPCS | Performed by: FAMILY MEDICINE

## 2021-07-30 PROCEDURE — 99213 OFFICE O/P EST LOW 20 MIN: CPT | Performed by: FAMILY MEDICINE

## 2021-07-30 ASSESSMENT — PATIENT HEALTH QUESTIONNAIRE - PHQ9
SUM OF ALL RESPONSES TO PHQ9 QUESTIONS 1 & 2: 2
SUM OF ALL RESPONSES TO PHQ QUESTIONS 1-9: 2
SUM OF ALL RESPONSES TO PHQ QUESTIONS 1-9: 2
1. LITTLE INTEREST OR PLEASURE IN DOING THINGS: 1
SUM OF ALL RESPONSES TO PHQ QUESTIONS 1-9: 2
2. FEELING DOWN, DEPRESSED OR HOPELESS: 1

## 2021-07-30 ASSESSMENT — LIFESTYLE VARIABLES
HOW MANY STANDARD DRINKS CONTAINING ALCOHOL DO YOU HAVE ON A TYPICAL DAY: 0
HOW OFTEN DURING THE LAST YEAR HAVE YOU FOUND THAT YOU WERE NOT ABLE TO STOP DRINKING ONCE YOU HAD STARTED: 0
HAVE YOU OR SOMEONE ELSE BEEN INJURED AS A RESULT OF YOUR DRINKING: 0
HOW OFTEN DURING THE LAST YEAR HAVE YOU NEEDED AN ALCOHOLIC DRINK FIRST THING IN THE MORNING TO GET YOURSELF GOING AFTER A NIGHT OF HEAVY DRINKING: 0
HOW OFTEN DO YOU HAVE A DRINK CONTAINING ALCOHOL: 4
HOW OFTEN DURING THE LAST YEAR HAVE YOU HAD A FEELING OF GUILT OR REMORSE AFTER DRINKING: 0
AUDIT-C TOTAL SCORE: 4
HOW OFTEN DURING THE LAST YEAR HAVE YOU BEEN UNABLE TO REMEMBER WHAT HAPPENED THE NIGHT BEFORE BECAUSE YOU HAD BEEN DRINKING: 0
AUDIT TOTAL SCORE: 4
HOW OFTEN DO YOU HAVE SIX OR MORE DRINKS ON ONE OCCASION: 0
HOW OFTEN DURING THE LAST YEAR HAVE YOU FAILED TO DO WHAT WAS NORMALLY EXPECTED FROM YOU BECAUSE OF DRINKING: 0
HAS A RELATIVE, FRIEND, DOCTOR, OR ANOTHER HEALTH PROFESSIONAL EXPRESSED CONCERN ABOUT YOUR DRINKING OR SUGGESTED YOU CUT DOWN: 0

## 2021-07-30 NOTE — PROGRESS NOTES
Medicare Annual Wellness Visit  Name: Pepper Caromna Date: 2021   MRN: [id number] Sex: Male   Age: 68 y.o. Ethnicity: Non- / Non    : 1940 Race: White (non-)      Tony Joya is here for Medicare AWV    Screenings for behavioral, psychosocial and functional/safety risks, and cognitive dysfunction are all negative except as indicated below. These results, as well as other patient data from the [address] form, are documented in Flowsheets linked to this Encounter. Allergies   Allergen Reactions    Amiodarone      DEPRESSION, CONSTIPATION    Lamisil [Terbinafine] Itching    Morphine Other (See Comments)     hallucinations    Nickel Hives    Adhesive Tape Rash       Prior to Visit Medications    Medication Sig Taking? Authorizing Provider   apixaban (ELIQUIS) 5 MG TABS tablet TAKE 1 TABLET TWICE A DAY Yes JULIET Gonzalez - CNP   metoprolol tartrate (LOPRESSOR) 50 MG tablet TAKE ONE AND ONE-HALF TABLETS TWICE A DAY Yes Aline Farah MD   amLODIPine (NORVASC) 10 MG tablet Take 1 tablet by mouth daily Yes Aline Farah MD   simvastatin (ZOCOR) 10 MG tablet Take 1 tablet by mouth nightly Yes Aline Farah MD   potassium chloride (KLOR-CON M) 10 MEQ extended release tablet Take 1 tablet by mouth daily Yes Aline Farah MD   furosemide (LASIX) 40 MG tablet Take 1 tablet by mouth daily TAKE 1 TABLET DAILY Yes Aline Farah MD   doxepin (SINEQUAN) 25 MG capsule Take 1 or 2 tabs at night for insomnia Yes Maribel Cm PA-C   irbesartan (AVAPRO) 300 MG tablet TAKE 1 TABLET DAILY Yes Aline Farah MD   Apoaequorin (PREVAGEN EXTRA STRENGTH) 20 MG CAPS Take 1 capsule by mouth daily Yes Trista Pemberton MD   amoxicillin (AMOXIL) 500 MG capsule Take 500 mg by mouth 3 times daily Yes Historical Provider, MD   ciclopirox (LOPROX) 0.77 % cream Apply topically daily as needed Apply topically 2 times daily.  Yes Historical Provider, MD   aspirin EC 81 MG EC tablet Take 1 tablet by mouth daily. Yes Doug Lara MD   econazole nitrate 1 % cream Apply  topically 2 times daily. Apply topically daily. Yes Historical Provider, MD   Calcium-Magnesium-Vitamin D (CITRACAL CALCIUM+D) 600- MG-MG-UNIT TB24 Take 1 tablet by mouth Daily. Yes Ilsa Ferreira MD       Past Medical History:   Diagnosis Date    AAA (abdominal aortic aneurysm) ([facility] 05/2018    Adenomatous colon polyp 2012    Alzheimer disease ([facility]     early stage    Antral gastritis 7/2012    Arthritis     Atrial fibrillation ([facility] 2013    AV block, Mobitz 1 10/31/2013    Blood transfusion reaction     Cancer ([facility] 2010    SKIN CANCER NECK    [facility]     GERD with stricture 7/2012    Hyperlipidemia 2008    Hypertension 2005    Nausea & vomiting     LOVE on CPAP     Rheumatic fever 1950's    S/P cardiac pacemaker procedure: 10/31/2013: [address] Dual chamber. 10/31/2013    10/31/2013: [address]. Dual chamber.  Dr. Sanjeev Harry Umbilical hernia 0899    Vitamin B1 deficiency 4/2016       Past Surgical History:   Procedure Laterality Date    ANKLE ARTHROCENTESIS  2001    right    APPENDECTOMY  1952    BREAST SURGERY  1988    lumpectomy left    CARDIOVASCULAR STRESS TEST  6-27-13    CARDIOVASCULAR STRESS TEST  6-26-13    HOLTER     COLONOSCOPY  2009    COLONOSCOPY Left 10/22/2019    COLONOSCOPY DIAGNOSTIC performed by Lashawn Clayton MD [address]    right inguinal    HERNIA REPAIR  1-27-14    Dr. Ibis Nesbitt hernia repair with mesh    MALIGNANT SKIN LESION EXCISION  2011    left side of neck    PACEMAKER INSERTION  10/31/13    PACEMAKER PLACEMENT  10/2013    SKIN BIOPSY  10/9/14    Dr Rosi Matthew SKIN BIOPSY  4/16/15    Dr Riccardo Sharma   [facility] SKIN CANCER DESTRUCTION  5-    SKIN CANCER EXCISION  11/13/2019    THORACENTESIS  03/10/2019    TONSILLECTOMY  1948    TRANSTHORACIC ECHOCARDIOGRAM  6-28-13 rhonchi, normal air movement, no respiratory distress  Cardiovascular: normal rate, regular rhythm, normal S1 and S2, no murmurs, no gallops and no carotid bruits  Abdomen: soft, non-tender, non-distended, normal bowel sounds, no masses or organomegaly  Extremities: no cyanosis and no clubbing  Musculoskeletal: normal range of motion, no joint swelling, deformity or tenderness and the left shoulder has some slow ROM  Neurologic: gait and coordination normal and speech normal    Patient's complete Health Risk Assessment and screening values have been reviewed and are found in Flowsheets. The following problems were reviewed today and where indicated follow up appointments were made and/or referrals ordered. Positive Risk Factor Screenings with Interventions:          General Health and ACP:  General  In general, how would you say your health is?: Fair  In the past 7 days, have you experienced any of the following?  New or Increased Pain, New or Increased Fatigue, Loneliness, Social Isolation, Stress or Anger?: (!) New or Increased Pain  Do you get the social and emotional support that you need?: Yes  Do you have a Living Will?: Yes  Advance Directives     Power of  Living Will ACP-Advance Directive ACP-Power of     Not on File [place] on 05/13/15 [id number] [facility] Risk Interventions:  · the shoulder as above    Health Habits/Nutrition:  Health Habits/Nutrition  Do you exercise for at least 20 minutes 2-3 times per week?: Yes  Have you lost any weight without trying in the past 3 months?: No  Do you eat only one meal per day?: (!) Yes  Have you seen the dentist within the past year?: Yes  Body mass index: (!) 34.76  Health Habits/Nutrition Interventions:  · one main meal and then some snacks   · We reviewed the weight increase from last year    Hearing/Vision:  No exam data present  Hearing/Vision  Do you or your family notice any trouble with your hearing that hasn't been managed with hearing aids?: No  Do you have difficulty driving, watching TV, or doing any of your daily activities because of your eyesight?: (!) Yes  Have you had an eye exam within the past year?: Yes  Hearing/Vision Interventions:  · Vision concerns:  he has floaters and we discussed the need to see the opthlamologist      Personalized Preventive Plan   Current Health Maintenance Status  Immunization History   Administered Date(s) Administered    FLUZONE 3 YEARS AND OVER 09/25/2013    Influenza, Quadv, IM, (6 mo and older Fluzone, Flulaval, Fluarix and 3 yrs and older Afluria) 09/25/2019    Influenza, Quadv, IM, PF (6 mo and older Fluzone, Flulaval, Fluarix, and 3 yrs and older Afluria) 09/27/2017, 09/23/2020    Influenza, Quadv, adjuvanted, 65 yrs +, IM, PF (Fluad) 09/23/2020    Influenza, Triv, inactivated, subunit, adjuvanted, IM (Fluad 65 yrs and older) 10/03/2018, 09/23/2020    Pneumococcal Conjugate 13-valent ([id number]) 09/27/2017    Pneumococcal Polysaccharide ([id number]) 10/03/2018    Zoster Live (Zostavax) 09/20/2017    Zoster Recombinant (Shingrix) 05/01/2019, 08/29/2019        Health Maintenance   Topic Date Due    Hepatitis C screen  Never done    COVID-19 Vaccine (1) Never done    DTaP/Tdap/Td vaccine (1 - Tdap) Never done   [employer] Visit (AWV)  Never done    Lipid screen  07/07/2021    Flu vaccine (1) 09/01/2021    Potassium monitoring  04/21/2022    Creatinine monitoring  04/21/2022    Shingles Vaccine  Completed    Pneumococcal 65+ years Vaccine  Completed    Hepatitis A vaccine  Aged Out    Hepatitis B vaccine  Aged Out    Hib vaccine  Aged Out    Meningococcal (ACWY) vaccine  Aged Out     Recommendations for [employer] Due: see orders and patient instructions/AVS.  . Recommended screening schedule for the next 5-10 years is provided to the patient in written form: see Patient Instructions/AVS.    There are no diagnoses linked to this encounter.

## 2021-07-30 NOTE — PATIENT INSTRUCTIONS
Personalized Preventive Plan for Erasmo Andre - 7/30/2021  Medicare offers a range of preventive health benefits. Some of the tests and screenings are paid in full while other may be subject to a deductible, co-insurance, and/or copay. Some of these benefits include a comprehensive review of your medical history including lifestyle, illnesses that may run in your family, and various assessments and screenings as appropriate. After reviewing your medical record and screening and assessments performed today your provider may have ordered immunizations, labs, imaging, and/or referrals for you. A list of these orders (if applicable) as well as your Preventive Care list are included within your After Visit Summary for your review. Other Preventive Recommendations:    · A preventive eye exam performed by an eye specialist is recommended every 1-2 years to screen for glaucoma; cataracts, macular degeneration, and other eye disorders. · A preventive dental visit is recommended every 6 months. · Try to get at least 150 minutes of exercise per week or 10,000 steps per day on a pedometer . · Order or download the FREE \"Exercise & Physical Activity: Your Everyday Guide\" from [employer]. Call 3-225.346.4172 or search [employer] online. · You need 5189-1810 mg of calcium and 5221-6428 IU of vitamin D per day. It is possible to meet your calcium requirement with diet alone, but a vitamin D supplement is usually necessary to meet this goal.  · When exposed to the sun, use a sunscreen that protects against both UVA and UVB radiation with an SPF of 30 or greater. Reapply every 2 to 3 hours or after sweating, drying off with a towel, or swimming. · Always wear a seat belt when traveling in a car. Always wear a helmet when riding a bicycle or motorcycle.

## 2021-09-07 RX ORDER — IRBESARTAN 300 MG/1
TABLET ORAL
Qty: 90 TABLET | Refills: 3 | Status: SHIPPED | OUTPATIENT
Start: 2021-09-07 | End: 2022-07-05 | Stop reason: ALTCHOICE

## 2021-09-13 ENCOUNTER — TELEPHONE (OUTPATIENT)
Dept: FAMILY MEDICINE CLINIC | Age: 81
End: 2021-09-13

## 2021-10-22 ENCOUNTER — PROCEDURE VISIT (OUTPATIENT)
Dept: CARDIOLOGY CLINIC | Age: 81
End: 2021-10-22
Payer: MEDICARE

## 2021-10-22 DIAGNOSIS — Z95.0 S/P CARDIAC PACEMAKER PROCEDURE: Primary | ICD-10-CM

## 2021-10-22 PROCEDURE — 93294 REM INTERROG EVL PM/LDLS PM: CPT | Performed by: INTERNAL MEDICINE

## 2021-10-22 PROCEDURE — 93296 REM INTERROG EVL PM/IDS: CPT | Performed by: INTERNAL MEDICINE

## 2021-10-22 NOTE — PROGRESS NOTES
DR MCKEON PT   NXT [employer]NI Video DUAL PACEMAKER REMOTE  BATTERY 9 MONTHS REMAINIING  PT WILL BE ON BATTERY WATCH    ATRIAL IMPEDENCE 645  VENT IMPEDENCE 499  VVIR   VENT THESHOLD 1.6 @ 0.4  VENT AMPLITUDE AUTO 2.2 @ 0.4  V PACED 100%  8/16/21 4 BTS NS VT

## 2021-11-22 RX ORDER — DOXEPIN HYDROCHLORIDE 25 MG/1
CAPSULE ORAL
Qty: 180 CAPSULE | Refills: 3 | Status: SHIPPED | OUTPATIENT
Start: 2021-11-22

## 2021-11-23 NOTE — TELEPHONE ENCOUNTER
Okay to proceed  May hold apixaban for 3 days
Patient notified and voiced understanding. Form out for Signature.
Pre op Risk Assessment    Procedure Skin Cancer Excision on Right Hand  Physician [facility])  Date of surgery/procedure TBD    Last OV 6-8-20  Last Stress 4-25-19  Last Echo 6-15-20  Last Cath 11-25-13  Last Stent None in Epic  Is patient on blood thinners Eliquis  Hold Meds/how many days 3 days    Patient currently living in [place] until returning back in June for [address]      Return fax: [phone]
23-Nov-2021 20:18

## 2021-11-26 ENCOUNTER — TELEPHONE (OUTPATIENT)
Dept: CARDIOLOGY CLINIC | Age: 81
End: 2021-11-26

## 2021-11-26 NOTE — TELEPHONE ENCOUNTER
Neighbor has mailed boston monitor to [facility] in [facility] but [facility] has not yet received it. Asked to give [employer] another week.   If they have not received monitor by Dec 3, will reorder for them    Wife verbalized understanding

## 2021-11-26 NOTE — TELEPHONE ENCOUNTER
VM left by wife, Gerard ferreira sci latitude monitor has been lost by post office.   LM we can order a new one but to please call and let us know what address to send monitor to

## 2021-12-14 RX ORDER — AMLODIPINE BESYLATE 10 MG/1
TABLET ORAL
Qty: 90 TABLET | Refills: 3 | Status: SHIPPED | OUTPATIENT
Start: 2021-12-14

## 2021-12-23 ENCOUNTER — PROCEDURE VISIT (OUTPATIENT)
Dept: CARDIOLOGY CLINIC | Age: 81
End: 2021-12-23

## 2021-12-23 DIAGNOSIS — Z95.0 S/P CARDIAC PACEMAKER PROCEDURE: Primary | ICD-10-CM

## 2021-12-23 NOTE — PROGRESS NOTES
Dr John De La Cruz pt   NXT Vitrina DUAL PACEMAKER REMOTE   PT IS ON BATTERY WATCH  BATTERY 6 MONTHS REMAINING      ATRIAL IMPEDENCE 603  VENT IMPEDENCE 472    RV WAVES 9  VENT THRESHOLD 1.7 @ 0.4    VVIR     V PACED 100%    11/05/21 HIGH VENT RATE EPISODE FOR 10 SECONDS     NS VT VERSES AFIB WITH RVT    N/C LESS THAN 90 DAYS

## 2022-01-19 RX ORDER — SIMVASTATIN 10 MG
TABLET ORAL
Qty: 90 TABLET | Refills: 1 | Status: SHIPPED | OUTPATIENT
Start: 2022-01-19 | End: 2022-07-18

## 2022-02-11 RX ORDER — FUROSEMIDE 40 MG/1
TABLET ORAL
Qty: 90 TABLET | Refills: 1 | Status: SHIPPED | OUTPATIENT
Start: 2022-02-11 | End: 2022-08-10

## 2022-02-17 ENCOUNTER — PROCEDURE VISIT (OUTPATIENT)
Dept: CARDIOLOGY CLINIC | Age: 82
End: 2022-02-17

## 2022-02-17 DIAGNOSIS — Z95.0 HISTORY OF CARDIAC PACEMAKER IN SITU: Primary | ICD-10-CM

## 2022-02-17 NOTE — PROGRESS NOTES
[facility] dual pacer   Programmed VVIR     . Yazmin Javier Battery longevity:  4 months on device will watch with latitude   Presenting rhythm      Atrial impedance 652  RV impedance 515    P wave sensing not measured   R wave sensing 8.1    0 % atrial paced  99 % RV paced     Atrial threshold not measured   RV threshold 1.6 V at 0.4ms

## 2022-02-18 RX ORDER — POTASSIUM CHLORIDE 750 MG/1
TABLET, EXTENDED RELEASE ORAL
Qty: 180 TABLET | Refills: 1 | Status: SHIPPED | OUTPATIENT
Start: 2022-02-18

## 2022-04-27 ENCOUNTER — PROCEDURE VISIT (OUTPATIENT)
Dept: CARDIOLOGY CLINIC | Age: 82
End: 2022-04-27
Payer: MEDICARE

## 2022-04-27 DIAGNOSIS — Z95.0 HISTORY OF CARDIAC PACEMAKER IN SITU: Primary | ICD-10-CM

## 2022-04-27 PROCEDURE — 93296 REM INTERROG EVL PM/IDS: CPT | Performed by: INTERNAL MEDICINE

## 2022-04-27 PROCEDURE — 93294 REM INTERROG EVL PM/LDLS PM: CPT | Performed by: INTERNAL MEDICINE

## 2022-04-27 NOTE — PROGRESS NOTES
Remote Min Sci Dual Pacemaker --  Patient of Roula    Battery <3 months - monthly battery watch    Presenting rhythm     A Impedance 655  RV Impedance 523    P wave sensing -  R wave sensing -    A Threshold - @ -  RV Thresholds 1.7 @ 0.40      A Paced 0%  V Paced 99%    Programmed Mode VVIR       Afib [place] -%    Episodes   none

## 2022-05-02 ENCOUNTER — TELEPHONE (OUTPATIENT)
Dept: FAMILY MEDICINE CLINIC | Age: 82
End: 2022-05-02

## 2022-05-02 NOTE — TELEPHONE ENCOUNTER
Jaime Parrish informed by Phone. Patient states he got this done in [place] and will bring copies in when they get back into town. Cancelled us at [address].

## 2022-05-02 NOTE — TELEPHONE ENCOUNTER
Ultrasound scheduled for 5/13/2022 at 830am at [facility]. Patient needs to arrive at 745am go to [facility]. npo after midnight. Lowfat meal the night before. Gas medication is recommended prior to appt.

## 2022-05-12 ENCOUNTER — OFFICE VISIT (OUTPATIENT)
Dept: PULMONOLOGY | Age: 82
End: 2022-05-12
Payer: MEDICARE

## 2022-05-12 VITALS
DIASTOLIC BLOOD PRESSURE: 72 MMHG | HEART RATE: 60 BPM | OXYGEN SATURATION: 97 % | TEMPERATURE: 98.2 F | WEIGHT: 246 LBS | SYSTOLIC BLOOD PRESSURE: 122 MMHG | BODY MASS INDEX: 35.22 KG/M2 | HEIGHT: 70 IN

## 2022-05-12 DIAGNOSIS — G47.33 OBSTRUCTIVE SLEEP APNEA ON CPAP: Primary | ICD-10-CM

## 2022-05-12 DIAGNOSIS — Z99.89 OBSTRUCTIVE SLEEP APNEA ON CPAP: Primary | ICD-10-CM

## 2022-05-12 DIAGNOSIS — E66.01 SEVERE OBESITY (BMI 35.0-39.9) WITH COMORBIDITY (HCC): ICD-10-CM

## 2022-05-12 DIAGNOSIS — G47.09 OTHER INSOMNIA: ICD-10-CM

## 2022-05-12 PROCEDURE — 99213 OFFICE O/P EST LOW 20 MIN: CPT | Performed by: PHYSICIAN ASSISTANT

## 2022-05-12 ASSESSMENT — ENCOUNTER SYMPTOMS
BACK PAIN: 0
CHEST TIGHTNESS: 0
SHORTNESS OF BREATH: 0
NAUSEA: 0
WHEEZING: 0
DIARRHEA: 0
ALLERGIC/IMMUNOLOGIC NEGATIVE: 1
EYES NEGATIVE: 1
STRIDOR: 0
COUGH: 0

## 2022-05-12 NOTE — PROGRESS NOTES
[facility]      Ramona Lyons         [id number]  5/12/2022   Patient is here today for a one year follow up     Pt of Dr. Staci Siddiqi    PAP Download:   Original or initial AHI: 19.6     Date of initial study: 8/6/13      Compliant  100%     Noncompliant 0 %     PAP Type air sense Level  11   Avg Hrs/Day 10 hours and 59 min  AHI: 0.5   Recorded compliance dates , 4/10/22  to 5/9/22   Machine/Mfg:   [x] ResMed    [] Respironics/Dreamstation   Interface:   [x] Nasal    [] Nasal pillows   [] FFM      Provider:      [] [facility]     [][employer]     [] [employer]    [x] [facility]    [] [place]               [] [facility] [facility]      [] Adaptive    [] [address]      [] Other    Neck Size: 17.25 Mallampati Mallampati 3  ESS:  9  SAQLI: 74    Here is a scan of the most recent download:            Presentation:   Adin Jimenez presents for sleep medicine follow up for obstructive sleep apnea  Since the last visit, Adin Jimenez is doing well with PAP. He is sleeping well and mostly rested. Equipment issues: The pressure is  acceptable, the mask is acceptable     Sleep issues:  Do you feel better? Yes  More rested? Yes   Better concentration? yes    Progress History:   Since last visit any new medical issues? No  New ER or hospital visits? No  Any new or changes in medicines? No  Any new sleep medicines? No    Review of Systems -   Review of Systems   Constitutional: Negative for activity change, appetite change, chills and fever. HENT: Negative for congestion and postnasal drip. Eyes: Negative. Respiratory: Negative for cough, chest tightness, shortness of breath, wheezing and stridor. Cardiovascular: Negative for chest pain and leg swelling. Gastrointestinal: Negative for diarrhea and nausea. Endocrine: Negative. Genitourinary: Negative. Musculoskeletal: Negative. Negative for arthralgias and back pain. Skin: Negative. Allergic/Immunologic: Negative. Neurological: Negative. Negative for dizziness and light-headedness. Psychiatric/Behavioral: Negative. All other systems reviewed and are negative. Physical Exam:    BMI:  Body mass index is 35.3 kg/m². Wt Readings from Last 3 Encounters:   05/12/22 246 lb (111.6 kg)   07/30/21 242 lb 4 oz (109.9 kg)   06/08/21 241 lb 12.8 oz (109.7 kg)     Weight stable / unchanged  Vitals: /72 (Site: Left Upper Arm, Position: Sitting, Cuff Size: Large Adult)   Pulse 60   Temp 98.2 °F (36.8 °C)   Ht 5' 10\" (1.778 m)   Wt 246 lb (111.6 kg)   SpO2 97% Comment: on r/a  BMI 35.30 kg/m²       Physical Exam  Constitutional:       Appearance: Normal appearance. He is normal weight. HENT:      Head: Normocephalic and atraumatic. Right Ear: External ear normal.      Left Ear: External ear normal.      Nose: Nose normal.   Eyes:      Extraocular Movements: Extraocular movements intact. Conjunctiva/sclera: Conjunctivae normal.      Pupils: Pupils are equal, round, and reactive to light. Pulmonary:      Effort: Pulmonary effort is normal.   Musculoskeletal:      Cervical back: Normal range of motion and neck supple. Neurological:      General: No focal deficit present. Mental Status: He is alert and oriented to person, place, and time. Psychiatric:         Attention and Perception: Attention and perception normal.         Mood and Affect: Mood and affect normal.         Speech: Speech normal.         Behavior: Behavior normal. Behavior is cooperative. Thought Content: Thought content normal.         Cognition and Memory: Cognition normal.         Judgment: Judgment normal.           ASSESSMENT/DIAGNOSIS     Diagnosis Orders   1. Obstructive sleep apnea on CPAP     2. Severe obesity (BMI 35.0-39. 9) with comorbidity ([address]     3. Other insomnia              Plan   Do you need any equipment today?  Yes update supplies  - Download reviewed and discussed with patient  - He  was advised to continue current positive airway pressure therapy with above described pressure. - He  advised to keep good compliance with current recommended pressure to get optimal results and clinical improvement  - Recommend 7-9 hours of sleep with PAP  - He was advised to call [employer] regarding supplies if needed.   -He call my office for earlier appointment if needed for worsening of sleep symptoms.   - He was instructed on weight loss  - Alver Pouch was educated about my impression and plan. Patient verbalizesunderstanding.   We will see Andrade Stiven back in: 1 year with download    Information added by my medical assistant/LPN was reviewed today         Kale Palm PA-C, PHU  5/12/2022

## 2022-06-01 ENCOUNTER — PROCEDURE VISIT (OUTPATIENT)
Dept: CARDIOLOGY CLINIC | Age: 82
End: 2022-06-01

## 2022-06-01 DIAGNOSIS — Z95.0 HISTORY OF CARDIAC PACEMAKER IN SITU: Primary | ICD-10-CM

## 2022-06-06 ENCOUNTER — HOSPITAL ENCOUNTER (EMERGENCY)
Age: 82
Discharge: LEFT BEFORE DISCHARGE | End: 2022-06-06
Attending: EMERGENCY MEDICINE
Payer: MEDICARE

## 2022-06-06 ENCOUNTER — TELEPHONE (OUTPATIENT)
Dept: FAMILY MEDICINE CLINIC | Age: 82
End: 2022-06-06

## 2022-06-06 VITALS
HEIGHT: 70 IN | BODY MASS INDEX: 35.07 KG/M2 | WEIGHT: 245 LBS | RESPIRATION RATE: 32 BRPM | OXYGEN SATURATION: 94 % | SYSTOLIC BLOOD PRESSURE: 129 MMHG | HEART RATE: 60 BPM | DIASTOLIC BLOOD PRESSURE: 68 MMHG | TEMPERATURE: 97.4 F

## 2022-06-06 DIAGNOSIS — U07.1 COVID-19 VIRUS INFECTION: Primary | ICD-10-CM

## 2022-06-06 DIAGNOSIS — R06.02 SHORTNESS OF BREATH: ICD-10-CM

## 2022-06-06 LAB — SARS-COV-2, NAAT: DETECTED

## 2022-06-06 PROCEDURE — 99283 EMERGENCY DEPT VISIT LOW MDM: CPT

## 2022-06-06 PROCEDURE — 87635 SARS-COV-2 COVID-19 AMP PRB: CPT

## 2022-06-06 ASSESSMENT — ENCOUNTER SYMPTOMS
COUGH: 1
EYE PAIN: 0
SORE THROAT: 0
DIARRHEA: 0
WHEEZING: 0
SHORTNESS OF BREATH: 1
EYE DISCHARGE: 0
BLOOD IN STOOL: 0
VOMITING: 0
ABDOMINAL PAIN: 0

## 2022-06-06 ASSESSMENT — PAIN - FUNCTIONAL ASSESSMENT
PAIN_FUNCTIONAL_ASSESSMENT: NONE - DENIES PAIN
PAIN_FUNCTIONAL_ASSESSMENT: NONE - DENIES PAIN

## 2022-06-06 NOTE — ED NOTES
Pt. And spouse informed of positive covid result and need for oxygen. Pt. Declines @ home oxygen @ this time, spouse agreeable with pt's decision at this time. AVS rev'd with pt. And copy given. Pulse regular. Extremities warm. Respirations regular and quiet. Mucous membranes pink & moist. Alert and oriented times 3. No nausea or vomiting. Range of motion within patient's limits. Skin pink, warm and dry. Calm and cooperative.      Kathryn Courser, RN  06/06/22 [id number]

## 2022-06-06 NOTE — TELEPHONE ENCOUNTER
Called Jayde Deras back to get some more information on patient. She tested her self today and test was positive. Pt started with same symptoms(cough, runny nose, lethargic) as her yesterday (6/5/2022). Pt has not taken a covid test. She stated that if Dr. Lincoln Townsend would rx the antibodies they would like to use [facility]. If Dr. Lincoln Townsend would like to rx medication they would like to use [facility] on [address].

## 2022-06-06 NOTE — ED NOTES
Covid swab obtained, pt. Unsure @ this time if he wants take home oxygen at this time. Pt. And spouse discussing poc. Pt. And spouse informed we are waiting on covid test result prior to discharge, voices understanding. Pt. Skyler Mueller to wait for result at this time, call light  In reach. Pt.and spouse educated on isolation, voice understanding.       Britney Crandall, HAMZAH  06/06/22 [id number]

## 2022-06-06 NOTE — ED TRIAGE NOTES
Pt presents ambulatory to ED accompanied per spouse with c/o runny nose, cough, shortness of breath; home covid test positive. Pt.  To exam 2, dyspnea increased with walking, pulse ox=94%; increased to 99% RA with rest.

## 2022-06-06 NOTE — TELEPHONE ENCOUNTER
Adelaida Vargas called back stating Reanna Phillips took a covid test and it came back positive. Pt's wife doesn't want it but was going to talk to [facility] to see if he wanted the paxlovid    Please call Adelaida Vargas if any question's

## 2022-06-06 NOTE — ED PROVIDER NOTES
[address]  [address] [facility] [address]  Phone: [phone]    eMERGENCY dEPARTMENT eNCOUnter           [address] [facility]       Chief Complaint   Patient presents with    Positive For Covid-19    Cough    Shortness of Breath    Nasal Congestion       Nurses Notes reviewed and I agree except as noted in the HPI. HISTORY OF PRESENT ILLNESS    Saúl Ng is a 80 y.o. male who presented via private vehicle with above-mentioned complaints. He stated that he tested positive for COVID yesterday. He came here requesting monoclonal IV antibodies. He stated that he lives in [address] [place] [address]. He is complaining of mild productive cough and shortness of breath. He has history of sleep apnea he uses CPAP at night. He is not on home O2. He describes his shortness of breath as mild, worse with walking and exertion. He denies chest pain. He has subjective low-grade fever. REVIEW OF SYSTEMS     Review of Systems   Constitutional: Positive for fever. Negative for chills. HENT: Positive for congestion. Negative for sore throat. Eyes: Negative for pain and discharge. Respiratory: Positive for cough and shortness of breath. Negative for wheezing. Cardiovascular: Negative for chest pain and palpitations. Gastrointestinal: Negative for abdominal pain, blood in stool, diarrhea and vomiting. Genitourinary: Negative for dysuria and hematuria. Musculoskeletal: Negative for neck pain and neck stiffness. Neurological: Negative for seizures, syncope and headaches. Psychiatric/Behavioral: Negative for confusion.        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 , Umbilical hernia, and Vitamin B1 deficiency. SURGICAL HISTORY      has a past surgical history that includes Breast surgery (1988); Appendectomy (1952); Ankle Arthrocentesis (2001); Varicocelectomy (1988); malignant skin lesion excision (2011); Tonsillectomy (1950); Colonoscopy (2009); cardiovascular stress test (6-27-13); transthoracic echocardiogram (6-28-13); cardiovascular stress test (6-26-13); Pacemaker insertion (10/31/13); pacemaker placement (10/2013); hernia repair (1940); hernia repair (1-27-14); skin biopsy (10/9/14); skin biopsy (4/16/15); Skin cancer destruction (5-); thoracentesis (03/10/2019); Colonoscopy (Left, 10/22/2019); Upper gastrointestinal endoscopy (Left, 10/22/2019); and Skin cancer excision (11/13/2019). CURRENT MEDICATIONS       Previous Medications    AMLODIPINE (NORVASC) 10 MG TABLET    TAKE 1 TABLET DAILY    AMOXICILLIN (AMOXIL) 500 MG CAPSULE    Take 500 mg by mouth 3 times daily    APIXABAN (ELIQUIS) 5 MG TABS TABLET    TAKE 1 TABLET TWICE A DAY    APOAEQUORIN (PREVAGEN EXTRA STRENGTH) 20 MG CAPS    Take 1 capsule by mouth daily    ASPIRIN EC 81 MG EC TABLET    Take 1 tablet by mouth daily. CALCIUM-MAGNESIUM-VITAMIN D (CITRACAL CALCIUM+D) 600- MG-MG-UNIT TB24    Take 1 tablet by mouth Daily. CICLOPIROX (LOPROX) 0.77 % CREAM    Apply topically daily as needed Apply topically 2 times daily. DOXEPIN (SINEQUAN) 25 MG CAPSULE    TAKE 1 OR 2 CAPSULES AT NIGHT FOR INSOMNIA    ECONAZOLE NITRATE 1 % CREAM    Apply  topically 2 times daily. Apply topically daily.     FUROSEMIDE (LASIX) 40 MG TABLET    TAKE 1 TABLET DAILY    IRBESARTAN (AVAPRO) 300 MG TABLET    TAKE 1 TABLET DAILY    METOPROLOL TARTRATE (LOPRESSOR) 50 MG TABLET    TAKE ONE AND ONE-HALF TABLETS TWICE A DAY    OMEPRAZOLE (PRILOSEC) 40 MG DELAYED RELEASE CAPSULE    TAKE 1 CAPSULE DAILY    POTASSIUM CHLORIDE (KLOR-CON M) 10 MEQ EXTENDED RELEASE TABLET    TAKE 1 TABLET DAILY    SIMVASTATIN (ZOCOR) 10 MG TABLET    TAKE 1 TABLET NIGHTLY       ALLERGIES     is allergic to amiodarone, lamisil [terbinafine], morphine, nickel, and adhesive tape. FAMILY HISTORY     He indicated that his mother is . He indicated that his father is . He indicated that his brother is alive. family history includes Cancer in his mother; Dementia in his mother. SOCIAL HISTORY      reports that he quit smoking about 36 years ago. His smoking use included cigarettes. He has a 20.00 pack-year smoking history. He has quit using smokeless tobacco.  His smokeless tobacco use included chew. He reports current alcohol use. He reports that he does not use drugs. PHYSICAL EXAM     INITIAL VITALS:  height is 5' 10\" (1.778 m) and weight is 245 lb (111.1 kg). His temporal temperature is 97.4 °F (36.3 °C). His blood pressure is 129/68 and his pulse is 60. His respiration is 32 (abnormal) and oxygen saturation is 94%. Physical Exam  Vitals and nursing note reviewed. Constitutional:       Appearance: He is well-developed. Comments: Looks mildly ill but nontoxic   HENT:      Head: Atraumatic. Eyes:      Conjunctiva/sclera: Conjunctivae normal.      Pupils: Pupils are equal, round, and reactive to light. Neck:      Thyroid: No thyromegaly. Vascular: No JVD. Trachea: No tracheal deviation. Cardiovascular:      Rate and Rhythm: Normal rate and regular rhythm. Heart sounds: No murmur heard. No friction rub. No gallop. Pulmonary:      Effort: Pulmonary effort is normal.      Breath sounds: No wheezing or rales. Comments: His work of breathing is labored. Breath sounds are equal bilaterally,  Abdominal:      General: Bowel sounds are normal.      Palpations: Abdomen is soft. Tenderness: There is no abdominal tenderness. Musculoskeletal:         General: No tenderness.       Cervical back: Neck supple. Right lower leg: Edema present. Left lower leg: Edema present. Comments: Trace pretibial edema   Neurological:      Mental Status: He is alert. DIFFERENTIAL DIAGNOSIS:       DIAGNOSTIC RESULTS         LABS:   Labs Reviewed   COVID-19, RAPID - Abnormal; Notable for the following components:       Result Value    SARS-CoV-2, NAAT DETECTED (*)     All other components within normal limits       EMERGENCY DEPARTMENT COURSE:   Vitals:    Vitals:    06/06/22 1741   BP: 129/68   Pulse: 60   Resp: (!) 32   Temp: 97.4 °F (36.3 °C)   TempSrc: Temporal   SpO2: 94%   Weight: 245 lb (111.1 kg)   Height: 5' 10\" (1.778 m)     We checked his O2 sat while ambulatory, and dropped to 84% on room air. Went to 94% resting. He said that he was feeling fine. He did not appear toxic. I advised patient to go home on oxygen O2. We were ready to provide an oxygen tank and concentrator but he declined stating that he did not feel that bad and he did not think he needed oxygen since his resting O2 sat was 94%. He understood the risk of going home without oxygen including hypoxia worsening of respiratory status and even death. He was advised to call tomorrow for the availability of monoclonal antibodies since not available currently. He was advised to call 911 if he had any worsening of respiratory status or new symptoms    FINAL IMPRESSION      1. COVID-19 virus infection    2.  Shortness of breath          DISPOSITION/PLAN   He signed AGAINST MEDICAL ADVICE    PATIENT REFERRED TO:  Jhon Regalado MD  [address] [place] [address]  [phone]    In 2 days        DISCHARGE MEDICATIONS:  New Prescriptions    No medications on file       (Please note that portions of this note were completed with a voice recognition program.  Efforts were made to edit the dictations but occasionally words are mis-transcribed.)    Gabrielle Diaz MD           Gabriellelissy Diaz MD  06/06/22 3208

## 2022-06-06 NOTE — TELEPHONE ENCOUNTER
Per verbal order of Dr. Chelo Lees, hospitals are not offering the antibodies any longer for outpatient purposes and pt will need a positive test for the paxlovid rx. I informed Barney Sen and she stated she will call back with the results of pts test. Barney Sen also qualifies for the paxlovid if she wishes.

## 2022-06-06 NOTE — TELEPHONE ENCOUNTER
Pt's wife called to req Rayo Blum have the Monoclonal antibodies due to her testing positive for covid.     Please call Rabia Glass when addressed [phone]

## 2022-06-07 ENCOUNTER — HOSPITAL ENCOUNTER (OUTPATIENT)
Dept: GENERAL RADIOLOGY | Age: 82
Discharge: HOME | End: 2022-06-07
Payer: MEDICARE

## 2022-06-07 VITALS
BODY MASS INDEX: 33.64 KG/M2 | HEART RATE: 60 BPM | WEIGHT: 235 LBS | OXYGEN SATURATION: 96 % | HEIGHT: 70 IN | DIASTOLIC BLOOD PRESSURE: 60 MMHG | SYSTOLIC BLOOD PRESSURE: 112 MMHG | RESPIRATION RATE: 16 BRPM | TEMPERATURE: 97.2 F

## 2022-06-07 DIAGNOSIS — G47.33 OBSTRUCTIVE SLEEP APNEA SYNDROME: Primary | ICD-10-CM

## 2022-06-07 DIAGNOSIS — K21.9 GASTROESOPHAGEAL REFLUX DISEASE WITHOUT ESOPHAGITIS: ICD-10-CM

## 2022-06-07 DIAGNOSIS — I50.32 CHRONIC DIASTOLIC HEART FAILURE (HCC): ICD-10-CM

## 2022-06-07 PROCEDURE — 6360000002 HC RX W HCPCS: Performed by: EMERGENCY MEDICINE

## 2022-06-07 PROCEDURE — 96374 THER/PROPH/DIAG INJ IV PUSH: CPT

## 2022-06-07 PROCEDURE — M0222 HC BEBTELOVIMAB INJECTION: HCPCS

## 2022-06-07 RX ORDER — ONDANSETRON 2 MG/ML
8 INJECTION INTRAMUSCULAR; INTRAVENOUS
OUTPATIENT
Start: 2022-06-07

## 2022-06-07 RX ORDER — SODIUM CHLORIDE 9 MG/ML
INJECTION, SOLUTION INTRAVENOUS CONTINUOUS
OUTPATIENT
Start: 2022-06-07

## 2022-06-07 RX ORDER — DIPHENHYDRAMINE HYDROCHLORIDE 50 MG/ML
50 INJECTION INTRAMUSCULAR; INTRAVENOUS
OUTPATIENT
Start: 2022-06-07

## 2022-06-07 RX ORDER — SODIUM CHLORIDE 0.9 % (FLUSH) 0.9 %
5-40 SYRINGE (ML) INJECTION PRN
OUTPATIENT
Start: 2022-06-07

## 2022-06-07 RX ORDER — ACETAMINOPHEN 325 MG/1
650 TABLET ORAL
OUTPATIENT
Start: 2022-06-07

## 2022-06-07 RX ORDER — BEBTELOVIMAB 87.5 MG/ML
175 INJECTION, SOLUTION INTRAVENOUS ONCE
Status: COMPLETED | OUTPATIENT
Start: 2022-06-07 | End: 2022-06-07

## 2022-06-07 RX ORDER — HEPARIN SODIUM (PORCINE) LOCK FLUSH IV SOLN 100 UNIT/ML 100 UNIT/ML
500 SOLUTION INTRAVENOUS PRN
OUTPATIENT
Start: 2022-06-07

## 2022-06-07 RX ORDER — SODIUM CHLORIDE 9 MG/ML
5-250 INJECTION, SOLUTION INTRAVENOUS PRN
OUTPATIENT
Start: 2022-06-07

## 2022-06-07 RX ORDER — ALBUTEROL SULFATE 90 UG/1
4 AEROSOL, METERED RESPIRATORY (INHALATION) PRN
OUTPATIENT
Start: 2022-06-07

## 2022-06-07 RX ORDER — BEBTELOVIMAB 87.5 MG/ML
175 INJECTION, SOLUTION INTRAVENOUS ONCE
Status: CANCELLED | OUTPATIENT
Start: 2022-06-07 | End: 2022-06-07

## 2022-06-07 RX ADMIN — BEBTELOVIMAB 175 MG: 87.5 INJECTION, SOLUTION INTRAVENOUS at 14:56

## 2022-06-07 NOTE — PROGRESS NOTES
Pt to rm 3, walking without difficulty, alert and oriented. Positive for coVid yesterday. C/o cough, denies SOB.

## 2022-06-07 NOTE — PROGRESS NOTES
Pt didn't bring his glasses but spouse read the EUA papers on Bebtelovimab to pt. They agree with getting the medicine.      __met__ Safety:       (Environmental)   [place] to environment   Ensure ID band is correct and in place/ allergy band as needed   Assess for fall risk   Initiate fall precautions as applicable (fall band, side rails, etc.)   Call light within reach   Bed in low position/ wheels locked    __met__ Pain:        Assess pain level and characteristics   Administer analgesics as ordered   Assess effectiveness of pain management and report to MD as needed    _met___ Knowledge Deficit:   Assess baseline knowledge   Provide teaching at level of understanding   Provide teaching via preferred learning method   Evaluate teaching effectiveness    _met___ Hemodynamic/Respiratory Status:       (Pre and Post Procedure Monitoring)   Assess/Monitor vital signs and LOC   Assess Baseline SpO2 prior to any sedation   Obtain weight/height   Assess vital signs/ LOC until patient meets discharge criteria   Monitor procedure site and notify MD of any issues

## 2022-06-15 RX ORDER — METOPROLOL TARTRATE 50 MG/1
TABLET, FILM COATED ORAL
Qty: 270 TABLET | Refills: 0 | Status: SHIPPED | OUTPATIENT
Start: 2022-06-15 | End: 2022-09-13

## 2022-06-20 DIAGNOSIS — K29.50 ANTRAL GASTRITIS: ICD-10-CM

## 2022-06-20 RX ORDER — OMEPRAZOLE 40 MG/1
CAPSULE, DELAYED RELEASE ORAL
Qty: 90 CAPSULE | Refills: 3 | Status: SHIPPED | OUTPATIENT
Start: 2022-06-20

## 2022-06-20 NOTE — TELEPHONE ENCOUNTER
Date of last visit:  7/30/2021  Date of next visit:  8/5/2022    Requested Prescriptions     Pending Prescriptions Disp Refills    omeprazole (PRILOSEC) 40 MG delayed release capsule [Pharmacy Med Name: OMEPRAZOLE DR MALLY 40MG] 90 capsule 3     Sig: TAKE 1 CAPSULE DAILY

## 2022-07-05 ENCOUNTER — OFFICE VISIT (OUTPATIENT)
Dept: CARDIOLOGY CLINIC | Age: 82
End: 2022-07-05
Payer: MEDICARE

## 2022-07-05 ENCOUNTER — PROCEDURE VISIT (OUTPATIENT)
Dept: CARDIOLOGY CLINIC | Age: 82
End: 2022-07-05
Payer: MEDICARE

## 2022-07-05 VITALS
SYSTOLIC BLOOD PRESSURE: 128 MMHG | HEIGHT: 70 IN | HEART RATE: 59 BPM | WEIGHT: 241.2 LBS | BODY MASS INDEX: 34.53 KG/M2 | DIASTOLIC BLOOD PRESSURE: 74 MMHG

## 2022-07-05 DIAGNOSIS — Z95.0 HISTORY OF CARDIAC PACEMAKER IN SITU: Primary | ICD-10-CM

## 2022-07-05 DIAGNOSIS — F17.201 TOBACCO DEPENDENCE IN REMISSION: ICD-10-CM

## 2022-07-05 DIAGNOSIS — I47.29 NSVT (NONSUSTAINED VENTRICULAR TACHYCARDIA): ICD-10-CM

## 2022-07-05 DIAGNOSIS — E78.00 PURE HYPERCHOLESTEROLEMIA: ICD-10-CM

## 2022-07-05 DIAGNOSIS — I48.11 LONGSTANDING PERSISTENT ATRIAL FIBRILLATION (HCC): ICD-10-CM

## 2022-07-05 DIAGNOSIS — I10 ESSENTIAL HYPERTENSION: ICD-10-CM

## 2022-07-05 DIAGNOSIS — Z95.0 HISTORY OF CARDIAC PACEMAKER IN SITU: ICD-10-CM

## 2022-07-05 DIAGNOSIS — I50.32 CHRONIC DIASTOLIC HEART FAILURE (HCC): Primary | ICD-10-CM

## 2022-07-05 DIAGNOSIS — I71.40 ABDOMINAL AORTIC ANEURYSM (AAA) WITHOUT RUPTURE: ICD-10-CM

## 2022-07-05 PROCEDURE — 93294 REM INTERROG EVL PM/LDLS PM: CPT | Performed by: INTERNAL MEDICINE

## 2022-07-05 PROCEDURE — 93296 REM INTERROG EVL PM/IDS: CPT | Performed by: INTERNAL MEDICINE

## 2022-07-05 PROCEDURE — 99214 OFFICE O/P EST MOD 30 MIN: CPT | Performed by: INTERNAL MEDICINE

## 2022-07-05 PROCEDURE — 1123F ACP DISCUSS/DSCN MKR DOCD: CPT | Performed by: INTERNAL MEDICINE

## 2022-07-05 NOTE — PROGRESS NOTES
[employer] jhon sci dual pacer   Programmed VVIR       . Algis Broaden Battery longevity:  <3 months  Will recheck Aug 8  Presenting rhythm      Atrial impedance 631  RV impedance 508    P wave sensing not measured   R wave sensing 4.8    0 % atrial paced  99 % RV paced     RV threshold 1.9 V at 0.4ms

## 2022-07-05 NOTE — PROGRESS NOTES
Chief Complaint   Patient presents with    6 Month Follow-Up    Congestive Heart Failure    Atrial Fibrillation   Originally Patient her to establish cardiologist. He was going to have umbilical hernia repair with Dr. Pao King yesterday but it was canceled due to new onset A-fib on EKG. Was admitted at [facility] for chf ex and RT pleural effusion and had thoracenthesis over 2 liter and doubled lasix and sent home march 2019      6 month follow up. EKG done 2-. NO wt gain    Leg edema better with compression stocking    Sob on exertion- chronic     Denies cp, or palpitation     Had  Pacer in oCt 31 2013    Could not tolerate the amiodarone( shaky, destressed and constipated ) and stopped- all resolved and feel good    He is back to AFIb few days after Cardioversion    Pacer site looks good    Patient Seen, Chart, Consults notes, Labs, Radiology studies reviewed.        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    Impotence    Gastritis    Gastroesophageal reflux disease    Umbilical hernia    Daytime somnolence    Restless legs    Chronotropic incompetence    Dizziness- intermittent for the last few months new    AV block, [place] 1    History of cardiac pacemaker in situ    Chronic diastolic heart failure (HCC)    Diastolic CHF, chronic (HCC)    Obstructive sleep apnea syndrome    Essential hypertension    Pure hypercholesterolemia    Abdominal aortic aneurysm (HCC)    SOB (shortness of breath) on exertion    NSVT (nonsustained ventricular tachycardia) ([address] on B-B and lab okay    Longstanding persistent atrial fibrillation [facility])       Past Surgical History:   Procedure Laterality Date    ANKLE ARTHROCENTESIS  2001    right    APPENDECTOMY  1952    BREAST SURGERY  1988    lumpectomy left    CARDIOVASCULAR STRESS TEST  6-27-13    CARDIOVASCULAR STRESS TEST  6-26-13    HOLTER     COLONOSCOPY     COLONOSCOPY Left 10/22/2019    COLONOSCOPY DIAGNOSTIC performed by Barak Fam MD [address]    right inguinal    HERNIA REPAIR  14    Dr. Styles Dies hernia repair with mesh    MALIGNANT SKIN LESION EXCISION      left side of neck    PACEMAKER INSERTION  10/31/13    PACEMAKER PLACEMENT  10/2013    SKIN BIOPSY  10/9/14    Dr Jak Hernandez SKIN BIOPSY  4/16/15    Dr Ethan Patrick  2015    SKIN CANCER EXCISION  2019    THORACENTESIS  03/10/2019    TONSILLECTOMY  1950    TRANSTHORACIC ECHOCARDIOGRAM  13    UPPER GASTROINTESTINAL ENDOSCOPY Left 10/22/2019    EGD ESOPHAGOGASTRODUODENOSCOPY performed by Barak Fam MD at [facility]    VARICOCELECTOMY  1988    right       Allergies   Allergen Reactions    Amiodarone      DEPRESSION, CONSTIPATION    Lamisil [Terbinafine] Itching    Morphine Other (See Comments)     hallucinations    Nickel Hives    Adhesive Tape Rash        Family History   Problem Relation Age of Onset    Dementia Mother     Cancer Mother         breast        Social History     Socioeconomic History    Marital status:      Spouse name: Not on file    Number of children: Not on file    Years of education: Not on file    Highest education level: Not on file   Occupational History    Occupation: retired   Tobacco Use    Smoking status: Former Smoker     Packs/day: 1.00     Years: 20.00     Pack years: 20.00     Types: Cigarettes     Quit date: 1986     Years since quittin.5    Smokeless tobacco: Former User     Types: Chew   Vaping Use    Vaping Use: Never used   Substance and Sexual Activity    Alcohol use:  Yes     Alcohol/week: 0.0 standard drinks     Comment: USALLY 1 DRINK DAILY    Drug use: No    Sexual activity: Yes     Partners: Female   Other Topics Concern    Not on file   Social History Narrative    Not on file     Social Determinants of Health Financial Resource Strain:     Difficulty of Paying Living Expenses: Not on file   Food Insecurity:     Worried About Running Out of Food in the Last Year: Not on file    Clementine of Food in the Last Year: Not on file   Transportation Needs:     Lack of Transportation (Medical): Not on file    Lack of Transportation (Non-Medical):  Not on file   Physical Activity:     Days of Exercise per Week: Not on file    Minutes of Exercise per Session: Not on file   Stress:     Feeling of Stress : Not on file   Social Connections:     Frequency of Communication with Friends and Family: Not on file    Frequency of Social Gatherings with Friends and Family: Not on file    Attends Mandaeism Services: Not on file    Active Member of Clubs or Organizations: Not on file    Attends Club or Organization Meetings: Not on file    Marital Status: Not on file   Intimate Partner Violence:     Fear of Current or Ex-Partner: Not on file    Emotionally Abused: Not on file    Physically Abused: Not on file    Sexually Abused: Not on file   Housing Stability:     Unable to Pay for Housing in the Last Year: Not on file    Number of Places Lived in the Last Year: Not on file    Unstable Housing in the Last Year: Not on file       Current Outpatient Medications   Medication Sig Dispense Refill    omeprazole (PRILOSEC) 40 MG delayed release capsule TAKE 1 CAPSULE DAILY 90 capsule 3    metoprolol tartrate (LOPRESSOR) 50 MG tablet TAKE ONE AND ONE-HALF TABLETS TWICE A  tablet 0    potassium chloride (KLOR-CON M) 10 MEQ extended release tablet TAKE 1 TABLET DAILY 180 tablet 1    furosemide (LASIX) 40 MG tablet TAKE 1 TABLET DAILY 90 tablet 1    simvastatin (ZOCOR) 10 MG tablet TAKE 1 TABLET NIGHTLY 90 tablet 1    amLODIPine (NORVASC) 10 MG tablet TAKE 1 TABLET DAILY 90 tablet 3    doxepin (SINEQUAN) 25 MG capsule TAKE 1 OR 2 CAPSULES AT NIGHT FOR INSOMNIA 180 capsule 3    apixaban (ELIQUIS) 5 MG TABS tablet TAKE 1 TABLET TWICE A  tablet 3    Apoaequorin (PREVAGEN EXTRA STRENGTH) 20 MG CAPS Take 1 capsule by mouth daily      amoxicillin (AMOXIL) 500 MG capsule Take 500 mg by mouth 3 times daily      ciclopirox (LOPROX) 0.77 % cream Apply topically daily as needed Apply topically 2 times daily.  aspirin EC 81 MG EC tablet Take 1 tablet by mouth daily. 30 tablet 3    econazole nitrate 1 % cream Apply  topically 2 times daily. Apply topically daily.  Calcium-Magnesium-Vitamin D (CITRACAL CALCIUM+D) 600- MG-MG-UNIT TB24 Take 1 tablet by mouth Daily. No current facility-administered medications for this visit. Review of Systems -     General ROS: negative  Psychological ROS: negative  Hematological and Lymphatic ROS: No history of blood clots or bleeding disorder. Respiratory ROS: no cough, shortness of breath, or wheezing  Cardiovascular ROS: no chest pain or dyspnea on exertion  Gastrointestinal ROS: negative  Genito-Urinary ROS: no dysuria, trouble voiding, or hematuria  Musculoskeletal ROS: negative  Neurological ROS: no TIA or stroke symptoms  Dermatological ROS: negative      Blood pressure 128/74, pulse 59, height 5' 10\" (1.778 m), weight 241 lb 3.2 oz (109.4 kg).       Physical Examination:    General appearance - alert, well appearing, and in no distress  Mental status - alert, oriented to person, place, and time  Neck - supple, no significant adenopathy, no JVD, or carotid bruits  Chest - clear to auscultation, no wheezes, rales or rhonchi, symmetric air entry  Heart - normal rate, regular rhythm, normal S1, S2, no murmurs, rubs, clicks or gallops  Abdomen - soft, nontender, nondistended, no masses or organomegaly  Neurological - alert, oriented, normal speech, no focal findings or movement disorder noted  Musculoskeletal - no joint tenderness, deformity or swelling  Extremities - peripheral pulses normal, no pedal edema, no clubbing or cyanosis  Skin - normal coloration and turgor, no rashes, no suspicious skin lesions noted    Lab  No results for input(s): CKTOTAL, CKMB, CKMBINDEX, TROPONINI in the last 72 hours. CBC:   Lab Results   Component Value Date/Time    WBC 5.6 04/09/2016 08:45 AM    WBC 7.8 12/17/2013 12:00 AM    RBC 4.63 04/09/2016 08:45 AM    HGB 14.6 04/09/2016 08:45 AM    HCT 43.6 04/09/2016 08:45 AM    MCV 94.1 04/09/2016 08:45 AM    MCH 31.5 04/09/2016 08:45 AM    MCHC 33.5 04/09/2016 08:45 AM    RDW 14.1 04/09/2016 08:45 AM     04/09/2016 08:45 AM     12/17/2013 12:00 AM    MPV 9.3 11/04/2013 04:13 AM     BMP:    Lab Results   Component Value Date/Time     06/26/2019 07:30 AM    K 4.7 06/26/2019 07:30 AM     06/26/2019 07:30 AM    CO2 30 06/26/2019 07:30 AM    BUN 18 06/26/2019 07:30 AM    LABALBU 4.2 07/07/2020 07:15 AM    CREATININE 1.00 06/26/2019 07:30 AM    CALCIUM 9.7 06/26/2019 07:30 AM    LABGLOM 66 06/03/2014 12:00 AM    LABGLOM 74 11/04/2013 06:00 AM    GLUCOSE 97 06/26/2019 07:30 AM     Hepatic Function Panel:    Lab Results   Component Value Date/Time    ALKPHOS 81 07/07/2020 07:15 AM    ALKPHOS 83 06/03/2014 12:00 AM     Magnesium:    Lab Results   Component Value Date/Time    MG 2.1 06/26/2019 07:30 AM     Warfarin PT/INR:    No components found for: PTPATWAR,  PTINRWAR  HgBA1c:    No results found for: LABA1C  FLP:    No components found for: CHLPL,  TRIG,  HDL,  LDLCALC,  LDLDIRECT,  LABVLDL  TSH:    Lab Results   Component Value Date/Time    TSH 1.600 04/09/2016 08:45 AM       EKG 6/25/13  Atrial fibrillation   -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 ABNORMAL   holter CONCLUSION:   1. The predominant rhythm appears to be atrial fibrillation with   average rate of 50, maximum is 95, minimal of 26.   2. The patient has 2.2 second and 2.8 second pauses. This pattern   does seem to favor sick sinus syndrome. May need serial   monitoring if clinically indicated. 3. The patient appears to be in atrial fibrillation throughout the   recording. Anticoagulation may be recommended if no   contraindication and if clinically indicated. 4. There are some infrequent premature ventricular contractions. Couplets also noted on the study. 5. This is an abnormal Holter. Clinical correlation and follow up if   clinically indicated suggested. 6. One may want to consider serial follow up on this patient because   of the possible sick sinus syndrome features of the Holter monitor. Holter after CPAP repeated - NO changes actually had 3.8 sec pause- got worse  CONCLUSION: This is an abnormal Holter monitor finding with 100% atrial   fibrillation, average heart rate 46 beats per minute ranging from 25 to 89   beats per minute. There is a pause of 3.8 seconds at 5 a.m. in the morning. Probably the patient was asleep at that time. Minimum heart rate of 25 beats   per minute was also at 5 a.m. in the morning. It is abnormal for frequent ventricular ectopic beats. At this time, the patient is completely asymptomatic at least from the diary. RECOMMENDATION: Needs further clinical correlation. Consider evaluation of   this patient. If the patient needs to be on any beta-blocker, any calcium   channel blocker, or any rate reducing medication, the patient may need a   pacemaker. If the patient has any significant symptoms or any symptomatology, suggestive   consideration should be given for pacemaker placement. The patient has significant bradyarrhythmia. The baseline is bradycardic and   also he has a significant cause of 3.8 seconds. However if the patient is   asymptomatic and in view of the background atrial fibrillation, we will   continue with observation and close follow up. [place] M.D.     EKG: Atrial fibrillation-slow ventricular response rate of 36 BPM-Nonspecific QRS widening and anterior fascicular block.    -Anteroseptal infarct -age undetermined. ABNORMAL     Sleep study _moderate LOVE obstructive AHI of 19    EKG:-V paced rhythm     EKH 9/30/14-Electronic ventricular pacemaker underlying afib    EKG afib with V pacing      Small to moderate right-sided pleural effusion is demonstrated along with right basilar consolidative airspace disease which may represent atelectasis or pneumonia. However, recommend short-term follow-up to document resolution.               This report has been created using voice recognition software.  It may contain minor errors which are inherent in voice recognition technology.       Final report electronically signed by Dr. Sarah Espinal on 4/8/2019 11:52 AM         Persistence of a small to moderate right-sided pleural effusion with right basilar opacities which may represent atelectasis or infiltrate. Oral stable appearance of the chest when compared to the previous exam.           This report has been created using voice recognition software. It may contain minor errors which are inherent in voice recognition technology.       Final report electronically signed by Dr Sae Rose on 6/4/2019 1:05 PM     There is an infrarenal abdominal aortic aneurysm measuring 3.6 x 3.3 cm. Previously this measured 3.2 x 3.1 cm on Friday, 5/10/2018.       2. Mild bilateral common iliac artery ectasia is noted. These measure 1.6 cm in diameter bilaterally.               This report has been created using voice recognition software.  It may contain minor errors which are inherent in voice recognition technology.       Final report electronically signed by Dr. Sarah Espinal on 5/19/2021 4:50 PM             EKG 9/30/19  Electronic ventricular pacemaker   Pacemaker ECG, No further analysis   INSUFFICIENT DATA      Echo  Conclusions      Summary   Left ventricle size is normal.   Mildly increased left ventricle wall thickness. Systolic function was normal.   There were no regional wall motion abnormalities.    Ejection fraction is visually estimated at 55%. The left atrium is Severely dilated. Mild-to-moderate mitral stenosis. The peak mitral valve velocity was 2.2 m/s, peak gradient was 18 mmHg, and   the mean gradient was 6 mmHg. Right ventricular systolic pressure measures 55 mmhg. Signature      ----------------------------------------------------------------   Electronically signed by Terri Aguilar MD (Interpreting   physician) on 06/15/2020 at 06:56 PM    Assessment  B/L PPP edema trace to +1 CONT LASIX    Pacemaker dependant under battery wtach     Diagnosis Orders   1. Chronic diastolic heart failure (HCC)  Basic Metabolic Panel    Magnesium    Lipid Panel    Hepatic Function Panel   2. Longstanding persistent atrial fibrillation [facility]  Basic Metabolic Panel    Magnesium    Lipid Panel    Hepatic Function Panel   3. Essential hypertension  Basic Metabolic Panel    Magnesium    Lipid Panel    Hepatic Function Panel   4. Pure hypercholesterolemia  Basic Metabolic Panel    Magnesium    Lipid Panel    Hepatic Function Panel   5. Abdominal aortic aneurysm (AAA) without rupture [facility])  Basic Metabolic Panel    Magnesium    Lipid Panel    Hepatic Function Panel   6. NSVT (nonsustained ventricular tachycardia) ([facility])  Basic Metabolic Panel    Magnesium    Lipid Panel    Hepatic Function Panel   7. Tobacco dependence in remission-quit 2263  Basic Metabolic Panel    Magnesium    Lipid Panel    Hepatic Function Panel   8. History of cardiac pacemaker in situ  Basic Metabolic Panel    Magnesium    Lipid Panel    Hepatic Function Panel         Previous admission DX  ASSESSMENT:   1. Acute diastolic congestive heart failure. The patient had   significant shortness of breath and basically having chronic   shortness of breath was not addressed and basically we will treat   him for that.    2. Status post pacemaker placement for sick sinus syndrome with slow   ventricular response, pause of 3.8 and complete heart block noted   in the cath lab while performing the procedure on A-pacing and no   V-capturing. There is no conduction. No capturing. On A-pacing,   there is no conduction to ventricle. 3. Status post pacemaker placement, [facility] and pacemaker   has been interrogated. Interrogation was functionally normal.   4. Hypertension. 5. Hyperlipidemia. 6. Obstructive sleep apnea on C-PAP. 7. Obesity. 8. History of erectile dysfunction. 9. History of dizziness, generalized body weakness, easy   fatigability, feeling sleep probably related to the sick sinus   syndrome and AV with slow ventricular response. That has been   Resolved. nUC STRESS TEST NET jUNE 2013      Plan     The  current meds and labs reviewed    Hypertension, on medical treatment. Seems to be under good control. Patient is compliant with medical treatment. SOB ON EXERTION   NEED ISCHEMIA WORK UP  DECLINED echo and STRESS TEST AND WANT TO CONT   INFORMED TO COME OR CALL IF CHANGE HIS MIND    Congestive heart failure: no evidence of fluid overload today, no recent hospitalization for CHF  Cont  Lasix 40 mg po qd  CXR-4/8/19  Weight measurment   Call if wt gain > 5 lb in 1 weeks  Lab from April 2021 WNL  Need labs    Hypertension, on medical treatment. Seems to be under good control. Patient is compliant with medical treatment. HTN need better control  Cont  avapro  300 mg po qd  Cont  Norvasc 10 po qd  Pacer interrogation  April 2021  KNOWN AFIB /ELIQUIS   NSVT longest 11 sec composed 11 beat at rate 177 bpm    [employer] DUAL PACEMAKER REMOTE   BATTERY < 3 months  Under battery watch  V paced 99%     Hyperlipidemia: on statins, followed periodically. Patient need periodic lipid and liver profile.     Persistent atr fib  Cont  Asa 81 mg po QD  atr fib  Cont-apixaban 5 mg po BID    Discussed the risk and benefit of OA and he want to cont with meds    Off  amiodarone for pat intolerance- pat did well and feel much better    CXR RT Pleural Effusion unchanged  Small Rt pleural effusion- chronic stable    Pat is Overall Stable and sob getting better    Lipid panel and liver function test before next appointment    Discussed use, benefit, and side effects of prescribed medications. All patient questions answered. Pt voiced understanding. Instructed to continue current medications, diet and exercise. Continue risk factor modification and medical management. Patient agreed with treatment plan. Follow up as directed.       RTC in 6 months      Mehran Varner [facility]

## 2022-07-06 LAB
ALBUMIN SERPL-MCNC: 4.4 G/DL (ref 3.5–5.2)
ALK PHOSPHATASE: 88 U/L (ref 40–125)
ALT SERPL-CCNC: 15 U/L (ref 5–50)
ANION GAP SERPL CALCULATED.3IONS-SCNC: 10 MEQ/L (ref 10–19)
AST SERPL-CCNC: 23 U/L (ref 9–50)
BILIRUB SERPL-MCNC: 0.7 MG/DL
BILIRUBIN DIRECT: 0.2 MG/DL (ref 0–0.3)
BUN BLDV-MCNC: 12 MG/DL (ref 8–23)
CALCIUM SERPL-MCNC: 8.9 MG/DL (ref 8.5–10.5)
CHLORIDE BLD-SCNC: 102 MEQ/L (ref 95–107)
CHOLESTEROL/HDL RATIO: 2.8 RATIO
CHOLESTEROL: 123 MG/DL
CO2: 29 MEQ/L (ref 19–31)
CREAT SERPL-MCNC: 1.04 MG/DL (ref 0.8–1.4)
EGFR IF NONAFRICAN AMERICAN: 73 ML/MIN/1.73
GLUCOSE: 93 MG/DL (ref 70–99)
HDLC SERPL-MCNC: 44 MG/DL
LDL CHOLESTEROL CALCULATED: 67 MG/DL
LDL/HDL RATIO: 1.5 RATIO
MAGNESIUM: 2.2 MG/DL (ref 1.6–2.6)
POTASSIUM SERPL-SCNC: 4.4 MEQ/L (ref 3.5–5.4)
SODIUM BLD-SCNC: 141 MEQ/L (ref 133–146)
TOTAL PROTEIN: 6.9 G/DL (ref 6.1–8.3)
TRIGL SERPL-MCNC: 62 MG/DL
VLDLC SERPL CALC-MCNC: 12 MG/DL

## 2022-07-18 RX ORDER — SIMVASTATIN 10 MG
TABLET ORAL
Qty: 90 TABLET | Refills: 3 | Status: SHIPPED | OUTPATIENT
Start: 2022-07-18

## 2022-08-05 ENCOUNTER — OFFICE VISIT (OUTPATIENT)
Dept: FAMILY MEDICINE CLINIC | Age: 82
End: 2022-08-05

## 2022-08-05 VITALS
HEIGHT: 70 IN | SYSTOLIC BLOOD PRESSURE: 128 MMHG | HEART RATE: 60 BPM | RESPIRATION RATE: 16 BRPM | WEIGHT: 239.38 LBS | DIASTOLIC BLOOD PRESSURE: 80 MMHG | BODY MASS INDEX: 34.27 KG/M2

## 2022-08-05 DIAGNOSIS — I50.32 CHRONIC DIASTOLIC HEART FAILURE (HCC): ICD-10-CM

## 2022-08-05 DIAGNOSIS — I10 ESSENTIAL HYPERTENSION: ICD-10-CM

## 2022-08-05 DIAGNOSIS — Z00.00 MEDICARE ANNUAL WELLNESS VISIT, SUBSEQUENT: Primary | ICD-10-CM

## 2022-08-05 PROCEDURE — 99213 OFFICE O/P EST LOW 20 MIN: CPT | Performed by: FAMILY MEDICINE

## 2022-08-05 PROCEDURE — G0439 PPPS, SUBSEQ VISIT: HCPCS | Performed by: FAMILY MEDICINE

## 2022-08-05 PROCEDURE — 1123F ACP DISCUSS/DSCN MKR DOCD: CPT | Performed by: FAMILY MEDICINE

## 2022-08-05 SDOH — ECONOMIC STABILITY: FOOD INSECURITY: WITHIN THE PAST 12 MONTHS, THE FOOD YOU BOUGHT JUST DIDN'T LAST AND YOU DIDN'T HAVE MONEY TO GET MORE.: NEVER TRUE

## 2022-08-05 SDOH — ECONOMIC STABILITY: FOOD INSECURITY: WITHIN THE PAST 12 MONTHS, YOU WORRIED THAT YOUR FOOD WOULD RUN OUT BEFORE YOU GOT MONEY TO BUY MORE.: NEVER TRUE

## 2022-08-05 ASSESSMENT — LIFESTYLE VARIABLES
HAVE YOU OR SOMEONE ELSE BEEN INJURED AS A RESULT OF YOUR DRINKING: 0
HOW OFTEN DURING THE LAST YEAR HAVE YOU NEEDED AN ALCOHOLIC DRINK FIRST THING IN THE MORNING TO GET YOURSELF GOING AFTER A NIGHT OF HEAVY DRINKING: 0
HOW OFTEN DURING THE LAST YEAR HAVE YOU BEEN UNABLE TO REMEMBER WHAT HAPPENED THE NIGHT BEFORE BECAUSE YOU HAD BEEN DRINKING: 0
HAS A RELATIVE, FRIEND, DOCTOR, OR ANOTHER HEALTH PROFESSIONAL EXPRESSED CONCERN ABOUT YOUR DRINKING OR SUGGESTED YOU CUT DOWN: 0
HOW OFTEN DO YOU HAVE A DRINK CONTAINING ALCOHOL: 4 OR MORE TIMES A WEEK
HOW OFTEN DURING THE LAST YEAR HAVE YOU FAILED TO DO WHAT WAS NORMALLY EXPECTED FROM YOU BECAUSE OF DRINKING: 0
HOW MANY STANDARD DRINKS CONTAINING ALCOHOL DO YOU HAVE ON A TYPICAL DAY: 1 OR 2
HOW OFTEN DURING THE LAST YEAR HAVE YOU HAD A FEELING OF GUILT OR REMORSE AFTER DRINKING: 0
HOW OFTEN DURING THE LAST YEAR HAVE YOU FOUND THAT YOU WERE NOT ABLE TO STOP DRINKING ONCE YOU HAD STARTED: 0

## 2022-08-05 ASSESSMENT — SOCIAL DETERMINANTS OF HEALTH (SDOH): HOW HARD IS IT FOR YOU TO PAY FOR THE VERY BASICS LIKE FOOD, HOUSING, MEDICAL CARE, AND HEATING?: NOT HARD AT ALL

## 2022-08-05 ASSESSMENT — PATIENT HEALTH QUESTIONNAIRE - PHQ9
SUM OF ALL RESPONSES TO PHQ9 QUESTIONS 1 & 2: 0
2. FEELING DOWN, DEPRESSED OR HOPELESS: 0
SUM OF ALL RESPONSES TO PHQ QUESTIONS 1-9: 0
SUM OF ALL RESPONSES TO PHQ QUESTIONS 1-9: 0
1. LITTLE INTEREST OR PLEASURE IN DOING THINGS: 0
SUM OF ALL RESPONSES TO PHQ QUESTIONS 1-9: 0
SUM OF ALL RESPONSES TO PHQ QUESTIONS 1-9: 0

## 2022-08-05 NOTE — PROGRESS NOTES
Medicare Annual Wellness Visit    Chris Ortega is here for Medicare AWV    Assessment & Plan   Medicare annual wellness visit, subsequent  -     [place] OFF[place] OUTPATIENT VISIT 25 MINUTES [93195]  Essential hypertension  -     [place] OFF[place] OUTPATIENT VISIT 25 MINUTES [29443]  Chronic diastolic heart failure ([address]  -     [place] OFF[place] OUTPATIENT VISIT 25 MINUTES [86494]    Recommendations for Preventive Services Due: see orders and patient instructions/AVS.  Recommended screening schedule for the next 5-10 years is provided to the patient in written form: see Patient Instructions/AVS.     Return in 9 months (on 5/1/2023) for Medicare Annual Wellness Visit in 1 year. Subjective   We discussed the falls and the need to exercise  There is sinus drainage that seems to follow taking the meds  There is soreness to the medial left knee and discussed using volteran gel  He feels that the breathing is doing well  He doesn't notice any palpitation  He continues to drive    Patient's complete Health Risk Assessment and screening values have been reviewed and are found in Flowsheets. The following problems were reviewed today and where indicated follow up appointments were made and/or referrals ordered. Positive Risk Factor Screenings with Interventions:    Fall Risk:  Do you feel unsteady or are you worried about falling? : (!) yes  2 or more falls in past year?: (!) yes  Fall with injury in past year?: (!) yes   Fall Risk Interventions:     There are some dizzy episodes  We reviewed the need to exercise for strength and balance            General Health and ACP:  General  In general, how would you say your health is?: Fair  In the past 7 days, have you experienced any of the following: New or Increased Pain, New or Increased Fatigue, Loneliness, Social Isolation, Stress or Anger?: (!) Yes  Select all that apply: (!) New or Increased Fatigue  Do you get the social and emotional support that you need?: Yes  Do you have a Living Will?: Yes    Advance Directives       Power of  Living Will ACP-Advance Directive ACP-Power of     Not on File [place] on 05/13/15 Filed Arden Macario Risk Interventions:  He is getting slower as he ages    Health Habits/Nutrition:  Physical Activity: Insufficiently Active    Days of Exercise per Week: 3 days    Minutes of Exercise per Session: 30 min     Have you lost any weight without trying in the past 3 months?: No  Body mass index: (!) 34.34  Have you seen the dentist within the past year?: Yes  Health Habits/Nutrition Interventions: The weight is stable             Objective   Vitals:    08/05/22 0914   BP: 128/80   Site: Right Upper Arm   Position: Sitting   Cuff Size: Medium Adult   Pulse: 60   Resp: 16   Weight: 239 lb 6 oz (108.6 kg)   Height: 5' 10\" (1.778 m)      Body mass index is 34.35 kg/m².       General Appearance: alert and oriented to person, place and time, well-developed and well-nourished, in no acute distress  Skin: warm and dry, no rash or erythema  Head: normocephalic and atraumatic  Eyes: pupils equal, round, and reactive to light, extraocular eye movements intact, conjunctivae normal  ENT: tympanic membrane, external ear and ear canal normal bilaterally, oropharynx clear and moist with normal mucous membranes  Neck: neck supple and non tender without mass, no thyromegaly or thyroid nodules, no cervical lymphadenopathy   Pulmonary/Chest: clear to auscultation bilaterally- no wheezes, rales or rhonchi, normal air movement, no respiratory distress  Cardiovascular: normal rate, regular rhythm, normal S1 and S2, no murmurs, no gallops, and no carotid bruits  Abdomen: soft, non-tender, non-distended, normal bowel sounds, no masses or organomegaly  Extremities: no cyanosis and no clubbing  Musculoskeletal: normal range of motion, no joint swelling, deformity or tenderness  Neurologic: gait and coordination normal and speech normal       Allergies   Allergen Reactions Amiodarone      DEPRESSION, CONSTIPATION    Lamisil [Terbinafine] Itching    Morphine Other (See Comments)     hallucinations    Nickel Hives    Adhesive Tape Rash     Prior to Visit Medications    Medication Sig Taking? Authorizing Provider   simvastatin (ZOCOR) 10 MG tablet TAKE 1 TABLET NIGHTLY  Cari Luz MD   apixaban (ELIQUIS) 5 MG TABS tablet TAKE 1 TABLET TWICE A DAY  Rosebud Fruits, APRN - CNP   omeprazole (PRILOSEC) 40 MG delayed release capsule TAKE 1 CAPSULE DAILY  Daniel Arias MD   metoprolol tartrate (LOPRESSOR) 50 MG tablet TAKE ONE AND ONE-HALF TABLETS TWICE A DAY  Giovanni Bosch MD   potassium chloride (KLOR-CON M) 10 MEQ extended release tablet TAKE 1 TABLET DAILY  Rosebud Fruits, APRKHADRA - CNP   furosemide (LASIX) 40 MG tablet TAKE 1 TABLET DAILY  Rosebud Fruits, APRN - CNP   amLODIPine (NORVASC) 10 MG tablet TAKE 1 TABLET DAILY  Rosebud Fruits, APRN - CNP   doxepin (SINEQUAN) 25 MG capsule TAKE 1 OR 2 CAPSULES AT NIGHT FOR INSOMNIA  Rani Muñoz PA-C   Apoaequorin (PREVAGEN EXTRA STRENGTH) 20 MG CAPS Take 1 capsule by mouth daily  Daniel Arias MD   amoxicillin (AMOXIL) 500 MG capsule Take 500 mg by mouth 3 times daily  Historical Provider, MD   ciclopirox (LOPROX) 0.77 % cream Apply topically daily as needed Apply topically 2 times daily. Historical Provider, MD   aspirin EC 81 MG EC tablet Take 1 tablet by mouth daily. Cari Luz MD   econazole nitrate 1 % cream Apply  topically 2 times daily. Apply topically daily. Historical Provider, MD   Calcium-Magnesium-Vitamin D (CITRACAL CALCIUM+D) 600- MG-MG-UNIT TB24 Take 1 tablet by mouth Daily.   Daniel Arias MD       [facility] (Including outside providers/suppliers regularly involved in providing care):   Patient Care Team:  Daniel Arias MD as PCP - General (Family Medicine)  Daniel Arias MD as PCP - REHABILITATION [facility] Empaneled Provider  Estuardo Farrar MD as Consulting Physician (Gastroenterology)  Ezra Aguilar MD as Cardiologist (Cardiology)     Reviewed and updated this visit:  Tobacco  Allergies  Meds  Med Hx  Surg Hx  Soc Hx  Fam Hx

## 2022-08-08 ENCOUNTER — PROCEDURE VISIT (OUTPATIENT)
Dept: CARDIOLOGY CLINIC | Age: 82
End: 2022-08-08

## 2022-08-08 DIAGNOSIS — Z95.0 PACEMAKER: Primary | ICD-10-CM

## 2022-08-08 NOTE — PROGRESS NOTES
Dr Arias Carry pt    Nxt [employer] dual pacer remote   Battery <3 mths remaining/ monthly battery watch     Known afib/ eliquis   Vvir     V paced 99%    Rv waves 7.2  Atrial impedence 624  Vent impedence 496    Vent threshold 1.8 @ 0.4    Vent amplitude 2.3 @ 0.4 auto     6/25/22 1 high vent rate episode for :10 seconds   Could be afib with rvr    N/C D/T LESS THAN 90 DAYS

## 2022-08-10 RX ORDER — FUROSEMIDE 40 MG/1
TABLET ORAL
Qty: 90 TABLET | Refills: 3 | Status: SHIPPED | OUTPATIENT
Start: 2022-08-10

## 2022-09-06 ENCOUNTER — TELEPHONE (OUTPATIENT)
Dept: CARDIOLOGY CLINIC | Age: 82
End: 2022-09-06

## 2022-09-06 DIAGNOSIS — Z45.010 PACEMAKER GENERATOR END OF LIFE: ICD-10-CM

## 2022-09-06 DIAGNOSIS — I47.29 NSVT (NONSUSTAINED VENTRICULAR TACHYCARDIA): ICD-10-CM

## 2022-09-06 DIAGNOSIS — I50.32 CHRONIC DIASTOLIC HEART FAILURE (HCC): ICD-10-CM

## 2022-09-06 DIAGNOSIS — R06.02 SOB (SHORTNESS OF BREATH) ON EXERTION: ICD-10-CM

## 2022-09-06 DIAGNOSIS — I10 ESSENTIAL HYPERTENSION: ICD-10-CM

## 2022-09-06 DIAGNOSIS — I50.9 CONGESTIVE HEART FAILURE, UNSPECIFIED HF CHRONICITY, UNSPECIFIED HEART FAILURE TYPE (HCC): Primary | ICD-10-CM

## 2022-09-06 NOTE — TELEPHONE ENCOUNTER
Dr Yolanda Baugh pt       Has a Biba dual pacer  Download received today and pt hit ebony on 9/3/22      Last echo 6/15/20 with an ef of 55%    Dr Nino Candelaria do you want that repeated to see if he needs an upgrade?     He does have a dx of chf in his problem list     Lm for pt to call the office

## 2022-09-06 NOTE — TELEPHONE ENCOUNTER
Wife and pt called the office . Did tell them that he is ready for a gen change.  Just waiting for dr's to respond       No orders placed yet until we get an answer but pt said he is just fine with dr Vargas [facility] changing out his generator    We need to call pt back at [phone]

## 2022-09-07 NOTE — TELEPHONE ENCOUNTER
Pt and wife called office. Updated on need for echo prior to change out. Pt is dependent on pacemaker. Paces 100%  DIANNE triggered 9/3/22. Order for limited echo and generator change out placed in [facility] and given to [facility] for scheduling. Lili, Please call wife, Syd Banks [phone]. She is on HIPPA.

## 2022-09-12 ENCOUNTER — HOSPITAL ENCOUNTER (OUTPATIENT)
Dept: NON INVASIVE DIAGNOSTICS | Age: 82
Discharge: HOME | End: 2022-09-12
Payer: MEDICARE

## 2022-09-12 DIAGNOSIS — Z45.010 PACEMAKER GENERATOR END OF LIFE: ICD-10-CM

## 2022-09-12 DIAGNOSIS — R06.02 SOB (SHORTNESS OF BREATH) ON EXERTION: ICD-10-CM

## 2022-09-12 DIAGNOSIS — I50.9 CONGESTIVE HEART FAILURE, UNSPECIFIED HF CHRONICITY, UNSPECIFIED HEART FAILURE TYPE (HCC): ICD-10-CM

## 2022-09-12 DIAGNOSIS — I50.32 CHRONIC DIASTOLIC HEART FAILURE (HCC): ICD-10-CM

## 2022-09-12 DIAGNOSIS — I10 ESSENTIAL HYPERTENSION: ICD-10-CM

## 2022-09-12 DIAGNOSIS — I47.29 NSVT (NONSUSTAINED VENTRICULAR TACHYCARDIA): ICD-10-CM

## 2022-09-12 LAB
LV EF: 55 %
LVEF MODALITY: NORMAL

## 2022-09-12 PROCEDURE — 93306 TTE W/DOPPLER COMPLETE: CPT

## 2022-09-13 RX ORDER — METOPROLOL TARTRATE 50 MG/1
TABLET, FILM COATED ORAL
Qty: 270 TABLET | Refills: 3 | Status: ON HOLD | OUTPATIENT
Start: 2022-09-13 | End: 2022-09-19 | Stop reason: ALTCHOICE

## 2022-09-15 NOTE — TELEPHONE ENCOUNTER
Procedure: Battery change-  dual pace [facility]  Date: 09.19.2022  Arrival Time: 6am    Meds to Hold: Eliquis 1 day prior      Dr. Liseth Roche-  please see medications, do you agree?

## 2022-09-16 ENCOUNTER — PREP FOR PROCEDURE (OUTPATIENT)
Dept: CARDIOLOGY | Age: 82
End: 2022-09-16

## 2022-09-16 RX ORDER — SODIUM CHLORIDE 9 MG/ML
INJECTION, SOLUTION INTRAVENOUS PRN
Status: CANCELLED | OUTPATIENT
Start: 2022-09-16

## 2022-09-16 RX ORDER — SODIUM CHLORIDE 0.9 % (FLUSH) 0.9 %
5-40 SYRINGE (ML) INJECTION PRN
Status: CANCELLED | OUTPATIENT
Start: 2022-09-16

## 2022-09-16 RX ORDER — SODIUM CHLORIDE 0.9 % (FLUSH) 0.9 %
5-40 SYRINGE (ML) INJECTION EVERY 12 HOURS SCHEDULED
Status: CANCELLED | OUTPATIENT
Start: 2022-09-16

## 2022-09-16 RX ORDER — SODIUM CHLORIDE 9 MG/ML
INJECTION, SOLUTION INTRAVENOUS CONTINUOUS
Status: CANCELLED | OUTPATIENT
Start: 2022-09-16

## 2022-09-19 ENCOUNTER — HOSPITAL ENCOUNTER (OUTPATIENT)
Dept: INPATIENT UNIT | Age: 82
Discharge: HOME | End: 2022-09-19
Attending: INTERNAL MEDICINE | Admitting: INTERNAL MEDICINE
Payer: MEDICARE

## 2022-09-19 VITALS
SYSTOLIC BLOOD PRESSURE: 106 MMHG | TEMPERATURE: 97.9 F | BODY MASS INDEX: 32.62 KG/M2 | RESPIRATION RATE: 17 BRPM | HEIGHT: 71 IN | HEART RATE: 60 BPM | OXYGEN SATURATION: 96 % | DIASTOLIC BLOOD PRESSURE: 66 MMHG | WEIGHT: 233 LBS

## 2022-09-19 PROBLEM — Z45.010 PACEMAKER GENERATOR END OF LIFE: Status: ACTIVE | Noted: 2022-09-19

## 2022-09-19 LAB
ANION GAP SERPL CALCULATED.3IONS-SCNC: 9 MEQ/L (ref 8–16)
APTT: 34.1 SECONDS (ref 22–38)
BUN BLDV-MCNC: 23 MG/DL (ref 7–22)
CALCIUM SERPL-MCNC: 9.2 MG/DL (ref 8.5–10.5)
CHLORIDE BLD-SCNC: 105 MEQ/L (ref 98–111)
CO2: 28 MEQ/L (ref 23–33)
CREAT SERPL-MCNC: 1.1 MG/DL (ref 0.4–1.2)
ERYTHROCYTE [DISTWIDTH] IN BLOOD BY AUTOMATED COUNT: 14.2 % (ref 11.5–14.5)
ERYTHROCYTE [DISTWIDTH] IN BLOOD BY AUTOMATED COUNT: 46.5 FL (ref 35–45)
GFR SERPL CREATININE-BSD FRML MDRD: 64 ML/MIN/1.73M2
GLUCOSE BLD-MCNC: 102 MG/DL (ref 70–108)
HCT VFR BLD CALC: 37.5 % (ref 42–52)
HEMOGLOBIN: 12.6 GM/DL (ref 14–18)
INR BLD: 1.23 (ref 0.85–1.13)
MCH RBC QN AUTO: 30.3 PG (ref 26–33)
MCHC RBC AUTO-ENTMCNC: 33.6 GM/DL (ref 32.2–35.5)
MCV RBC AUTO: 90.1 FL (ref 80–94)
PLATELET # BLD: 176 THOU/MM3 (ref 130–400)
PMV BLD AUTO: 10.6 FL (ref 9.4–12.4)
POTASSIUM REFLEX MAGNESIUM: 4.4 MEQ/L (ref 3.5–5.2)
RBC # BLD: 4.16 MILL/MM3 (ref 4.7–6.1)
SODIUM BLD-SCNC: 142 MEQ/L (ref 135–145)
WBC # BLD: 5.6 THOU/MM3 (ref 4.8–10.8)

## 2022-09-19 PROCEDURE — 85730 THROMBOPLASTIN TIME PARTIAL: CPT

## 2022-09-19 PROCEDURE — 99152 MOD SED SAME PHYS/QHP 5/>YRS: CPT

## 2022-09-19 PROCEDURE — C1785 PMKR, DUAL, RATE-RESP: HCPCS

## 2022-09-19 PROCEDURE — 33228 REMV&REPLC PM GEN DUAL LEAD: CPT | Performed by: INTERNAL MEDICINE

## 2022-09-19 PROCEDURE — 99153 MOD SED SAME PHYS/QHP EA: CPT

## 2022-09-19 PROCEDURE — 2500000003 HC RX 250 WO HCPCS

## 2022-09-19 PROCEDURE — 6360000002 HC RX W HCPCS

## 2022-09-19 PROCEDURE — 80048 BASIC METABOLIC PNL TOTAL CA: CPT

## 2022-09-19 PROCEDURE — 85027 COMPLETE CBC AUTOMATED: CPT

## 2022-09-19 PROCEDURE — 6360000002 HC RX W HCPCS: Performed by: STUDENT IN AN ORGANIZED HEALTH CARE EDUCATION/TRAINING PROGRAM

## 2022-09-19 PROCEDURE — 36415 COLL VENOUS BLD VENIPUNCTURE: CPT

## 2022-09-19 PROCEDURE — 85610 PROTHROMBIN TIME: CPT

## 2022-09-19 PROCEDURE — 33228 REMV&REPLC PM GEN DUAL LEAD: CPT

## 2022-09-19 PROCEDURE — 2580000003 HC RX 258: Performed by: STUDENT IN AN ORGANIZED HEALTH CARE EDUCATION/TRAINING PROGRAM

## 2022-09-19 RX ORDER — CEPHALEXIN 500 MG/1
500 CAPSULE ORAL 3 TIMES DAILY
Qty: 15 CAPSULE | Refills: 0 | Status: SHIPPED | OUTPATIENT
Start: 2022-09-19 | End: 2022-09-24

## 2022-09-19 RX ORDER — SODIUM CHLORIDE 9 MG/ML
INJECTION, SOLUTION INTRAVENOUS CONTINUOUS
Status: DISCONTINUED | OUTPATIENT
Start: 2022-09-19 | End: 2022-09-19 | Stop reason: HOSPADM

## 2022-09-19 RX ORDER — SODIUM CHLORIDE 0.9 % (FLUSH) 0.9 %
5-40 SYRINGE (ML) INJECTION PRN
Status: DISCONTINUED | OUTPATIENT
Start: 2022-09-19 | End: 2022-09-19 | Stop reason: HOSPADM

## 2022-09-19 RX ORDER — METOPROLOL TARTRATE 50 MG/1
TABLET, FILM COATED ORAL 2 TIMES DAILY
COMMUNITY

## 2022-09-19 RX ORDER — SODIUM CHLORIDE 0.9 % (FLUSH) 0.9 %
5-40 SYRINGE (ML) INJECTION EVERY 12 HOURS SCHEDULED
Status: DISCONTINUED | OUTPATIENT
Start: 2022-09-19 | End: 2022-09-19 | Stop reason: HOSPADM

## 2022-09-19 RX ADMIN — SODIUM CHLORIDE: 9 INJECTION, SOLUTION INTRAVENOUS at 06:49

## 2022-09-19 RX ADMIN — CEFAZOLIN 2000 MG: 10 INJECTION, POWDER, FOR SOLUTION INTRAVENOUS at 07:00

## 2022-09-19 NOTE — H&P
[facility] ()  [address] [place] [id number]  H&P and SEDATION/ANALGESIA     Patient demographics:  Date:   9/19/2022  Patient name: Chris Ortega  YOB: 1940  Sex: male   MRN:   [id number]    Reason for admission or planned procedure:  Pacemaker generator change. Code Status: Full Code    Consent:I have discussed with the patient and/or the patient representative the indication, alternatives, and the possible risks and/or complications of the planned procedure and the anesthesia methods. The patient and/or patient representative appear to understand and agree to proceed. Brief clinical summary:  81/M with a dual chamber (BS) pacemaker implanted in  10/2013 for sinus bradycardia and high grade Av block, electively presents for generator change (DIANNE sine 9/3/22). Normal lead parameters, AP 0% and  100%. Normal LVEF ((55%). Past Medical History:  Past Medical History:   Diagnosis Date    AAA (abdominal aortic aneurysm) ([address] 05/2018    Adenomatous colon polyp 2012    Alzheimer disease ([address]     early stage    Antral gastritis 7/2012    Arthritis     Atrial fibrillation ([address] 2013    AV block, [place] 1 10/31/2013    Blood transfusion reaction     Cancer ([address] 2010    SKIN CANCER NECK    CHF (congestive heart failure) ([address]     COVID-19 06/2022    GERD with stricture 7/2012    Hyperlipidemia 2008    Hypertension 2005    Nausea & vomiting     LOVE on CPAP     Rheumatic fever 1950's    S/P cardiac pacemaker procedure: 10/31/2013: [employer] Dual chamber. 10/31/2013    10/31/2013: [address] Dual chamber.  Dr. Jhonatan Manning     Umbilical hernia 3648    Vitamin B1 deficiency 4/2016       Past Surgical History:  Past Surgical History:   Procedure Laterality Date    ANKLE ARTHROCENTESIS  2001    right    [address]    lumpectomy left    CARDIOVASCULAR STRESS TEST  6-27-13    CARDIOVASCULAR STRESS TEST 6-26-13    HOLTER     COLONOSCOPY  2009    COLONOSCOPY Left 10/22/2019    COLONOSCOPY DIAGNOSTIC performed by Aretha Fam MD at [address], [place]    right inguinal    HERNIA REPAIR  1-27-14    Dr. Es Hughes hernia repair with mesh    MALIGNANT SKIN LESION EXCISION  2011    left side of neck    PACEMAKER INSERTION  10/31/13    PACEMAKER PLACEMENT  10/2013    SKIN BIOPSY  10/9/14    Dr Shyam Jacome    SKIN BIOPSY  4/16/15    Dr Deann Horn  5-    SKIN CANCER EXCISION  11/13/2019    THORACENTESIS  03/10/2019    TONSILLECTOMY  1950    TRANSTHORACIC ECHOCARDIOGRAM  6-28-13    UPPER GASTROINTESTINAL ENDOSCOPY Left 10/22/2019    EGD ESOPHAGOGASTRODUODENOSCOPY performed by Aretha Fam MD at [facility]    VARICOCELECTOMY  1988    right        Allergies: Allergies as of 09/19/2022 - Fully Reviewed 09/19/2022   Allergen Reaction Noted    Amiodarone  01/20/2014    Lamisil [terbinafine] Itching 05/09/2012    Morphine Other (See Comments) 05/09/2012    Nickel Hives 10/24/2013    Adhesive tape Rash 05/09/2012     Additional information:       Medications     Current Facility-Administered Medications:     0.9 % sodium chloride infusion, , IntraVENous, Continuous, Antonella Sharma PA-C, Last Rate: 50 mL/hr at 09/19/22 0649, New Bag at 09/19/22 0649    sodium chloride flush 0.9 % injection 5-40 mL, 5-40 mL, IntraVENous, 2 times per day, Antonella Sharma PA-C    sodium chloride flush 0.9 % injection 5-40 mL, 5-40 mL, IntraVENous, PRN, Antonella Sharma PA-C    ceFAZolin (ANCEF) 2000 mg in dextrose 5 % 50 mL IVPB, 2,000 mg, IntraVENous, On Call to OR, Antonella Sharma PA-C, Last Rate: 100 mL/hr at 09/19/22 0700, 2,000 mg at 09/19/22 0700    vancomycin (VANCOCIN) 250 mg in sodium chloride 0.9 % 250 mL irrigation, 250 mg, Irrigation, Once, Jr Michaud MD  Prior to Admission medications    Medication Sig Start Date End Date Taking?  Authorizing Provider   metoprolol tartrate (LOPRESSOR) 50 MG tablet TAKE ONE AND ONE-HALF TABLETS TWICE A DAY  Patient taking differently: TAKE ONE IN AM AND 2 AT HS 9/13/22   Renee Soliman MD   furosemide (LASIX) 40 MG tablet TAKE 1 TABLET DAILY 8/10/22   Carlos MD Columba   simvastatin (ZOCOR) 10 MG tablet TAKE 1 TABLET NIGHTLY 7/18/22   Carlos MD Columba   apixaban (ELIQUIS) 5 MG TABS tablet TAKE 1 TABLET TWICE A DAY 7/13/22   Oralee ShirJULIET gannon CNP   omeprazole (PRILOSEC) 40 MG delayed release capsule TAKE 1 CAPSULE DAILY 6/20/22   Gary Hoffman MD   potassium chloride (KLOR-CON M) 10 MEQ extended release tablet TAKE 1 TABLET DAILY 2/18/22   Oralee ShirJULIET gannon CNP   amLODIPine (NORVASC) 10 MG tablet TAKE 1 TABLET DAILY 12/14/21   Oralee ShirJULIET gannon - CNP   doxepin (SINEQUAN) 25 MG capsule TAKE 1 OR 2 CAPSULES AT NIGHT FOR INSOMNIA 11/22/21   Irineo See PA-C   Apoaequorin (PREVAGEN EXTRA STRENGTH) 20 MG CAPS Take 1 capsule by mouth daily 7/31/20   Gary Hoffman MD   amoxicillin (AMOXIL) 500 MG capsule Take 500 mg by mouth 3 times daily    Historical Provider, MD   ciclopirox (LOPROX) 0.77 % cream Apply topically daily as needed Apply topically 2 times daily. Historical Provider, MD   aspirin EC 81 MG EC tablet Take 1 tablet by mouth daily. 6/25/13   Carlos Waterman MD   econazole nitrate 1 % cream Apply  topically 2 times daily. Apply topically daily. Historical Provider, MD   calcium-mag-vit D - MG-MG-UNIT TB24 Take 1 tablet by mouth Daily. 5/9/12   Gary Hoffman MD     Aspirin  [] 81 mg  [] 325 mg  [x] None  Antiplatelet drug therapy use last 5 days  [x]No []Yes  Coumadin Use Last 5 Days [x]No []Yes  Other anticoagulant use last 5 days  []No [x]Yes  Additional information: Per medical records       Vitals: There were no vitals filed for this visit. Physical Examination:   GENERAL: Alert and oriented. No distress. EYES: No pallor or icterus.   ENT: No central cyanosis. VESSELS: No jugular venous distension or carotid bruits. HEART: Normal S1/S2. No murmur, rub or gallop. LUNGS: Clear to auscultation. CHEST WALL: No erosions, discharge or swelling at device site. ABDOMEN: Soft and non-tender. EXTREMITIES: No lower extremity edema. Feet are warm. NEUROLOGICAL: Grossly intact. Procedure Plannd:   [] AF ablation [] Atrial Flutter ablation [] SVT ablation [] PVC/VT ablation [] EP study  [] Pacemaker implantation [] AICD implantation [] BiV PM/ICD implantation   [x] Generator change [] Loop recorder implantation [] Loop recorder explantation. [] Micra leadless pacemaker implantation. [] His bundle/Left bundle pacemaker implantation. [] Lead revision. [] Pocket Revision. [] STEVO. [] Cardioversion    []Other:       Planned Sedation/Analgesia:  [] General anesthesia. [x] Midazolam [x] Fentanyl [] Propofol [] Propofol with anesthesia team.    []Midazolam []Meperidine []Sublimaze []Morphine [] Diazepam    []Other:       ASA Classification  []1 []2 [x]3 []4 []5  Class 1: A normal healthy patient  Class 2: Pt with mild to moderate systemic disease  Class 3: Severe systemic disease or disturbance  Class 4: Severe systemic disorders that are already life threatening. Class 5: Moribund pt with little chances of survival, for more than 24 hours. Mallampati Airway Classification:   []1 []2 [x]3 []4    [x]Pre-procedure diagnostic studies complete and results available. Comment:    [x]Previous sedation/anesthesia experiences assessed. Comment:    [x]The patient is an appropriate candidate to undergo the planned procedure sedation and anesthesia. (Refer to nursing sedation/analgesia documentation record)  [x]Formulation and discussion of sedation/procedure plan, risks, and expectations with patient and/or responsible adult completed. [x]Patient examined immediately prior to the procedure.  (Refer to nursing sedation/analgesia documentation record)        Shobha Hunt MD MD Rock Manuel Arzate, [facility]  Electronically signed 9/19/2022 at 7:16 AM

## 2022-09-19 NOTE — FLOWSHEET NOTE
Return to room post generator replacement   Awake and alert   Dr Campbell Willson in to see pt and wife

## 2022-09-19 NOTE — PROGRESS NOTES
0600  Pt admitted to  2E17 ambulatory for pacemaker generator replacement . Pt NPO. Patient accompanied by spouse. Vital signs obtained. Assessment and data collection initiated. Oriented to room. Policies and procedures for 2E explained   All questions answered with no further questions at this time. Fall prevention and safety precautions discussed with patient. Care plan reviewed with patient and family.   Patient and family verbalize understanding of the plan of care and contribute to goal setting.     8332  Dr Radha Solorio in to see pt and spouse   To procedure per bed

## 2022-09-19 NOTE — PROCEDURES
[address] [facility]  Dept: [phone]  ELECTROPHYSIOLOGY PROCEDURE NOTE  Patients Demographics:  Date:   9/19/2022  Patient name:              Morgan Freitas  YOB: 1940  Sex:    male   MRN:   [id number]    Cardial Electrophysiologist:   Sanjeev Guthrie MD, MRCP, [place], [place]    Primary Care Provider:    Selene Jay MD     Cardiologist:  Phillip Umana MD    Procedure Planned:  Pacemaker pulse generator change. Indication/s of the Procedure:   Pacemaker pulse generator at Elective Replacement Indicator. Brief Clinical Summary:   81/M with a dual chamber (BS) pacemaker implanted in  10/2013 for sinus bradycardia and high grade Av block (now has chronic AF), electively presents for generator change (DIANNE sine 9/3/22). Normal lead parameters, AP 0% and  100%. Normal LVEF ((55%). Procedure/s Performed  Pacemaker pulse generator change. Description of the Procedure: The patient was brought to the electrophysiology lab in a fasting and non-sedated state. The device was interrogated. The underlying was atrial fibrillation and complete AV The device programmed as VOO 60 PPM. The patient was prepped and draped in a usual sterile fashion. Intravenous Fentanyl and Midazolam was used as needed for conscious sedation. The skin over the pulse generator was liberally infiltrated with 2% lidocaine for local anesthesia. A 4 cm long transverse incision was made over incision scar through the skin and subcutaneous tissue. Electrocautery was used to expose the pulse generator and to maintain good hemostasis. The pulse generator was explanted from the pocket. The leads were carefully freed from adhesions and inspected for insulation breaks. The bleeding was controlled by electric cautery. After disconnecting from the old pulse generator, the leads were checked with an external pacing system analyzer.  The leads were connected to the new pulse generator. The pocket was thoroughly irrigated with antibiotic solution. The leads were then wrapped carefully behind the generator and then placed in the pocket. Hemostasis was assured one last time, and the pocket was closed in 3 layers using Vicryl sutures. The incision was covered with steri-strips and sterile light dressing applied. The patient's hemodynamics including heart rate, blood pressure and oxygen saturation was monitored throughout the procedure. The patient tolerated the procedure well. Medications:  Midazolam 2 mg intravenously. Fentanyl 100 mcg intravenously. Cefazolin 2 gm intravenously for surgical prophylaxis. Lidocaine 2%, 20 cc ID/SQ for local anaesthesia. Fluoroscopy Time:  Zero. Blood Loss:  Minimal.    Complications:  None. Device Details:  Explanted pulse generator (10/31/2013): [address] and serial [id number]. Implanted pulse generator: [employer], model [id number] and serial [id number]. Right atrial lead (10/31/2013): [employer], model [id number] and serial [id number]. Right ventricular lead (10/31/201): [employer], model [id number] and serial [id number]. Intraoperative Electric Parameters:  RA lead: AF  RV lead: [email or web address] [email or web address]. Bradycardia Settings  VVIR  PPM.     Summary:  Successful Pacemaker pulse generator change. Normal pacemaker funnctions. Chronic atrial fibrillation. Recommendations:    Observation for 1 hour. Cephalexin 500 mg every 8 hours for 5 days. Follow up in 1 week in device clinic.        Electronically signed by Severiano Emperor, MD, Yesenia Bass on 9/19/2022 at 9:25 AM

## 2022-09-19 NOTE — BRIEF OP NOTE
Brief Postoperative Note    Date:   9/19/2022  Patient name: Destini Streeter  YOB: 1940  Sex: male   MRN:   [id number]    PCP: Gordy Liu MD     Procedure: Pacemaker generator change. Pre-Op Diagnosis: Pacemaker generator at DIANNE. Post-Op Diagnosis: Same    Surgeon[facility] Kevyn Vee MD, [facility], [facility], [address]    Assistant: Devon Khan Anesthesia/sedation:  Local lidocaine/fentanyl and midazolam as needed. Estimated Blood Loss (mL): Minimal    Complications: None    Recommendations:  See Epic orders. Bed rest for 2 hours. Keep pocket site dry. No shower for 7 days ((sponge bath and tub bath OK). ICE packs locally. Monitor site for bleeding or swelling. Pressure dressing x 48 hours. Follow up in device clinic in 1 week.        Electronically signed by Kylie Brody MD, Whitney Homans on 9/19/2022 at 9:21 AM

## 2022-09-19 NOTE — DISCHARGE INSTRUCTIONS
Activity     You should gradually return to your normal activities. For the first 2 weeks:         Do not lift more than 10 pounds         Do not swim, golf, bowl or vacuum          At your two-week follow-up visit, ask your doctor which  of the above activities you can resume         ALWAYS avoid any contact sports or hard blows to the chest or abdomen           Wound care      May use ice at 15 minute intervals if needed for comfort       Remove pressure dressing in 48 hours, remove medipore dressing in 72 hours,  leave steri strips on . Keep your incision dry for at least 7 days, and then you may shower. Sponge bathes around the device insertion site for the first week, keeping the incision dry. Do not apply any ointment, talc, or lotion to the incision. Do not scratch, rub or touch the incision with your hands     The steri-strips (tape strips) will be removed at your follow-up visit, if they have not yet peeled off on their own     Call your doctor immediately if your incision looks re, becomes swollen or tender, or begins to ooze fluid. Also, notify your doctor at once if you develop A temperature  greater than 100 degrees Fahrenheit. You must avoid:                Airport magnet security wants                Diathermy or other heart treatments                Arc or resistance welding                Electrical power generator plants                Electric cautery that is used for surgical procedures                MRI scans                Radio or television transmitting towers    Special Precautions: You must stay at arm's length from magnets of any kind       Cellular phones should be used on the ear opposite to your device. Do not keep cellular phones in a pocket over your defibrillator.          To avoid any interference with your device, you must pass through any security devices or gruber (airports, department stores, and other public places) within               10 seconds. Microwave ovens, televisions, electric tools, and gardening equipment should not cause problems. If you have any questions about equipment not listed, call the  of your device or the Pacemaker/ICD clinic at [address] [place] [address]. General instructions      Always carry your device identification card with you. The device   will mail a permanent  identification card to you in 6-8 weeks. Keep your follow-up appointment with your doctor and the device clinic to be sure your device and the leads are working properly. Always take your medications at the times prescribed by your doctor. Always tell  medical personnel that you have a device. Memorize the name of the [address] [place] [address], [address]       Follow-up appointments:     Make a follow-up appointment with you doctor 1-2 weeks after your implantation. If you have any problems or questions about your device, call the Pacemaker/ICD Clinic [phone]     Clinic hours Monday through Friday 8 am to 4 pm  IN CASE OF AN EMERGENCY, CALL 911               Pacemaker Placement: What to Expect at [address] [place] [address]     Pacemaker placement is surgery to put a pacemaker in your chest. This surgery may be done if you have bradycardia (a slow heart rate). Your doctor made a cut (incision) in your chest. The doctor put the pacemaker leads through the cut, into a large blood vessel, then into the heart. The doctor put the pacemaker under the skin of your chest and attached the leads to it. Your chest may be sore where the doctor made the cut. You also may have a bruise and mild swelling. These symptoms usually get better in 1 to 2 weeks. You may feel a hard ridge along the incision. This usually gets softer in the months after surgery. You may be able to see or feel the outline of the pacemaker under your skin. You may be able to go back to work or your usual routine 1 to 2 weeks after surgery. may be done from your home. Your doctor will give you instructions about how to do this. Follow-up care is a key part of your treatment and safety. Be sure to make and go to all appointments, and call your doctor if you are having problems. It's also a good idea to know your test results and keep a list of the medicines you take. When should you call for help? Call 911 anytime you think you may need emergency care. For example, call if:    You passed out (lost consciousness). You have trouble breathing. Call your doctor now or seek immediate medical care if:    You are dizzy or light-headed, or you feel like you may faint. You have pain that does not get better after you take pain medicine. You hear an alarm or feel a vibration from your pacemaker. You have loose stitches, or your incision comes open. Bright red blood has soaked through the bandage over your incision. You have signs of infection, such as: Increased pain, swelling, warmth, or redness. Red streaks leading from the incision. Pus draining from the incision. A fever. You have hiccups often or for a long time. Watch closely for changes in your health, and be sure to contact your doctor if:    You have any problems with your pacemaker. Where can you learn more? Go to https://Pyreg.[employer]. org and sign in to your [employer] account. Enter G550 in the [facility] box to learn more about \"Pacemaker Placement: What to Expect at Home. \"     If you do not have an account, please click on the \"Sign Up Now\" link. Current as of: January 10, 2022               Content Version: 13.4  © 2951-0913 Healthwise, Incorporated. Care instructions adapted under license by [facility] ([facility]). If you have questions about a medical condition or this instruction, always ask your healthcare professional. [address] any warranty or liability for your use of this information.

## 2022-09-30 ENCOUNTER — NURSE ONLY (OUTPATIENT)
Dept: CARDIOLOGY CLINIC | Age: 82
End: 2022-09-30
Payer: MEDICARE

## 2022-09-30 DIAGNOSIS — Z95.0 PACEMAKER: Primary | ICD-10-CM

## 2022-09-30 PROCEDURE — 93280 PM DEVICE PROGR EVAL DUAL: CPT | Performed by: INTERNAL MEDICINE

## 2022-09-30 NOTE — PROGRESS NOTES
In [employer] Dual Pacemaker --getting Latitude at home  Patient of Roula Perez change 9/19/22    Steri strips already removed. Small amount of drainage noted by wife after steristrips came off a couple days ago. No current drainage. Incision continuing to heal. Incision cleaned. Picture taken. New steri-strips applied.   If, at any point, there is any increased redness, swelling, increased discomfort, fever, or the incision opens up, the patient is aware to go to the emergency room     Battery 9 years    Presenting rhythm   Underlying dependent at 30    A Impedance 657  RV Impedance 497    P wave sensing -  R wave sensing -    A Threshold - @ -  RV Thresholds 2.1 @ 0.40      A Paced -%  V Paced 99%    Programmed Mode VVIR       Afib [place] 0%    Episodes   none

## 2022-10-03 ENCOUNTER — TELEPHONE (OUTPATIENT)
Dept: CARDIOLOGY CLINIC | Age: 82
End: 2022-10-03

## 2022-10-03 NOTE — TELEPHONE ENCOUNTER
Pt wife called office wondering if patient get start shooting his shot gun again. Generator was changed out 9/19/22.

## 2022-10-03 NOTE — TELEPHONE ENCOUNTER
Pt called back and he said he will hold the gun on his rt shoulder and not his left shoulder so he is asking if it is ok to shoot the gun with his recent gen change only ? ??

## 2022-10-18 ENCOUNTER — OFFICE VISIT (OUTPATIENT)
Dept: CARDIOLOGY CLINIC | Age: 82
End: 2022-10-18
Payer: MEDICARE

## 2022-10-18 VITALS
SYSTOLIC BLOOD PRESSURE: 111 MMHG | HEART RATE: 60 BPM | DIASTOLIC BLOOD PRESSURE: 69 MMHG | WEIGHT: 246.2 LBS | BODY MASS INDEX: 34.47 KG/M2 | HEIGHT: 71 IN

## 2022-10-18 DIAGNOSIS — I10 ESSENTIAL HYPERTENSION: ICD-10-CM

## 2022-10-18 DIAGNOSIS — I48.11 LONGSTANDING PERSISTENT ATRIAL FIBRILLATION (HCC): ICD-10-CM

## 2022-10-18 DIAGNOSIS — I47.29 NSVT (NONSUSTAINED VENTRICULAR TACHYCARDIA): ICD-10-CM

## 2022-10-18 DIAGNOSIS — Z95.0 HISTORY OF CARDIAC PACEMAKER IN SITU: ICD-10-CM

## 2022-10-18 DIAGNOSIS — R06.02 SOB (SHORTNESS OF BREATH) ON EXERTION: ICD-10-CM

## 2022-10-18 DIAGNOSIS — I50.32 CHRONIC DIASTOLIC HEART FAILURE (HCC): Primary | ICD-10-CM

## 2022-10-18 DIAGNOSIS — R42 DIZZINESS: ICD-10-CM

## 2022-10-18 DIAGNOSIS — E78.00 PURE HYPERCHOLESTEROLEMIA: ICD-10-CM

## 2022-10-18 PROBLEM — Z45.010 PACEMAKER GENERATOR END OF LIFE: Status: RESOLVED | Noted: 2022-09-19 | Resolved: 2022-10-18

## 2022-10-18 PROCEDURE — 99214 OFFICE O/P EST MOD 30 MIN: CPT | Performed by: INTERNAL MEDICINE

## 2022-10-18 PROCEDURE — 1123F ACP DISCUSS/DSCN MKR DOCD: CPT | Performed by: INTERNAL MEDICINE

## 2022-10-18 NOTE — PROGRESS NOTES
Chief Complaint   Patient presents with    6 Month Follow-Up    Congestive Heart Failure    Atrial Fibrillation   Originally Patient her to establish cardiologist. He was going to have umbilical hernia repair with Dr. Iris Mustafa yesterday but it was canceled due to new onset A-fib on EKG. Was admitted at [facility] for chf ex and RT pleural effusion and had thoracenthesis over 2 liter and doubled lasix and sent home march 2019          6 month follow up. EKG done 2-15-22. Occasional dizziness on standing    No wt gain     Denies cp, or palpitation  or edema    NO wt gain    Leg edema better with compression stocking    Sob on exertion- chronic      Had  Pacer in oCt 31 2013    Could not tolerate the amiodarone( shaky, destressed and constipated ) and stopped- all resolved and feel good    He is back to AFIb few days after Cardioversion    Pacer site looks good    Patient Seen, Chart, Consults notes, Labs, Radiology studies reviewed.        Patient Active Problem List   Diagnosis    Rheumatic fever, 1950's    Class 1 obesity    Tobacco dependence in remission-quit 1986    Impotence    Gastritis    Gastroesophageal reflux disease    Umbilical hernia    Daytime somnolence    Restless legs    Dizziness- intermittent for the last few months new    History of cardiac pacemaker in situ    Chronic diastolic heart failure (HCC)    Diastolic CHF, chronic (HCC)    Obstructive sleep apnea syndrome    Essential hypertension    Pure hypercholesterolemia    Abdominal aortic aneurysm    SOB (shortness of breath) on exertion    NSVT (nonsustained ventricular tachycardia) ([address] on B-B and lab okay    Longstanding persistent atrial fibrillation [facility])       Past Surgical History:   Procedure Laterality Date    ANKLE ARTHROCENTESIS  2001    right    [address]    lumpectomy left    CARDIOVASCULAR STRESS TEST  6-27-13    CARDIOVASCULAR STRESS TEST  6-26-13    HOLTER     COLONOSCOPY  2009    COLONOSCOPY Left 10/22/2019    COLONOSCOPY DIAGNOSTIC performed by Carlos Brown MD at [address]    right inguinal    HERNIA REPAIR  14    Dr. Mata Shelter hernia repair with mesh    MALIGNANT SKIN LESION EXCISION      left side of neck    PACEMAKER INSERTION  10/31/13    PACEMAKER PLACEMENT  10/2013    SKIN BIOPSY  10/9/14    Dr Alethea De La Cruz  4/16/15    Dr Shea Del Valle    SKIN CANCER DESTRUCTION  2015    SKIN CANCER EXCISION  2019    THORACENTESIS  03/10/2019    TONSILLECTOMY  1950    TRANSTHORACIC ECHOCARDIOGRAM  13    UPPER GASTROINTESTINAL ENDOSCOPY Left 10/22/2019    EGD ESOPHAGOGASTRODUODENOSCOPY performed by Carlos Brown MD at [facility]    VARICOCELECTOMY  1988    right       Allergies   Allergen Reactions    Amiodarone      DEPRESSION, CONSTIPATION    Lamisil [Terbinafine] Itching    Morphine Other (See Comments)     hallucinations    Nickel Hives    Adhesive Tape Rash        Family History   Problem Relation Age of Onset    Dementia Mother     Cancer Mother         breast        Social History     Socioeconomic History    Marital status:      Spouse name: Not on file    Number of children: Not on file    Years of education: Not on file    Highest education level: Not on file   Occupational History    Occupation: retired   Tobacco Use    Smoking status: Former     Packs/day: 1.00     Years: 20.00     Pack years: 20.00     Types: Cigarettes     Quit date: 1986     Years since quittin.8    Smokeless tobacco: Former     Types: Chew   Vaping Use    Vaping Use: Never used   Substance and Sexual Activity    Alcohol use:  Yes     Alcohol/week: 0.0 standard drinks     Comment: USALLY 1 DRINK DAILY    Drug use: No    Sexual activity: Yes     Partners: Female   Other Topics Concern    Not on file   Social History Narrative    Not on file     Social Determinants of Health     Financial Resource Strain: Low Risk     Difficulty of Paying Living Expenses: Not hard at all   Food Insecurity: No Food Insecurity    Worried About Running Out of Food in the Last Year: Never true    Ran Out of Food in the Last Year: Never true   Transportation Needs: Not on file   Physical Activity: Insufficiently Active    Days of Exercise per Week: 3 days    Minutes of Exercise per Session: 30 min   Stress: Not on file   Social Connections: Not on file   Intimate Partner Violence: Not on file   Housing Stability: Not on file       Current Outpatient Medications   Medication Sig Dispense Refill    metoprolol tartrate (LOPRESSOR) 50 MG tablet Take by mouth 2 times daily 50mg in am and 100mg in pm      furosemide (LASIX) 40 MG tablet TAKE 1 TABLET DAILY 90 tablet 3    simvastatin (ZOCOR) 10 MG tablet TAKE 1 TABLET NIGHTLY 90 tablet 3    apixaban (ELIQUIS) 5 MG TABS tablet TAKE 1 TABLET TWICE A  tablet 3    omeprazole (PRILOSEC) 40 MG delayed release capsule TAKE 1 CAPSULE DAILY 90 capsule 3    potassium chloride (KLOR-CON M) 10 MEQ extended release tablet TAKE 1 TABLET DAILY 180 tablet 1    amLODIPine (NORVASC) 10 MG tablet TAKE 1 TABLET DAILY 90 tablet 3    doxepin (SINEQUAN) 25 MG capsule TAKE 1 OR 2 CAPSULES AT NIGHT FOR INSOMNIA 180 capsule 3    Apoaequorin (PREVAGEN EXTRA STRENGTH) 20 MG CAPS Take 1 capsule by mouth daily      ciclopirox (LOPROX) 0.77 % cream Apply topically daily as needed Apply topically 2 times daily. aspirin EC 81 MG EC tablet Take 1 tablet by mouth daily. 30 tablet 3    econazole nitrate 1 % cream Apply  topically 2 times daily. Apply topically daily. calcium-mag-vit D - MG-MG-UNIT TB24 Take 1 tablet by mouth Daily. No current facility-administered medications for this visit. Review of Systems -     General ROS: negative  Psychological ROS: negative  Hematological and Lymphatic ROS: No history of blood clots or bleeding disorder.    Respiratory ROS: no cough, shortness of breath, or wheezing  Cardiovascular ROS: no chest pain or dyspnea on exertion  Gastrointestinal ROS: negative  Genito-Urinary ROS: no dysuria, trouble voiding, or hematuria  Musculoskeletal ROS: negative  Neurological ROS: no TIA or stroke symptoms  Dermatological ROS: negative      Blood pressure 111/69, pulse 60, height 5' 11\" (1.803 m), weight 246 lb 3.2 oz (111.7 kg). Physical Examination:    General appearance - alert, well appearing, and in no distress  Mental status - alert, oriented to person, place, and time  Neck - supple, no significant adenopathy, no JVD, or carotid bruits  Chest - clear to auscultation, no wheezes, rales or rhonchi, symmetric air entry  Heart - normal rate, regular rhythm, normal S1, S2, no murmurs, rubs, clicks or gallops  Abdomen - soft, nontender, nondistended, no masses or organomegaly  Neurological - alert, oriented, normal speech, no focal findings or movement disorder noted  Musculoskeletal - no joint tenderness, deformity or swelling  Extremities - peripheral pulses normal, no pedal edema, no clubbing or cyanosis  Skin - normal coloration and turgor, no rashes, no suspicious skin lesions noted    Lab  No results for input(s): CKTOTAL, CKMB, CKMBINDEX, TROPONINI in the last 72 hours.   CBC:   Lab Results   Component Value Date/Time    WBC 5.6 09/19/2022 06:28 AM    RBC 4.16 09/19/2022 06:28 AM    RBC 4.63 04/09/2016 08:45 AM    HGB 12.6 09/19/2022 06:28 AM    HCT 37.5 09/19/2022 06:28 AM    MCV 90.1 09/19/2022 06:28 AM    MCH 30.3 09/19/2022 06:28 AM    MCHC 33.6 09/19/2022 06:28 AM    RDW 14.1 04/09/2016 08:45 AM     09/19/2022 06:28 AM    MPV 10.6 09/19/2022 06:28 AM     BMP:    Lab Results   Component Value Date/Time     09/19/2022 06:28 AM    K 4.4 09/19/2022 06:28 AM     09/19/2022 06:28 AM    CO2 28 09/19/2022 06:28 AM    BUN 23 09/19/2022 06:28 AM    LABALBU 4.4 07/06/2022 08:18 AM    CREATININE 1.1 09/19/2022 06:28 AM    CALCIUM 9.2 09/19/2022 06:28 AM    LABGLOM 64 09/19/2022 06:28 AM GLUCOSE 102 09/19/2022 06:28 AM    GLUCOSE 93 07/06/2022 08:18 AM     Hepatic Function Panel:    Lab Results   Component Value Date/Time    ALKPHOS 88 07/06/2022 08:18 AM    ALKPHOS 83 06/03/2014 12:00 AM     Magnesium:    Lab Results   Component Value Date/Time    MG 2.2 07/06/2022 08:18 AM     Warfarin PT/INR:    No components found for: PTPATWAR,  PTINRWAR  HgBA1c:    No results found for: LABA1C  FLP:    No components found for: CHLPL,  TRIG,  HDL,  LDLCALC,  LDLDIRECT,  LABVLDL  TSH:    Lab Results   Component Value Date/Time    TSH 1.600 04/09/2016 08:45 AM       EKG 6/25/13  Atrial fibrillation   -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 ABNORMAL   holter CONCLUSION:   1. The predominant rhythm appears to be atrial fibrillation with   average rate of 50, maximum is 95, minimal of 26.   2. The patient has 2.2 second and 2.8 second pauses. This pattern   does seem to favor sick sinus syndrome. May need serial   monitoring if clinically indicated. 3. The patient appears to be in atrial fibrillation throughout the   recording. Anticoagulation may be recommended if no   contraindication and if clinically indicated. 4. There are some infrequent premature ventricular contractions. Couplets also noted on the study. 5. This is an abnormal Holter. Clinical correlation and follow up if   clinically indicated suggested. 6. One may want to consider serial follow up on this patient because   of the possible sick sinus syndrome features of the Holter monitor. Holter after CPAP repeated - NO changes actually had 3.8 sec pause- got worse  CONCLUSION: This is an abnormal Holter monitor finding with 100% atrial   fibrillation, average heart rate 46 beats per minute ranging from 25 to 89   beats per minute. There is a pause of 3.8 seconds at 5 a.m. in the morning. Probably the patient was asleep at that time.  Minimum heart rate of 25 beats per minute was also at 5 a.m. in the morning. It is abnormal for frequent ventricular ectopic beats. At this time, the patient is completely asymptomatic at least from the diary. RECOMMENDATION: Needs further clinical correlation. Consider evaluation of   this patient. If the patient needs to be on any beta-blocker, any calcium   channel blocker, or any rate reducing medication, the patient may need a   pacemaker. If the patient has any significant symptoms or any symptomatology, suggestive   consideration should be given for pacemaker placement. The patient has significant bradyarrhythmia. The baseline is bradycardic and   also he has a significant cause of 3.8 seconds. However if the patient is   asymptomatic and in view of the background atrial fibrillation, we will   continue with observation and close follow up. [place] M.D.     EKG: Atrial fibrillation-slow ventricular response rate of 36 BPM-Nonspecific QRS widening and anterior fascicular block.    -Anteroseptal infarct -age undetermined. ABNORMAL     Sleep study _moderate LOVE obstructive AHI of 19    EKG:-V paced rhythm     EKH 9/30/14-Electronic ventricular pacemaker underlying afib    EKG afib with V pacing      Small to moderate right-sided pleural effusion is demonstrated along with right basilar consolidative airspace disease which may represent atelectasis or pneumonia. However, recommend short-term follow-up to document resolution. This report has been created using voice recognition software. It may contain minor errors which are inherent in voice recognition technology. Final report electronically signed by Dr. Nava Vega on 4/8/2019 11:52 AM         Persistence of a small to moderate right-sided pleural effusion with right basilar opacities which may represent atelectasis or infiltrate.  Oral stable appearance of the chest when compared to the previous exam.           This report has been created using voice recognition software. It may contain minor errors which are inherent in voice recognition technology. Final report electronically signed by Dr Paco Hogue on 6/4/2019 1:05 PM     There is an infrarenal abdominal aortic aneurysm measuring 3.6 x 3.3 cm. Previously this measured 3.2 x 3.1 cm on Friday, 5/10/2018. 2. Mild bilateral common iliac artery ectasia is noted. These measure 1.6 cm in diameter bilaterally. This report has been created using voice recognition software. It may contain minor errors which are inherent in voice recognition technology. Final report electronically signed by Dr. Fiona Diaz on 5/19/2021 4:50 PM             EKG 9/30/19  Electronic ventricular pacemaker   Pacemaker ECG, No further analysis   INSUFFICIENT DATA      Echo  Conclusions      Summary   Left ventricle size is normal.   Mildly increased left ventricle wall thickness. Systolic function was normal.   There were no regional wall motion abnormalities. Ejection fraction is visually estimated at 55%. The left atrium is Severely dilated. Mild-to-moderate mitral stenosis. The peak mitral valve velocity was 2.2 m/s, peak gradient was 18 mmHg, and   the mean gradient was 6 mmHg. Right ventricular systolic pressure measures 55 mmhg. Signature      ----------------------------------------------------------------   Electronically signed by Aranza Caraballo MD (Interpreting   physician) on 06/15/2020 at 06:56 PM    Assessment  B/L PPP edema trace to +1 CONT LASIX    Pacemaker dependant under battery wtach     Diagnosis Orders   1. Chronic diastolic heart failure ([address]        2. Longstanding persistent atrial fibrillation ([address]        3. Essential hypertension        4. Pure hypercholesterolemia        5. Dizziness- intermittent for the last few months new        6. NSVT (nonsustained ventricular tachycardia) (HCC) on B-B and lab okay        7.  SOB (shortness of breath) on exertion 8. History of cardiac pacemaker in situ                Previous admission DX  ASSESSMENT:   1. Acute diastolic congestive heart failure. The patient had   significant shortness of breath and basically having chronic   shortness of breath was not addressed and basically we will treat   him for that. 2. Status post pacemaker placement for sick sinus syndrome with slow   ventricular response, pause of 3.8 and complete heart block noted   in the cath lab while performing the procedure on A-pacing and no   V-capturing. There is no conduction. No capturing. On A-pacing,   there is no conduction to ventricle. 3. Status post pacemaker placement, [employer] and pacemaker   has been interrogated. Interrogation was functionally normal.   4. Hypertension. 5. Hyperlipidemia. 6. Obstructive sleep apnea on C-PAP. 7. Obesity. 8. History of erectile dysfunction. 9. History of dizziness, generalized body weakness, easy   fatigability, feeling sleep probably related to the sick sinus   syndrome and AV with slow ventricular response. That has been   Resolved. nUC STRESS TEST NET jUNE 2013      Plan     The most   current meds and labs reviewed    Hypertension, on medical treatment. Seems to be under good control. Patient is compliant with medical treatment. SOB ON EXERTION   NEED ISCHEMIA WORK UP  DECLINED echo and STRESS TEST AND WANT TO CONT   INFORMED TO COME OR CALL IF CHANGE HIS MIND    Congestive heart failure: no evidence of fluid overload today, no recent hospitalization for CHF  Cont  Lasix 40 mg po qd  CXR-4/8/19  Weight measurment   Call if wt gain > 5 lb in 1 weeks  Lab from April 2021 WNL  ReviewedReviewed    Hypertension, on medical treatment. Seems to be under good control. Patient is compliant with medical treatment.    HTN need better control  Off  avapro  300 mg po qd for dizziness  Cont  Norvasc 10 po qd  Pacer interrogation  April 2021  KNOWN AFIB /ELIQUIS   NSVT longest 11 sec composed 11 beat at rate 177 bpm    NXT [employer] DUAL PACEMAKER REMOTE   Sept 30m, 2022 WNL       Hyperlipidemia: on statins, followed periodically. Patient need periodic lipid and liver profile. Persistent atr fib  Cont  Asa 81 mg po QD  atr fib  Cont-apixaban 5 mg po BID    Discussed the risk and benefit of OA and he want to cont with meds    Off  amiodarone for pat intolerance- pat did well and feel much better    CXR RT Pleural Effusion unchanged  Small Rt pleural effusion- chronic stable    Pat is  Stable and sob getting better  Lab prior to next vist    Discussed use, benefit, and side effects of prescribed medications. All patient questions answered. Pt voiced understanding. Instructed to continue current medications, diet and exercise. Continue risk factor modification and medical management. Patient agreed with treatment plan. Follow up as directed.       RTC in 6 months      Rogelio Gibbs [facility]

## 2022-11-15 RX ORDER — DOXEPIN HYDROCHLORIDE 25 MG/1
CAPSULE ORAL
Qty: 180 CAPSULE | Refills: 3 | Status: SHIPPED | OUTPATIENT
Start: 2022-11-15

## 2022-11-15 NOTE — TELEPHONE ENCOUNTER
Received refill request for Dowepin 25mg. Medication was last ordered by daquan. Medication was last ordered on 11/22/21 with 3 refills. Patient was last seen in the office 5/12/22. Patient has a scheduled follow up 5/15/23. Medication needs to be sent to [facility].

## 2022-12-13 RX ORDER — AMLODIPINE BESYLATE 10 MG/1
TABLET ORAL
Qty: 90 TABLET | Refills: 3 | Status: SHIPPED | OUTPATIENT
Start: 2022-12-13

## 2023-01-25 ENCOUNTER — PROCEDURE VISIT (OUTPATIENT)
Dept: CARDIOLOGY CLINIC | Age: 83
End: 2023-01-25

## 2023-01-25 DIAGNOSIS — Z95.0 PACEMAKER: Primary | ICD-10-CM

## 2023-01-25 NOTE — PROGRESS NOTES
Dr Bari Lee pt   Nxt [employer] dual pacer remote   Battery 7.5 yrs remaining    Vvir     A paced 100%  V paced 99%    Rv waves 13.9  Atrial impedence 653  Vent impedence 514    No thresholds obtained per the device   No episodes

## 2023-02-22 RX ORDER — POTASSIUM CHLORIDE 750 MG/1
TABLET, EXTENDED RELEASE ORAL
Qty: 180 TABLET | Refills: 1 | Status: SHIPPED | OUTPATIENT
Start: 2023-02-22 | End: 2023-02-24 | Stop reason: SDUPTHER

## 2023-02-24 RX ORDER — POTASSIUM CHLORIDE 750 MG/1
TABLET, EXTENDED RELEASE ORAL
Qty: 180 TABLET | Refills: 0 | Status: SHIPPED | OUTPATIENT
Start: 2023-02-24

## 2023-03-01 ENCOUNTER — TELEPHONE (OUTPATIENT)
Dept: CARDIOLOGY CLINIC | Age: 83
End: 2023-03-01

## 2023-03-01 NOTE — TELEPHONE ENCOUNTER
Patients wife Olivia Merino (on hipaa) calling in regarding his eliquis presription. He is just about out and the mail order pharmacy will not get it to him until the end of next week. Can he get just a 10 day supply sent to [facility] in [facility]? Please advise.

## 2023-04-25 DIAGNOSIS — K29.50 ANTRAL GASTRITIS: ICD-10-CM

## 2023-04-25 RX ORDER — OMEPRAZOLE 40 MG/1
CAPSULE, DELAYED RELEASE ORAL
Qty: 90 CAPSULE | Refills: 3 | Status: SHIPPED | OUTPATIENT
Start: 2023-04-25

## 2023-04-25 NOTE — TELEPHONE ENCOUNTER
The pharmacy is requesting a refill of the below medication which has been pended for you:     Requested Prescriptions     Pending Prescriptions Disp Refills    omeprazole (PRILOSEC) 40 MG delayed release capsule [Pharmacy Med Name: Omeprazole 40 MG Oral Capsule Delayed Release] 90 capsule 3     Sig: TAKE 1 CAPSULE DAILY       Last Appointment Date: 8/5/2022  Next Appointment Date: 5/19/2023    Allergies   Allergen Reactions    Amiodarone      DEPRESSION, CONSTIPATION    Lamisil [Terbinafine] Itching    Morphine Other (See Comments)     hallucinations    Nickel Hives    Adhesive Tape Rash

## 2023-04-27 ENCOUNTER — PROCEDURE VISIT (OUTPATIENT)
Dept: CARDIOLOGY CLINIC | Age: 83
End: 2023-04-27

## 2023-04-27 DIAGNOSIS — Z95.0 PACEMAKER: Primary | ICD-10-CM

## 2023-04-27 NOTE — PROGRESS NOTES
DR MCKEON PT   NXT [employer] DUAL PACER REMOTE   BATTERY 7.5 YRS REMAINING    VVIR   A PACED 100%  V PACED 99%    KNOWN AFIB/ ELIQUIS     RV WAVES 5.9  ATRIAL IMPEDENCE 653  VENT IMPEDENCE 508    NO THRESHOLDS OBTAINED PER THE DEVICE

## 2023-05-15 ENCOUNTER — OFFICE VISIT (OUTPATIENT)
Dept: PULMONOLOGY | Age: 83
End: 2023-05-15
Payer: MEDICARE

## 2023-05-15 VITALS
HEIGHT: 70 IN | OXYGEN SATURATION: 95 % | SYSTOLIC BLOOD PRESSURE: 132 MMHG | WEIGHT: 244.6 LBS | DIASTOLIC BLOOD PRESSURE: 80 MMHG | BODY MASS INDEX: 35.02 KG/M2 | HEART RATE: 61 BPM

## 2023-05-15 DIAGNOSIS — E66.9 OBESITY (BMI 30-39.9): ICD-10-CM

## 2023-05-15 DIAGNOSIS — R06.02 SOB (SHORTNESS OF BREATH): ICD-10-CM

## 2023-05-15 DIAGNOSIS — Z99.89 OBSTRUCTIVE SLEEP APNEA ON CPAP: Primary | ICD-10-CM

## 2023-05-15 DIAGNOSIS — G47.33 OBSTRUCTIVE SLEEP APNEA ON CPAP: Primary | ICD-10-CM

## 2023-05-15 DIAGNOSIS — G47.09 OTHER INSOMNIA: ICD-10-CM

## 2023-05-15 PROCEDURE — G8427 DOCREV CUR MEDS BY ELIG CLIN: HCPCS | Performed by: PHYSICIAN ASSISTANT

## 2023-05-15 PROCEDURE — 99214 OFFICE O/P EST MOD 30 MIN: CPT | Performed by: PHYSICIAN ASSISTANT

## 2023-05-15 PROCEDURE — 1123F ACP DISCUSS/DSCN MKR DOCD: CPT | Performed by: PHYSICIAN ASSISTANT

## 2023-05-15 PROCEDURE — G8417 CALC BMI ABV UP PARAM F/U: HCPCS | Performed by: PHYSICIAN ASSISTANT

## 2023-05-15 PROCEDURE — 3079F DIAST BP 80-89 MM HG: CPT | Performed by: PHYSICIAN ASSISTANT

## 2023-05-15 PROCEDURE — 1036F TOBACCO NON-USER: CPT | Performed by: PHYSICIAN ASSISTANT

## 2023-05-15 PROCEDURE — 3075F SYST BP GE 130 - 139MM HG: CPT | Performed by: PHYSICIAN ASSISTANT

## 2023-05-15 RX ORDER — LANOLIN ALCOHOL/MO/W.PET/CERES
10 CREAM (GRAM) TOPICAL DAILY
COMMUNITY

## 2023-05-15 ASSESSMENT — ENCOUNTER SYMPTOMS
STRIDOR: 0
DIARRHEA: 0
CHEST TIGHTNESS: 0
WHEEZING: 0
NAUSEA: 0
BACK PAIN: 0
EYES NEGATIVE: 1
SHORTNESS OF BREATH: 1
ALLERGIC/IMMUNOLOGIC NEGATIVE: 1
COUGH: 0

## 2023-05-16 SDOH — ECONOMIC STABILITY: FOOD INSECURITY: WITHIN THE PAST 12 MONTHS, THE FOOD YOU BOUGHT JUST DIDN'T LAST AND YOU DIDN'T HAVE MONEY TO GET MORE.: NEVER TRUE

## 2023-05-16 SDOH — ECONOMIC STABILITY: HOUSING INSECURITY
IN THE LAST 12 MONTHS, WAS THERE A TIME WHEN YOU DID NOT HAVE A STEADY PLACE TO SLEEP OR SLEPT IN A SHELTER (INCLUDING NOW)?: NO

## 2023-05-16 SDOH — ECONOMIC STABILITY: INCOME INSECURITY: HOW HARD IS IT FOR YOU TO PAY FOR THE VERY BASICS LIKE FOOD, HOUSING, MEDICAL CARE, AND HEATING?: NOT HARD AT ALL

## 2023-05-16 SDOH — ECONOMIC STABILITY: FOOD INSECURITY: WITHIN THE PAST 12 MONTHS, YOU WORRIED THAT YOUR FOOD WOULD RUN OUT BEFORE YOU GOT MONEY TO BUY MORE.: NEVER TRUE

## 2023-05-18 ENCOUNTER — HOSPITAL ENCOUNTER (OUTPATIENT)
Dept: PULMONOLOGY | Age: 83
Discharge: HOME | End: 2023-05-18
Payer: MEDICARE

## 2023-05-18 ENCOUNTER — HOSPITAL ENCOUNTER (OUTPATIENT)
Dept: GENERAL RADIOLOGY | Age: 83
Discharge: HOME | End: 2023-05-18
Payer: MEDICARE

## 2023-05-18 ENCOUNTER — HOSPITAL ENCOUNTER (OUTPATIENT)
Age: 83
Discharge: HOME | End: 2023-05-18
Payer: MEDICARE

## 2023-05-18 DIAGNOSIS — R06.02 SOB (SHORTNESS OF BREATH): ICD-10-CM

## 2023-05-18 PROCEDURE — 94729 DIFFUSING CAPACITY: CPT

## 2023-05-18 PROCEDURE — 94060 EVALUATION OF WHEEZING: CPT

## 2023-05-18 PROCEDURE — 94726 PLETHYSMOGRAPHY LUNG VOLUMES: CPT

## 2023-05-18 PROCEDURE — 71046 X-RAY EXAM CHEST 2 VIEWS: CPT

## 2023-05-19 ENCOUNTER — TELEPHONE (OUTPATIENT)
Dept: FAMILY MEDICINE CLINIC | Age: 83
End: 2023-05-19

## 2023-05-19 ENCOUNTER — OFFICE VISIT (OUTPATIENT)
Dept: FAMILY MEDICINE CLINIC | Age: 83
End: 2023-05-19

## 2023-05-19 VITALS
WEIGHT: 244 LBS | HEIGHT: 70 IN | DIASTOLIC BLOOD PRESSURE: 60 MMHG | RESPIRATION RATE: 20 BRPM | SYSTOLIC BLOOD PRESSURE: 116 MMHG | BODY MASS INDEX: 34.93 KG/M2 | HEART RATE: 64 BPM

## 2023-05-19 DIAGNOSIS — I10 ESSENTIAL HYPERTENSION: ICD-10-CM

## 2023-05-19 DIAGNOSIS — I50.32 CHRONIC DIASTOLIC HEART FAILURE (HCC): ICD-10-CM

## 2023-05-19 DIAGNOSIS — R91.8 MASS OF RIGHT LUNG: Primary | ICD-10-CM

## 2023-05-19 SDOH — ECONOMIC STABILITY: FOOD INSECURITY: WITHIN THE PAST 12 MONTHS, YOU WORRIED THAT YOUR FOOD WOULD RUN OUT BEFORE YOU GOT MONEY TO BUY MORE.: NEVER TRUE

## 2023-05-19 SDOH — ECONOMIC STABILITY: FOOD INSECURITY: WITHIN THE PAST 12 MONTHS, THE FOOD YOU BOUGHT JUST DIDN'T LAST AND YOU DIDN'T HAVE MONEY TO GET MORE.: NEVER TRUE

## 2023-05-19 SDOH — ECONOMIC STABILITY: INCOME INSECURITY: HOW HARD IS IT FOR YOU TO PAY FOR THE VERY BASICS LIKE FOOD, HOUSING, MEDICAL CARE, AND HEATING?: NOT HARD AT ALL

## 2023-05-19 ASSESSMENT — PATIENT HEALTH QUESTIONNAIRE - PHQ9
SUM OF ALL RESPONSES TO PHQ QUESTIONS 1-9: 1
1. LITTLE INTEREST OR PLEASURE IN DOING THINGS: 0
SUM OF ALL RESPONSES TO PHQ9 QUESTIONS 1 & 2: 1
SUM OF ALL RESPONSES TO PHQ QUESTIONS 1-9: 1
SUM OF ALL RESPONSES TO PHQ QUESTIONS 1-9: 1
2. FEELING DOWN, DEPRESSED OR HOPELESS: 1
SUM OF ALL RESPONSES TO PHQ QUESTIONS 1-9: 1

## 2023-05-19 ASSESSMENT — ENCOUNTER SYMPTOMS
SHORTNESS OF BREATH: 0
SINUS PRESSURE: 0
CONSTIPATION: 0

## 2023-05-19 NOTE — PROGRESS NOTES
Piper Gutierrez (:  1940) is a [de-identified] y.o. male,Established patient, here for evaluation of the following chief complaint(s):  Hypertension and Congestive Heart Failure         ASSESSMENT/PLAN:   Diagnosis Orders   1. Mass of right lung  CT CHEST WO CONTRAST      2. Essential hypertension        3. Chronic diastolic heart failure ([address]          See me in early August  We will call you about the timing of the CT         Subjective   SUBJECTIVE/OBJECTIVE:  HPI  We reviewed the health screening tests he had in [facility]  We discussed the CXR he had suggesting the need for the CT  Review of Systems   Constitutional:  Positive for fatigue. HENT:  Negative for sinus pressure. Eyes:  Negative for visual disturbance. Respiratory:  Negative for shortness of breath. Cardiovascular:  Negative for chest pain. Gastrointestinal:  Negative for constipation. Genitourinary: Negative. Musculoskeletal:  Positive for arthralgias, gait problem, joint swelling and myalgias. Skin:  Negative for rash. Neurological:  Positive for weakness. Negative for headaches. Objective   Physical Exam  Constitutional:       Appearance: Normal appearance. He is well-developed. HENT:      Head: Normocephalic and atraumatic. Eyes:      General: No scleral icterus. Conjunctiva/sclera: Conjunctivae normal.   Neck:      Trachea: No tracheal deviation. Cardiovascular:      Rate and Rhythm: Normal rate and regular rhythm. Heart sounds: Normal heart sounds. Pulmonary:      Effort: Pulmonary effort is normal.      Breath sounds: Normal breath sounds. Skin:     General: Skin is warm and dry. Neurological:      General: No focal deficit present. Mental Status: He is alert. Psychiatric:         Behavior: Behavior normal.              An electronic signature was used to authenticate this note.     --Collins Andres MD

## 2023-05-19 NOTE — TELEPHONE ENCOUNTER
Ct chest scheduled for 5/20/2023 at 1230pn at [address]. Patient to arrive by 12pm.  No pa needed.    Patient informed via phone

## 2023-05-19 NOTE — PROGRESS NOTES
No components found for: CHLPL  Lab Results   Component Value Date    TRIG 62 07/06/2022     Lab Results   Component Value Date    HDL 44 07/06/2022     Lab Results   Component Value Date    LDLCALC 67 07/06/2022     Lab Results   Component Value Date    LABVLDL 12 07/06/2022       Lab Results   Component Value Date    ALT 15 07/06/2022    AST 23 07/06/2022    ALKPHOS 88 07/06/2022    BILITOT 0.7 07/06/2022           Is patient currently taking any cholesterol medications? Yes   If yes, see med list as above    Is the patient reporting any side effects of cholesterol medications? No    Is the patient taking any over the counter medications? Yes   If yes, see med list as above    Is the patient taking a daily aspirin? Yes      Patient Self-Management Goal for Chronic Condition  Goal: I will take all medications as prescribed by my doctor, and I will call the office if I am having any medication problems. Barriers to success: none  Plan for overcoming my barriers: N/A     Confidence: 9/10  Date goal set: 5/19/23  Date goal attained:     Have you seen any other physician or provider since your last visit no    Have you had any other diagnostic tests since your last visit? no    Have you changed or stopped any medications since your last visit including any over-the-counter medicines, vitamins, or herbal medicines? no     Are you taking all your prescribed medications?  Yes    If NO, why?

## 2023-05-20 ENCOUNTER — HOSPITAL ENCOUNTER (OUTPATIENT)
Dept: CT IMAGING | Age: 83
Discharge: HOME | End: 2023-05-20
Payer: MEDICARE

## 2023-05-20 DIAGNOSIS — R91.8 MASS OF RIGHT LUNG: ICD-10-CM

## 2023-05-20 PROCEDURE — 71250 CT THORAX DX C-: CPT

## 2023-05-23 ENCOUNTER — OFFICE VISIT (OUTPATIENT)
Dept: CARDIOLOGY CLINIC | Age: 83
End: 2023-05-23
Payer: MEDICARE

## 2023-05-23 VITALS
HEART RATE: 60 BPM | BODY MASS INDEX: 35.53 KG/M2 | HEIGHT: 70 IN | WEIGHT: 248.2 LBS | SYSTOLIC BLOOD PRESSURE: 114 MMHG | DIASTOLIC BLOOD PRESSURE: 71 MMHG

## 2023-05-23 DIAGNOSIS — I50.32 CHRONIC DIASTOLIC HEART FAILURE (HCC): Primary | ICD-10-CM

## 2023-05-23 DIAGNOSIS — I47.29 NSVT (NONSUSTAINED VENTRICULAR TACHYCARDIA) (HCC): ICD-10-CM

## 2023-05-23 DIAGNOSIS — R93.89 ABNORMAL CT OF THE CHEST: Primary | ICD-10-CM

## 2023-05-23 DIAGNOSIS — E78.00 PURE HYPERCHOLESTEROLEMIA: ICD-10-CM

## 2023-05-23 DIAGNOSIS — I48.11 LONGSTANDING PERSISTENT ATRIAL FIBRILLATION (HCC): ICD-10-CM

## 2023-05-23 DIAGNOSIS — I10 ESSENTIAL HYPERTENSION: ICD-10-CM

## 2023-05-23 PROCEDURE — 1123F ACP DISCUSS/DSCN MKR DOCD: CPT | Performed by: INTERNAL MEDICINE

## 2023-05-23 PROCEDURE — 3078F DIAST BP <80 MM HG: CPT | Performed by: INTERNAL MEDICINE

## 2023-05-23 PROCEDURE — 99214 OFFICE O/P EST MOD 30 MIN: CPT | Performed by: INTERNAL MEDICINE

## 2023-05-23 PROCEDURE — 3074F SYST BP LT 130 MM HG: CPT | Performed by: INTERNAL MEDICINE

## 2023-05-23 PROCEDURE — G8417 CALC BMI ABV UP PARAM F/U: HCPCS | Performed by: INTERNAL MEDICINE

## 2023-05-23 PROCEDURE — 1036F TOBACCO NON-USER: CPT | Performed by: INTERNAL MEDICINE

## 2023-05-23 PROCEDURE — G8427 DOCREV CUR MEDS BY ELIG CLIN: HCPCS | Performed by: INTERNAL MEDICINE

## 2023-05-23 NOTE — PROGRESS NOTES
electronically signed by Dr. Diaz Going on 4/8/2019 11:52 AM         Persistence of a small to moderate right-sided pleural effusion with right basilar opacities which may represent atelectasis or infiltrate. Oral stable appearance of the chest when compared to the previous exam.           This report has been created using voice recognition software. It may contain minor errors which are inherent in voice recognition technology. Final report electronically signed by Dr Jack Dominguez on 6/4/2019 1:05 PM     There is an infrarenal abdominal aortic aneurysm measuring 3.6 x 3.3 cm. Previously this measured 3.2 x 3.1 cm on Friday, 5/10/2018. 2. Mild bilateral common iliac artery ectasia is noted. These measure 1.6 cm in diameter bilaterally. This report has been created using voice recognition software. It may contain minor errors which are inherent in voice recognition technology. Final report electronically signed by Dr. Diaz Going on 5/19/2021 4:50 PM             EKG 9/30/19  Electronic ventricular pacemaker   Pacemaker ECG, No further analysis   INSUFFICIENT DATA    Ekg dec 6, 2022  V paced rhythm and underlying atr fib      Echo  Conclusions      Summary   Left ventricle size is normal.   Mildly increased left ventricle wall thickness. Systolic function was normal.   There were no regional wall motion abnormalities. Ejection fraction is visually estimated at 55%. The left atrium is Severely dilated. Mild-to-moderate mitral stenosis. The peak mitral valve velocity was 2.2 m/s, peak gradient was 18 mmHg, and   the mean gradient was 6 mmHg. Right ventricular systolic pressure measures 55 mmhg.       Signature      ----------------------------------------------------------------   Electronically signed by Romario Loyd MD (Interpreting   physician) on 06/15/2020 at 06:56 PM    Assessment  B/L PPP edema trace to +1 CONT LASIX    Pacemaker dependant under battery wtach

## 2023-05-25 ENCOUNTER — OFFICE VISIT (OUTPATIENT)
Dept: PULMONOLOGY | Age: 83
End: 2023-05-25
Payer: MEDICARE

## 2023-05-25 VITALS
DIASTOLIC BLOOD PRESSURE: 82 MMHG | SYSTOLIC BLOOD PRESSURE: 126 MMHG | OXYGEN SATURATION: 94 % | BODY MASS INDEX: 35.05 KG/M2 | HEART RATE: 60 BPM | HEIGHT: 70 IN | TEMPERATURE: 97.7 F | WEIGHT: 244.8 LBS

## 2023-05-25 DIAGNOSIS — R59.0 MEDIASTINAL LYMPHADENOPATHY: ICD-10-CM

## 2023-05-25 DIAGNOSIS — R91.8 RIGHT LOWER LOBE LUNG MASS: Primary | ICD-10-CM

## 2023-05-25 DIAGNOSIS — J90 CHRONIC PLEURAL EFFUSION: ICD-10-CM

## 2023-05-25 DIAGNOSIS — J98.4 RESTRICTIVE LUNG DISEASE: ICD-10-CM

## 2023-05-25 PROCEDURE — 1036F TOBACCO NON-USER: CPT | Performed by: INTERNAL MEDICINE

## 2023-05-25 PROCEDURE — G8427 DOCREV CUR MEDS BY ELIG CLIN: HCPCS | Performed by: INTERNAL MEDICINE

## 2023-05-25 PROCEDURE — G8417 CALC BMI ABV UP PARAM F/U: HCPCS | Performed by: INTERNAL MEDICINE

## 2023-05-25 PROCEDURE — 3074F SYST BP LT 130 MM HG: CPT | Performed by: INTERNAL MEDICINE

## 2023-05-25 PROCEDURE — 3079F DIAST BP 80-89 MM HG: CPT | Performed by: INTERNAL MEDICINE

## 2023-05-25 PROCEDURE — 99205 OFFICE O/P NEW HI 60 MIN: CPT | Performed by: INTERNAL MEDICINE

## 2023-05-25 PROCEDURE — 1123F ACP DISCUSS/DSCN MKR DOCD: CPT | Performed by: INTERNAL MEDICINE

## 2023-05-25 ASSESSMENT — ENCOUNTER SYMPTOMS
EYE PAIN: 0
BACK PAIN: 1
EYE ITCHING: 0
SINUS PAIN: 0
WHEEZING: 0
NAUSEA: 0
SORE THROAT: 0
BLOOD IN STOOL: 0
SHORTNESS OF BREATH: 0
ABDOMINAL PAIN: 1
CONSTIPATION: 1
SINUS PRESSURE: 0
RHINORRHEA: 1
COUGH: 0
DIARRHEA: 0

## 2023-05-25 NOTE — PROGRESS NOTES
Neck Circumference -   17  Mallampati - 3    Lung Nodule Screening     [] Qualifies    [] Does not qualify   [] Declined    [] Completed
subclavian pacemaker with right atrial and right ventricular leads. TRACHEA/HEART/MEDIASTINUM/HILUM:   1. Mild enlargement of the cardiac silhouette. LUNG SLADE:   1. There is a persistent however smaller right pleural effusion which is small and the current examination. There is also patchy atelectasis at the right lung base. There is no pulmonary vascular congestion. OTHER: None. PNEUMOTHORAX: None. OSSEOUS STRUCTURES:   1. No acute osseous abnormality. IMPRESSION:  1. There is a persistent however smaller right pleural effusion which is small and the current examination. There is also patchy atelectasis at the right lung base. There is no pulmonary vascular congestion. Follow-up chest radiographs are recommended to   confirm complete resolution. **This report has been created using voice recognition software. It may contain minor errors which are inherent in voice recognition technology. **     Final report electronically signed by Dr. Troy Bello on 6/8/2020 10:18 AM                 Narrative & Impression  PROCEDURE: CT CHEST WO CONTRAST     CLINICAL INFORMATION: Mass of right lung     COMPARISON: Chest radiograph 5/18/2023     TECHNIQUE:   Helical CT acquisition of the chest was performed without intravenous contrast. Multiplanar reformats are provided. All CT scans at this facility use dose modulation, iterative reconstruction, and/or weight based dosing when appropriate to reduce the radiation dose to as low as reasonably achievable. FINDINGS:     The noncontrast CT limits the evaluation for solid organ pathology, vascular pathology, and lymphadenopathy. A small to moderate right pleural effusion with pleural thickening. Rounded atelectasis is seen in the right lung base. There is right lower lobe volume loss. Central airway is patent. No significant pericardial effusion. The heart is moderately enlarged. Ascending thoracic aorta is not dilated.

## 2023-06-09 ENCOUNTER — OFFICE VISIT (OUTPATIENT)
Dept: FAMILY MEDICINE CLINIC | Age: 83
End: 2023-06-09

## 2023-06-09 VITALS
HEIGHT: 70 IN | RESPIRATION RATE: 14 BRPM | BODY MASS INDEX: 35.07 KG/M2 | SYSTOLIC BLOOD PRESSURE: 134 MMHG | DIASTOLIC BLOOD PRESSURE: 76 MMHG | WEIGHT: 245 LBS | HEART RATE: 70 BPM

## 2023-06-09 DIAGNOSIS — S39.012A STRAIN OF LUMBAR REGION, INITIAL ENCOUNTER: Primary | ICD-10-CM

## 2023-06-09 ASSESSMENT — ENCOUNTER SYMPTOMS
CONSTIPATION: 0
SHORTNESS OF BREATH: 0
BACK PAIN: 1
SINUS PRESSURE: 0

## 2023-06-09 NOTE — PROGRESS NOTES
Behavior: Behavior normal.                An electronic signature was used to authenticate this note.     --Diana Luz MD

## 2023-06-27 LAB
ALBUMIN SERPL-MCNC: 4.6 G/DL (ref 3.5–5.2)
ALK PHOSPHATASE: 97 U/L (ref 40–125)
ALT SERPL-CCNC: 17 U/L (ref 5–50)
ANION GAP SERPL CALCULATED.3IONS-SCNC: 10 MEQ/L (ref 7–16)
AST SERPL-CCNC: 26 U/L (ref 9–50)
BILIRUB SERPL-MCNC: 0.9 MG/DL
BILIRUBIN DIRECT: 0.3 MG/DL (ref 0–0.3)
BUN BLDV-MCNC: 25 MG/DL (ref 8–23)
CALCIUM SERPL-MCNC: 9.7 MG/DL (ref 8.5–10.5)
CHLORIDE BLD-SCNC: 105 MEQ/L (ref 95–107)
CHOLESTEROL/HDL RATIO: 2.9 RATIO
CHOLESTEROL: 132 MG/DL
CO2: 27 MEQ/L (ref 19–31)
CREAT SERPL-MCNC: 1.14 MG/DL (ref 0.8–1.4)
EGFR IF NONAFRICAN AMERICAN: 65 ML/MIN/1.73
GLUCOSE: 94 MG/DL (ref 70–99)
HDLC SERPL-MCNC: 45 MG/DL
LDL CHOLESTEROL CALCULATED: 70 MG/DL
LDL/HDL RATIO: 1.6 RATIO
MAGNESIUM: 2.2 MG/DL (ref 1.6–2.6)
POTASSIUM SERPL-SCNC: 4.6 MEQ/L (ref 3.5–5.4)
SODIUM BLD-SCNC: 142 MEQ/L (ref 133–146)
TOTAL PROTEIN: 6.9 G/DL (ref 6.1–8.3)
TRIGL SERPL-MCNC: 83 MG/DL
VLDLC SERPL CALC-MCNC: 17 MG/DL

## 2023-06-28 ENCOUNTER — OFFICE VISIT (OUTPATIENT)
Dept: PULMONOLOGY | Age: 83
End: 2023-06-28
Payer: MEDICARE

## 2023-06-28 VITALS
HEART RATE: 61 BPM | WEIGHT: 246 LBS | BODY MASS INDEX: 35.22 KG/M2 | TEMPERATURE: 98.2 F | SYSTOLIC BLOOD PRESSURE: 128 MMHG | DIASTOLIC BLOOD PRESSURE: 62 MMHG | OXYGEN SATURATION: 94 % | HEIGHT: 70 IN

## 2023-06-28 DIAGNOSIS — J94.1 FIBROTHORAX: ICD-10-CM

## 2023-06-28 DIAGNOSIS — R59.0 LAD (LYMPHADENOPATHY), MEDIASTINAL: ICD-10-CM

## 2023-06-28 DIAGNOSIS — J90 PLEURAL EFFUSION: ICD-10-CM

## 2023-06-28 DIAGNOSIS — G47.33 OSA (OBSTRUCTIVE SLEEP APNEA): ICD-10-CM

## 2023-06-28 DIAGNOSIS — J98.11 ATELECTASIS: ICD-10-CM

## 2023-06-28 DIAGNOSIS — J98.4 RESTRICTIVE LUNG DISEASE: Primary | ICD-10-CM

## 2023-06-28 PROCEDURE — 3078F DIAST BP <80 MM HG: CPT | Performed by: INTERNAL MEDICINE

## 2023-06-28 PROCEDURE — 1036F TOBACCO NON-USER: CPT | Performed by: INTERNAL MEDICINE

## 2023-06-28 PROCEDURE — 1123F ACP DISCUSS/DSCN MKR DOCD: CPT | Performed by: INTERNAL MEDICINE

## 2023-06-28 PROCEDURE — G8417 CALC BMI ABV UP PARAM F/U: HCPCS | Performed by: INTERNAL MEDICINE

## 2023-06-28 PROCEDURE — 99214 OFFICE O/P EST MOD 30 MIN: CPT | Performed by: INTERNAL MEDICINE

## 2023-06-28 PROCEDURE — 3074F SYST BP LT 130 MM HG: CPT | Performed by: INTERNAL MEDICINE

## 2023-06-28 PROCEDURE — G8427 DOCREV CUR MEDS BY ELIG CLIN: HCPCS | Performed by: INTERNAL MEDICINE

## 2023-06-28 ASSESSMENT — ENCOUNTER SYMPTOMS
RHINORRHEA: 0
BACK PAIN: 1
ABDOMINAL PAIN: 0
NAUSEA: 0
EYE PAIN: 0
SINUS PRESSURE: 0
COUGH: 0
SHORTNESS OF BREATH: 0
SINUS PAIN: 0
BLOOD IN STOOL: 0
SORE THROAT: 0
DIARRHEA: 0
EYE ITCHING: 0
CONSTIPATION: 1
WHEEZING: 0

## 2023-07-28 RX ORDER — POTASSIUM CHLORIDE 750 MG/1
TABLET, EXTENDED RELEASE ORAL
Qty: 90 TABLET | Refills: 3 | Status: SHIPPED | OUTPATIENT
Start: 2023-07-28

## 2023-07-28 RX ORDER — METOPROLOL TARTRATE 50 MG/1
TABLET, FILM COATED ORAL
Qty: 270 TABLET | Refills: 3 | Status: SHIPPED | OUTPATIENT
Start: 2023-07-28

## 2023-07-28 RX ORDER — FUROSEMIDE 40 MG/1
TABLET ORAL
Qty: 90 TABLET | Refills: 3 | Status: SHIPPED | OUTPATIENT
Start: 2023-07-28

## 2023-07-28 RX ORDER — SIMVASTATIN 10 MG
TABLET ORAL
Qty: 90 TABLET | Refills: 3 | Status: SHIPPED | OUTPATIENT
Start: 2023-07-28

## 2023-08-02 ENCOUNTER — PROCEDURE VISIT (OUTPATIENT)
Dept: CARDIOLOGY CLINIC | Age: 83
End: 2023-08-02

## 2023-08-02 DIAGNOSIS — Z95.0 HISTORY OF CARDIAC PACEMAKER IN SITU: Primary | ICD-10-CM

## 2023-08-02 NOTE — PROGRESS NOTES
Jaime jhon sci dual pacer     . David Presume Battery longevity:  7.5 years on device   Presenting rhythm      Atrial impedance 648  RV impedance 503    P wave sensing not measured   R wave sensing not measured     100 % atrial paced  99 % RV paced     Mode switches   not measured

## 2023-08-15 ENCOUNTER — TELEPHONE (OUTPATIENT)
Dept: CARDIOLOGY CLINIC | Age: 83
End: 2023-08-15

## 2023-08-15 NOTE — TELEPHONE ENCOUNTER
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 Joel Heck  Date of surgery/procedure: 08/23/23    Last Office Visit: 05/23/23  Last Stress:   Last Echo: 09/12/22  Last Cath:   Last Stent:   Is patient on blood thinners?  Eliquis & ASA  Hold Meds/how many days Eliquis for 2 days & continue ASA

## 2023-08-29 ENCOUNTER — OFFICE VISIT (OUTPATIENT)
Dept: FAMILY MEDICINE CLINIC | Age: 83
End: 2023-08-29

## 2023-08-29 VITALS
SYSTOLIC BLOOD PRESSURE: 136 MMHG | RESPIRATION RATE: 20 BRPM | WEIGHT: 230 LBS | DIASTOLIC BLOOD PRESSURE: 60 MMHG | BODY MASS INDEX: 32.93 KG/M2 | HEIGHT: 70 IN | HEART RATE: 68 BPM

## 2023-08-29 DIAGNOSIS — Z00.00 MEDICARE ANNUAL WELLNESS VISIT, SUBSEQUENT: Primary | ICD-10-CM

## 2023-08-29 DIAGNOSIS — I10 ESSENTIAL HYPERTENSION: ICD-10-CM

## 2023-08-29 PROCEDURE — 1123F ACP DISCUSS/DSCN MKR DOCD: CPT | Performed by: FAMILY MEDICINE

## 2023-08-29 PROCEDURE — 1036F TOBACCO NON-USER: CPT | Performed by: FAMILY MEDICINE

## 2023-08-29 PROCEDURE — G8427 DOCREV CUR MEDS BY ELIG CLIN: HCPCS | Performed by: FAMILY MEDICINE

## 2023-08-29 PROCEDURE — G0439 PPPS, SUBSEQ VISIT: HCPCS | Performed by: FAMILY MEDICINE

## 2023-08-29 PROCEDURE — G8417 CALC BMI ABV UP PARAM F/U: HCPCS | Performed by: FAMILY MEDICINE

## 2023-08-29 ASSESSMENT — LIFESTYLE VARIABLES
HOW OFTEN DURING THE LAST YEAR HAVE YOU BEEN UNABLE TO REMEMBER WHAT HAPPENED THE NIGHT BEFORE BECAUSE YOU HAD BEEN DRINKING: 0
HOW OFTEN DURING THE LAST YEAR HAVE YOU FAILED TO DO WHAT WAS NORMALLY EXPECTED FROM YOU BECAUSE OF DRINKING: 0
HOW OFTEN DURING THE LAST YEAR HAVE YOU FOUND THAT YOU WERE NOT ABLE TO STOP DRINKING ONCE YOU HAD STARTED: 0
HAVE YOU OR SOMEONE ELSE BEEN INJURED AS A RESULT OF YOUR DRINKING: 0
HOW OFTEN DURING THE LAST YEAR HAVE YOU NEEDED AN ALCOHOLIC DRINK FIRST THING IN THE MORNING TO GET YOURSELF GOING AFTER A NIGHT OF HEAVY DRINKING: 0
HAS A RELATIVE, FRIEND, DOCTOR, OR ANOTHER HEALTH PROFESSIONAL EXPRESSED CONCERN ABOUT YOUR DRINKING OR SUGGESTED YOU CUT DOWN: 0
HOW OFTEN DO YOU HAVE A DRINK CONTAINING ALCOHOL: 4 OR MORE TIMES A WEEK
HOW MANY STANDARD DRINKS CONTAINING ALCOHOL DO YOU HAVE ON A TYPICAL DAY: 1 OR 2
HOW OFTEN DURING THE LAST YEAR HAVE YOU HAD A FEELING OF GUILT OR REMORSE AFTER DRINKING: 0

## 2023-08-29 ASSESSMENT — PATIENT HEALTH QUESTIONNAIRE - PHQ9
SUM OF ALL RESPONSES TO PHQ QUESTIONS 1-9: 2
SUM OF ALL RESPONSES TO PHQ9 QUESTIONS 1 & 2: 2
2. FEELING DOWN, DEPRESSED OR HOPELESS: 1
SUM OF ALL RESPONSES TO PHQ QUESTIONS 1-9: 2
1. LITTLE INTEREST OR PLEASURE IN DOING THINGS: 1
SUM OF ALL RESPONSES TO PHQ QUESTIONS 1-9: 2
SUM OF ALL RESPONSES TO PHQ QUESTIONS 1-9: 2

## 2023-10-13 RX ORDER — AMLODIPINE BESYLATE 10 MG/1
TABLET ORAL
Qty: 90 TABLET | Refills: 2 | Status: SHIPPED | OUTPATIENT
Start: 2023-10-13

## 2023-10-16 RX ORDER — DOXEPIN HYDROCHLORIDE 25 MG/1
CAPSULE ORAL
Qty: 180 CAPSULE | Refills: 3 | Status: SHIPPED | OUTPATIENT
Start: 2023-10-16 | End: 2023-10-17

## 2023-10-17 RX ORDER — DOXEPIN HYDROCHLORIDE 25 MG/1
CAPSULE ORAL
Qty: 180 CAPSULE | Refills: 3 | Status: SHIPPED | OUTPATIENT
Start: 2023-10-17

## 2023-10-30 ENCOUNTER — NURSE ONLY (OUTPATIENT)
Dept: CARDIOLOGY CLINIC | Age: 83
End: 2023-10-30

## 2023-10-30 DIAGNOSIS — Z95.0 HISTORY OF CARDIAC PACEMAKER IN SITU: Primary | ICD-10-CM

## 2023-10-30 NOTE — PROGRESS NOTES
Min sci dual pacer in office   Programmed VVIR     . [address] Battery longevity:  7 years on device   Presenting rhythm      Atrial impedance 644  RV impedance 494    P wave sensing none, he's dependent  R wave sensing none, he's dependent     0 % atrial paced  99 % RV paced     RV threshold 1.7 V at 0.4ms

## 2023-11-28 ENCOUNTER — TELEPHONE (OUTPATIENT)
Dept: FAMILY MEDICINE CLINIC | Age: 83
End: 2023-11-28

## 2023-11-28 NOTE — TELEPHONE ENCOUNTER
Patient called stating that his wife is concerned about him taking the Miralax every day. He is just using it in the am and it seems to help with his constipation. He just wants to make sure it's ok for him to take it every day.     Please call him back

## 2024-01-31 ENCOUNTER — PROCEDURE VISIT (OUTPATIENT)
Dept: CARDIOLOGY CLINIC | Age: 84
End: 2024-01-31

## 2024-01-31 DIAGNOSIS — Z95.0 PACEMAKER: Primary | ICD-10-CM

## 2024-01-31 NOTE — PROGRESS NOTES
Remote Min Sci Dual Pacemaker   Patient of Roula    Battery 6.5 years    Presenting rhythm     A Impedance 657  RV Impedance 490    P wave sensing -  R wave sensing 7.4    A Threshold - @ -  RV Thresholds - @ -      A Paced 0%  V Paced 99%    Programmed Mode VVIR       Afib [place] -%    Episodes none

## 2024-03-29 DIAGNOSIS — K29.50 ANTRAL GASTRITIS: ICD-10-CM

## 2024-04-01 RX ORDER — OMEPRAZOLE 40 MG/1
40 CAPSULE, DELAYED RELEASE ORAL DAILY
Qty: 90 CAPSULE | Refills: 3 | Status: SHIPPED | OUTPATIENT
Start: 2024-04-01

## 2024-04-01 NOTE — TELEPHONE ENCOUNTER
Date of last visit:  8/29/2023  Date of next visit:  5/15/2024    Requested Prescriptions     Pending Prescriptions Disp Refills    omeprazole (PRILOSEC) 40 MG delayed release capsule [Pharmacy Med Name: Omeprazole 40 MG Oral Capsule Delayed Release] 90 capsule 3     Sig: TAKE 1 CAPSULE BY MOUTH DAILY

## 2024-04-23 NOTE — PLAN OF CARE
Discharge instructions given to pt and wife, both \"voiced understanding\"    Up in room and doing well Identified pt with two pt identifiers(name and ).    Chief Complaint   Patient presents with    Hemorrhoids     Patient has had reoccurring hemorrhoids and would like to discuss         Health Maintenance Due   Topic    Hepatitis B vaccine (1 of 3 - 3-dose series)    COVID-19 Vaccine (1)    Varicella vaccine (1 of 2 - 2-dose childhood series)    HPV vaccine (1 - Male 2-dose series)    DTaP/Tdap/Td vaccine (1 - Tdap)       Wt Readings from Last 3 Encounters:   24 77.6 kg (171 lb)   24 77.8 kg (171 lb 9.6 oz)   24 74.8 kg (165 lb)     Temp Readings from Last 3 Encounters:   24 98.8 °F (37.1 °C) (Temporal)     BP Readings from Last 3 Encounters:   24 108/60     Pulse Readings from Last 3 Encounters:   24 95           Depression Screening:  :         3/19/2024     2:56 PM   PHQ-9 Questionaire   Little interest or pleasure in doing things 0   Feeling down, depressed, or hopeless 0   PHQ-9 Total Score 0        Fall Risk Assessment:  :          No data to display                 Abuse Screening:  :          No data to display                 Coordination of Care Questionnaire:  :     \"Have you been to the ER, urgent care clinic since your last visit?  Hospitalized since your last visit?\"    NO    “Have you seen or consulted any other health care providers outside of [facility] since your last visit?”    NO

## 2024-05-02 ENCOUNTER — PROCEDURE VISIT (OUTPATIENT)
Dept: CARDIOLOGY CLINIC | Age: 84
End: 2024-05-02

## 2024-05-02 DIAGNOSIS — Z95.0 PACEMAKER: Primary | ICD-10-CM

## 2024-05-03 ENCOUNTER — OFFICE VISIT (OUTPATIENT)
Dept: PULMONOLOGY | Age: 84
End: 2024-05-03
Payer: MEDICARE

## 2024-05-03 VITALS
BODY MASS INDEX: 35.22 KG/M2 | HEIGHT: 70 IN | WEIGHT: 246 LBS | HEART RATE: 59 BPM | SYSTOLIC BLOOD PRESSURE: 110 MMHG | DIASTOLIC BLOOD PRESSURE: 60 MMHG | TEMPERATURE: 97.9 F | OXYGEN SATURATION: 98 %

## 2024-05-03 DIAGNOSIS — G47.33 OBSTRUCTIVE SLEEP APNEA ON CPAP: Primary | ICD-10-CM

## 2024-05-03 DIAGNOSIS — E66.9 OBESITY (BMI 30-39.9): ICD-10-CM

## 2024-05-03 PROCEDURE — G8427 DOCREV CUR MEDS BY ELIG CLIN: HCPCS | Performed by: NURSE PRACTITIONER

## 2024-05-03 PROCEDURE — 3074F SYST BP LT 130 MM HG: CPT | Performed by: NURSE PRACTITIONER

## 2024-05-03 PROCEDURE — 1123F ACP DISCUSS/DSCN MKR DOCD: CPT | Performed by: NURSE PRACTITIONER

## 2024-05-03 PROCEDURE — 3078F DIAST BP <80 MM HG: CPT | Performed by: NURSE PRACTITIONER

## 2024-05-03 PROCEDURE — 1036F TOBACCO NON-USER: CPT | Performed by: NURSE PRACTITIONER

## 2024-05-03 PROCEDURE — 99214 OFFICE O/P EST MOD 30 MIN: CPT | Performed by: NURSE PRACTITIONER

## 2024-05-03 PROCEDURE — G8417 CALC BMI ABV UP PARAM F/U: HCPCS | Performed by: NURSE PRACTITIONER

## 2024-05-03 ASSESSMENT — ENCOUNTER SYMPTOMS
DIARRHEA: 0
WHEEZING: 0
EYES NEGATIVE: 1
RESPIRATORY NEGATIVE: 1
ALLERGIC/IMMUNOLOGIC NEGATIVE: 1
STRIDOR: 0
CHEST TIGHTNESS: 0
GASTROINTESTINAL NEGATIVE: 1
VOMITING: 0
NAUSEA: 0

## 2024-05-03 NOTE — PROGRESS NOTES
described pressure.   - He  advised to keep good compliance with current recommended pressure to get optimal results and clinical improvement  - Recommend 7-9 hours of sleep with PAP  - He was advised to call [email or web address] company regarding supplies if needed.   -He call my office for earlier appointment if needed for worsening of sleep symptoms.   - He was instructed on weight loss  - Johnie was educated about my impression and plan. Patient verbalizesunderstanding.  We will see Johnie Butler back in: 3 months with download    Information added by my medical assistant/LPN was reviewed today   Electronically signed by JULIET Tucker CNP on 5/3/2024 at 12:04 PM

## 2024-05-13 SDOH — HEALTH STABILITY: PHYSICAL HEALTH: ON AVERAGE, HOW MANY DAYS PER WEEK DO YOU ENGAGE IN MODERATE TO STRENUOUS EXERCISE (LIKE A BRISK WALK)?: 5 DAYS

## 2024-05-13 SDOH — HEALTH STABILITY: PHYSICAL HEALTH: ON AVERAGE, HOW MANY MINUTES DO YOU ENGAGE IN EXERCISE AT THIS LEVEL?: 10 MIN

## 2024-05-13 ASSESSMENT — LIFESTYLE VARIABLES
HAS A RELATIVE, FRIEND, DOCTOR, OR ANOTHER HEALTH PROFESSIONAL EXPRESSED CONCERN ABOUT YOUR DRINKING OR SUGGESTED YOU CUT DOWN: NO
HOW OFTEN DURING THE LAST YEAR HAVE YOU HAD A FEELING OF GUILT OR REMORSE AFTER DRINKING: NEVER
HOW OFTEN DURING THE LAST YEAR HAVE YOU NEEDED AN ALCOHOLIC DRINK FIRST THING IN THE MORNING TO GET YOURSELF GOING AFTER A NIGHT OF HEAVY DRINKING: NEVER
HOW OFTEN DO YOU HAVE A DRINK CONTAINING ALCOHOL: 5
HOW MANY STANDARD DRINKS CONTAINING ALCOHOL DO YOU HAVE ON A TYPICAL DAY: 1 OR 2
HOW MANY STANDARD DRINKS CONTAINING ALCOHOL DO YOU HAVE ON A TYPICAL DAY: 1
HOW OFTEN DURING THE LAST YEAR HAVE YOU NEEDED AN ALCOHOLIC DRINK FIRST THING IN THE MORNING TO GET YOURSELF GOING AFTER A NIGHT OF HEAVY DRINKING: NEVER
HOW OFTEN DO YOU HAVE SIX OR MORE DRINKS ON ONE OCCASION: 1
HOW OFTEN DURING THE LAST YEAR HAVE YOU FOUND THAT YOU WERE NOT ABLE TO STOP DRINKING ONCE YOU HAD STARTED: NEVER
HOW OFTEN DURING THE LAST YEAR HAVE YOU BEEN UNABLE TO REMEMBER WHAT HAPPENED THE NIGHT BEFORE BECAUSE YOU HAD BEEN DRINKING: NEVER
HAVE YOU OR SOMEONE ELSE BEEN INJURED AS A RESULT OF YOUR DRINKING: NO
HAVE YOU OR SOMEONE ELSE BEEN INJURED AS A RESULT OF YOUR DRINKING: NO
HOW OFTEN DURING THE LAST YEAR HAVE YOU FAILED TO DO WHAT WAS NORMALLY EXPECTED FROM YOU BECAUSE OF DRINKING: NEVER
HAS A RELATIVE, FRIEND, DOCTOR, OR ANOTHER HEALTH PROFESSIONAL EXPRESSED CONCERN ABOUT YOUR DRINKING OR SUGGESTED YOU CUT DOWN: NO
HOW OFTEN DURING THE LAST YEAR HAVE YOU BEEN UNABLE TO REMEMBER WHAT HAPPENED THE NIGHT BEFORE BECAUSE YOU HAD BEEN DRINKING: NEVER
HOW OFTEN DURING THE LAST YEAR HAVE YOU HAD A FEELING OF GUILT OR REMORSE AFTER DRINKING: NEVER
HOW OFTEN DO YOU HAVE A DRINK CONTAINING ALCOHOL: 4 OR MORE TIMES A WEEK
HOW OFTEN DURING THE LAST YEAR HAVE YOU FOUND THAT YOU WERE NOT ABLE TO STOP DRINKING ONCE YOU HAD STARTED: NEVER
HOW OFTEN DURING THE LAST YEAR HAVE YOU FAILED TO DO WHAT WAS NORMALLY EXPECTED FROM YOU BECAUSE OF DRINKING: NEVER

## 2024-05-13 ASSESSMENT — PATIENT HEALTH QUESTIONNAIRE - PHQ9
SUM OF ALL RESPONSES TO PHQ QUESTIONS 1-9: 1
SUM OF ALL RESPONSES TO PHQ9 QUESTIONS 1 & 2: 1
SUM OF ALL RESPONSES TO PHQ QUESTIONS 1-9: 1
SUM OF ALL RESPONSES TO PHQ QUESTIONS 1-9: 1
1. LITTLE INTEREST OR PLEASURE IN DOING THINGS: NOT AT ALL
2. FEELING DOWN, DEPRESSED OR HOPELESS: SEVERAL DAYS
SUM OF ALL RESPONSES TO PHQ QUESTIONS 1-9: 1

## 2024-05-14 ENCOUNTER — OFFICE VISIT (OUTPATIENT)
Dept: CARDIOLOGY CLINIC | Age: 84
End: 2024-05-14
Payer: MEDICARE

## 2024-05-14 VITALS
DIASTOLIC BLOOD PRESSURE: 73 MMHG | WEIGHT: 246 LBS | HEIGHT: 70 IN | BODY MASS INDEX: 35.22 KG/M2 | HEART RATE: 64 BPM | SYSTOLIC BLOOD PRESSURE: 115 MMHG

## 2024-05-14 DIAGNOSIS — I47.29 NSVT (NONSUSTAINED VENTRICULAR TACHYCARDIA) (HCC): ICD-10-CM

## 2024-05-14 DIAGNOSIS — R06.02 SOB (SHORTNESS OF BREATH) ON EXERTION: ICD-10-CM

## 2024-05-14 DIAGNOSIS — Z95.0 HISTORY OF CARDIAC PACEMAKER IN SITU: ICD-10-CM

## 2024-05-14 DIAGNOSIS — E78.00 PURE HYPERCHOLESTEROLEMIA: ICD-10-CM

## 2024-05-14 DIAGNOSIS — I50.32 CHRONIC DIASTOLIC HEART FAILURE (HCC): Primary | ICD-10-CM

## 2024-05-14 DIAGNOSIS — R42 ORTHOSTATIC DIZZINESS: ICD-10-CM

## 2024-05-14 DIAGNOSIS — I48.11 LONGSTANDING PERSISTENT ATRIAL FIBRILLATION (HCC): ICD-10-CM

## 2024-05-14 DIAGNOSIS — I10 ESSENTIAL HYPERTENSION: ICD-10-CM

## 2024-05-14 PROCEDURE — G8427 DOCREV CUR MEDS BY ELIG CLIN: HCPCS | Performed by: INTERNAL MEDICINE

## 2024-05-14 PROCEDURE — 3078F DIAST BP <80 MM HG: CPT | Performed by: INTERNAL MEDICINE

## 2024-05-14 PROCEDURE — 1036F TOBACCO NON-USER: CPT | Performed by: INTERNAL MEDICINE

## 2024-05-14 PROCEDURE — 1123F ACP DISCUSS/DSCN MKR DOCD: CPT | Performed by: INTERNAL MEDICINE

## 2024-05-14 PROCEDURE — G8417 CALC BMI ABV UP PARAM F/U: HCPCS | Performed by: INTERNAL MEDICINE

## 2024-05-14 PROCEDURE — 3074F SYST BP LT 130 MM HG: CPT | Performed by: INTERNAL MEDICINE

## 2024-05-14 PROCEDURE — 99214 OFFICE O/P EST MOD 30 MIN: CPT | Performed by: INTERNAL MEDICINE

## 2024-05-14 NOTE — PATIENT INSTRUCTIONS
Your CMA and LPN are Arielle and Ivania  Your Provider for Today: Dr. Celeste    You may receive a survey regarding the care you received during your visit.  Your input is valuable to us.  We encourage you to complete and return your survey.  We hope you will choose us in the future for your healthcare needs.

## 2024-05-14 NOTE — PROGRESS NOTES
and   the mean gradient was 6 mmHg.   Right ventricular systolic pressure measures 55 mmhg.      Signature      ----------------------------------------------------------------   Electronically signed by North Celeste MD (Interpreting   physician) on 06/15/2020 at 06:56 PM      EKG 12/14/23  Ventricular paced rhythm    Assessment  B/L PPP edema trace to +1 CONT LASIX    Pacemaker dependant under battery wtach     Diagnosis Orders   1. Chronic diastolic heart failure (HCC)  Basic Metabolic Panel    Lipid Panel    Magnesium    Hepatic Function Panel      2. Essential hypertension  Basic Metabolic Panel    Lipid Panel    Magnesium    Hepatic Function Panel      3. Longstanding persistent atrial fibrillation (HCC)  Basic Metabolic Panel    Lipid Panel    Magnesium    Hepatic Function Panel      4. Pure hypercholesterolemia  Basic Metabolic Panel    Lipid Panel    Magnesium    Hepatic Function Panel      5. NSVT (nonsustained ventricular tachycardia) (HCC) on B-B and lab okay  Basic Metabolic Panel    Lipid Panel    Magnesium    Hepatic Function Panel      6. History of cardiac pacemaker in situ  Basic Metabolic Panel    Lipid Panel    Magnesium    Hepatic Function Panel      7. SOB (shortness of breath) on exertion  Basic Metabolic Panel    Lipid Panel    Magnesium    Hepatic Function Panel      8. Hx of occasional Orthostatic dizziness  Basic Metabolic Panel    Lipid Panel    Magnesium    Hepatic Function Panel                  Previous admission DX  ASSESSMENT:   1. Acute diastolic congestive heart failure. The patient had   significant shortness of breath and basically having chronic   shortness of breath was not addressed and basically we will treat   him for that.   2. Status post pacemaker placement for sick sinus syndrome with slow   ventricular response, pause of 3.8 and complete heart block noted   in the cath lab while performing the procedure on A-pacing and no   V-capturing. There is no conduction. No

## 2024-05-15 ENCOUNTER — OFFICE VISIT (OUTPATIENT)
Dept: FAMILY MEDICINE CLINIC | Age: 84
End: 2024-05-15

## 2024-05-15 VITALS
BODY MASS INDEX: 35.22 KG/M2 | RESPIRATION RATE: 16 BRPM | WEIGHT: 246 LBS | SYSTOLIC BLOOD PRESSURE: 110 MMHG | HEART RATE: 72 BPM | HEIGHT: 70 IN | DIASTOLIC BLOOD PRESSURE: 78 MMHG

## 2024-05-15 DIAGNOSIS — Z00.00 MEDICARE ANNUAL WELLNESS VISIT, SUBSEQUENT: Primary | ICD-10-CM

## 2024-05-15 DIAGNOSIS — I10 ESSENTIAL HYPERTENSION: ICD-10-CM

## 2024-05-15 NOTE — PATIENT INSTRUCTIONS
Patient discharge instructions given to pt and pt verbalized understanding, 1 px given, no other needs at this time, and pt left in stable condition.      Narciso Rice RN  08/26/18 0872
sweating, shakiness, and anxiety.   Watch closely for changes in your health, and be sure to contact your doctor if:    You have a relapse.     You need more help or support to stop.   Where can you learn more?  Go to https://www.[employer].net/patientEd and enter H573 to learn more about \"Substance Use Disorder: Care Instructions.\"  Current as of: November 15, 2023               Content Version: 14.0  © 3148-7543 [employer].   Care instructions adapted under license by [employer]. If you have questions about a medical condition or this instruction, always ask your healthcare professional. [employer] disclaims any warranty or liability for your use of this information.           Learning About Stress  What is stress?     Stress is your body's response to a hard situation. Your body can have a physical, emotional, or mental response. Stress is a fact of life for most people, and it affects everyone differently. What causes stress for you may not be stressful for someone else.  A lot of things can cause stress. You may feel stress when you go on a job interview, take a test, or run a race. This kind of short-term stress is normal and even useful. It can help you if you need to work hard or react quickly. For example, stress can help you finish an important job on time.  Long-term stress is caused by ongoing stressful situations or events. Examples of long-term stress include long-term health problems, ongoing problems at work, or conflicts in your family. Long-term stress can harm your health.  How does stress affect your health?  When you are stressed, your body responds as though you are in danger. It makes hormones that speed up your heart, make you breathe faster, and give you a burst of energy. This is called the fight-or-flight stress response. If the stress is over quickly, your body goes back to normal and no harm is done.  But if stress happens too often or lasts too long, it can

## 2024-05-15 NOTE — PROGRESS NOTES
Abnormal    Obesity Interventions:  He watches the [email or web address]           Hearing Screen:  Do you or your family notice any trouble with your hearing that hasn't been managed with hearing aids?: (!) Yes    Interventions:  He wears the hearing aid    Vision Screen:  Do you have difficulty driving, watching TV, or doing any of your daily activities because of your eyesight?: (!) Yes  Have you had an eye exam within the past year?: (!) No  No results found.    Interventions:    He has an appointment soon    Safety:  Do you have any tripping hazards - loose or unsecured carpets or rugs?: (!) Yes  Do you have non-slip mats or non-slip surfaces or shower bars or grab bars in your shower or bathtub?: (!) No  Interventions:  Textured surface  and non slip rugs    ADL's:   Patient reports needing help with:  Select all that apply: (!) Walking/Balance  Select all that apply: (!) Laundry, Banking/Finances, Shopping, Food Preparation, Transportation  Interventions:  His wife helps                  Objective   Vitals:    05/15/24 1352   BP: 110/78   Site: Right Upper Arm   Position: Sitting   Cuff Size: Medium Adult   Pulse: 72   Resp: 16   Weight: 111.6 kg (246 lb)   Height: 1.778 m (5' 10\")      Body mass index is 35.3 kg/m².      General Appearance: alert and oriented to person, place and time, well-developed and well-nourished, in no acute distress  Skin: warm and dry, no rash or erythema  Head: normocephalic and atraumatic  Eyes: pupils equal, round, and reactive to light, extraocular eye movements intact, conjunctivae normal  ENT: tympanic membrane, external ear and ear canal normal bilaterally, oropharynx clear and moist with normal mucous membranes  Neck: neck supple and non tender without mass, no thyromegaly or thyroid nodules, no cervical lymphadenopathy   Pulmonary/Chest: clear to auscultation bilaterally- no wheezes, rales or rhonchi, normal air movement, no respiratory distress  Cardiovascular: normal rate, regular

## 2024-05-16 LAB
ALBUMIN: 4.5 G/DL (ref 3.5–5.2)
ALK PHOSPHATASE: 106 U/L (ref 40–125)
ALT SERPL-CCNC: 21 U/L (ref 5–50)
ANION GAP SERPL CALCULATED.3IONS-SCNC: 9 MEQ/L (ref 7–16)
AST SERPL-CCNC: 26 U/L (ref 9–50)
BILIRUB SERPL-MCNC: 0.7 MG/DL
BILIRUBIN DIRECT: 0.2 MG/DL (ref 0–0.3)
BUN BLDV-MCNC: 16 MG/DL (ref 8–23)
CALCIUM SERPL-MCNC: 9.9 MG/DL (ref 8.5–10.5)
CHLORIDE BLD-SCNC: 104 MEQ/L (ref 95–107)
CHOLESTEROL, TOTAL: 140 MG/DL
CHOLESTEROL/HDL RATIO: 3 RATIO
CO2: 27 MEQ/L (ref 19–31)
CREAT SERPL-MCNC: 1.11 MG/DL (ref 0.8–1.4)
EGFR IF NONAFRICAN AMERICAN: 67 ML/MIN/1.73
GLUCOSE: 99 MG/DL (ref 70–99)
HDLC SERPL-MCNC: 46 MG/DL
LDL CHOLESTEROL: 76 MG/DL
LDL/HDL RATIO: 1.7 RATIO
MAGNESIUM: 2.2 MG/DL (ref 1.6–2.6)
POTASSIUM SERPL-SCNC: 4.9 MEQ/L (ref 3.5–5.4)
SODIUM BLD-SCNC: 140 MEQ/L (ref 133–146)
TOTAL PROTEIN: 7.3 G/DL (ref 6.1–8.3)
TRIGL SERPL-MCNC: 90 MG/DL
VLDLC SERPL CALC-MCNC: 18 MG/DL

## 2024-06-03 RX ORDER — AMLODIPINE BESYLATE 10 MG/1
10 TABLET ORAL DAILY
Qty: 90 TABLET | Refills: 3 | Status: SHIPPED | OUTPATIENT
Start: 2024-06-03

## 2024-06-24 RX ORDER — METOPROLOL TARTRATE 50 MG/1
TABLET, FILM COATED ORAL
Qty: 270 TABLET | Refills: 3 | Status: SHIPPED | OUTPATIENT
Start: 2024-06-24

## 2024-06-24 RX ORDER — POTASSIUM CHLORIDE 750 MG/1
TABLET, EXTENDED RELEASE ORAL
Qty: 90 TABLET | Refills: 3 | Status: SHIPPED | OUTPATIENT
Start: 2024-06-24

## 2024-06-24 RX ORDER — SIMVASTATIN 10 MG
10 TABLET ORAL NIGHTLY
Qty: 90 TABLET | Refills: 3 | Status: SHIPPED | OUTPATIENT
Start: 2024-06-24

## 2024-06-24 RX ORDER — FUROSEMIDE 40 MG/1
40 TABLET ORAL DAILY
Qty: 90 TABLET | Refills: 3 | Status: SHIPPED | OUTPATIENT
Start: 2024-06-24

## 2024-06-26 ENCOUNTER — HOSPITAL ENCOUNTER (OUTPATIENT)
Dept: PULMONOLOGY | Age: 84
Discharge: HOME | End: 2024-06-26
Attending: INTERNAL MEDICINE
Payer: MEDICARE

## 2024-06-26 DIAGNOSIS — J98.4 RESTRICTIVE LUNG DISEASE: ICD-10-CM

## 2024-06-26 PROCEDURE — 94726 PLETHYSMOGRAPHY LUNG VOLUMES: CPT

## 2024-06-26 PROCEDURE — 94060 EVALUATION OF WHEEZING: CPT

## 2024-06-26 PROCEDURE — 94729 DIFFUSING CAPACITY: CPT

## 2024-06-26 RX ORDER — FAMCICLOVIR 500 MG/1
500 TABLET ORAL 3 TIMES DAILY PRN
Qty: 21 TABLET | Refills: 1 | Status: SHIPPED | OUTPATIENT
Start: 2024-06-26 | End: 2024-07-03

## 2024-07-03 ENCOUNTER — OFFICE VISIT (OUTPATIENT)
Dept: PULMONOLOGY | Age: 84
End: 2024-07-03
Payer: MEDICARE

## 2024-07-03 VITALS
DIASTOLIC BLOOD PRESSURE: 60 MMHG | WEIGHT: 249 LBS | HEIGHT: 70 IN | BODY MASS INDEX: 35.65 KG/M2 | SYSTOLIC BLOOD PRESSURE: 112 MMHG | OXYGEN SATURATION: 95 % | HEART RATE: 60 BPM | TEMPERATURE: 97.7 F

## 2024-07-03 DIAGNOSIS — J98.4 RESTRICTIVE LUNG DISEASE: Primary | ICD-10-CM

## 2024-07-03 DIAGNOSIS — E66.9 OBESITY (BMI 30-39.9): ICD-10-CM

## 2024-07-03 DIAGNOSIS — J94.1 FIBROTHORAX: ICD-10-CM

## 2024-07-03 DIAGNOSIS — G47.33 OSA ON CPAP: ICD-10-CM

## 2024-07-03 PROCEDURE — G8427 DOCREV CUR MEDS BY ELIG CLIN: HCPCS | Performed by: INTERNAL MEDICINE

## 2024-07-03 PROCEDURE — 1036F TOBACCO NON-USER: CPT | Performed by: INTERNAL MEDICINE

## 2024-07-03 PROCEDURE — 99214 OFFICE O/P EST MOD 30 MIN: CPT | Performed by: INTERNAL MEDICINE

## 2024-07-03 PROCEDURE — G8417 CALC BMI ABV UP PARAM F/U: HCPCS | Performed by: INTERNAL MEDICINE

## 2024-07-03 PROCEDURE — 1123F ACP DISCUSS/DSCN MKR DOCD: CPT | Performed by: INTERNAL MEDICINE

## 2024-07-03 PROCEDURE — 3078F DIAST BP <80 MM HG: CPT | Performed by: INTERNAL MEDICINE

## 2024-07-03 PROCEDURE — 3074F SYST BP LT 130 MM HG: CPT | Performed by: INTERNAL MEDICINE

## 2024-07-03 ASSESSMENT — ENCOUNTER SYMPTOMS
COUGH: 0
SINUS PAIN: 0
SHORTNESS OF BREATH: 0
WHEEZING: 0
SINUS PRESSURE: 0
ABDOMINAL PAIN: 0
DIARRHEA: 0
BLOOD IN STOOL: 0
CONSTIPATION: 1
EYE ITCHING: 0
EYE PAIN: 0
BACK PAIN: 1
SORE THROAT: 0
NAUSEA: 0
RHINORRHEA: 0

## 2024-07-03 NOTE — PROGRESS NOTES
Neck Circumference -   17  Mallampati - 3    Lung Nodule Screening     [] Qualifies    [x] Does not qualify   [] Declined    [] Completed  
artery is dilated. Aortocoronary calcifications. Aortic valve calcifications. Cardiac conduction device is seen with 2 leads. The leads are intact.     No significantly enlarged axillary lymph node. A mildly enlarged 1.4 cm prevascular lymph node is seen. Multiple visible subcentimeter mediastinal lymph nodes are seen that do not meet size criteria for pathological enlargement. Calcified granuloma is   seen in either lung.     The thyroid is not enlarged. Mild right gynecomastia.     Limited evaluation below the diaphragm is unremarkable.     Bones: The bones are demineralized. Degenerative changes of the thoracic spine. DISH of the thoracic spine..        IMPRESSION:  1. A small to moderate right pleural effusion with pleural thickening.  2. Rounded atelectasis is seen in the right lung base. Right lower lobe volume loss.  3. Moderate cardiomegaly.  4. Main pulmonary artery is dilated as can seen with pulmonary hypertension.  5. Mildly enlarged 1.4 cm prevascular lymph node.  6. Other findings as described above.           **This report has been created using voice recognition software.  It may contain minor errors which are inherent in voice recognition technology.**     Final report electronically signed by Dr Osiris Hall on 5/21/2023 8:59 PM     PFTs:        6 MWT:     Johnie Butler underwent a 6 min walk test. His initial O2  saturation was   100% and his baseline heart rate was 61  BPM. His post  O2  saturation was   93% and his post walk heart rate was   96 BPM.  Ambulated a distance of 540 ft.  Dyspnea score (Dirk): 3     Diagnosis Orders   1. Restrictive lung disease  [facility]    Full PFT Study With Bronchodilator      2. Obesity (BMI 30-39.9)  [facility]      3. Fibrothorax  [facility]      4. LOVE on CPAP          Most of the visit time was dedicated to his obstructive sleep apnea and new CPAP device, I review the data on the smart phone

## 2024-07-08 ENCOUNTER — OFFICE VISIT (OUTPATIENT)
Dept: PULMONOLOGY | Age: 84
End: 2024-07-08
Payer: MEDICARE

## 2024-07-08 VITALS
DIASTOLIC BLOOD PRESSURE: 62 MMHG | TEMPERATURE: 98.1 F | HEIGHT: 70 IN | BODY MASS INDEX: 35.28 KG/M2 | SYSTOLIC BLOOD PRESSURE: 118 MMHG | OXYGEN SATURATION: 96 % | HEART RATE: 61 BPM | WEIGHT: 246.4 LBS

## 2024-07-08 DIAGNOSIS — E66.9 OBESITY (BMI 30-39.9): ICD-10-CM

## 2024-07-08 DIAGNOSIS — G47.33 OSA ON CPAP: Primary | ICD-10-CM

## 2024-07-08 PROCEDURE — 1036F TOBACCO NON-USER: CPT | Performed by: NURSE PRACTITIONER

## 2024-07-08 PROCEDURE — G8427 DOCREV CUR MEDS BY ELIG CLIN: HCPCS | Performed by: NURSE PRACTITIONER

## 2024-07-08 PROCEDURE — 1123F ACP DISCUSS/DSCN MKR DOCD: CPT | Performed by: NURSE PRACTITIONER

## 2024-07-08 PROCEDURE — 3074F SYST BP LT 130 MM HG: CPT | Performed by: NURSE PRACTITIONER

## 2024-07-08 PROCEDURE — G8417 CALC BMI ABV UP PARAM F/U: HCPCS | Performed by: NURSE PRACTITIONER

## 2024-07-08 PROCEDURE — 99213 OFFICE O/P EST LOW 20 MIN: CPT | Performed by: NURSE PRACTITIONER

## 2024-07-08 PROCEDURE — 3078F DIAST BP <80 MM HG: CPT | Performed by: NURSE PRACTITIONER

## 2024-07-08 ASSESSMENT — ENCOUNTER SYMPTOMS
GASTROINTESTINAL NEGATIVE: 1
STRIDOR: 0
DIARRHEA: 0
EYES NEGATIVE: 1
NAUSEA: 0
ALLERGIC/IMMUNOLOGIC NEGATIVE: 1
RESPIRATORY NEGATIVE: 1
WHEEZING: 0
VOMITING: 0
CHEST TIGHTNESS: 0

## 2024-07-08 NOTE — PROGRESS NOTES
[facility]      Johnie Butler         [id number]  7/8/2024   Chief Complaint   Patient presents with    Follow-up     LOVE 2 month f/u  Using old mask now, with [employer] download.         Pt of Dr. Charlette CARSON Download:   Original or initial AHI: 19.6     Date of initial study: 8/6/13      Compliant  50%     Noncompliant 0 %     PAP Type CPAP Level  11   Avg Hrs/Day 11 hours 46 minutes  AHI: 0.2   Leaks : 95 th percentile: 22.1   Recorded compliance dates , 5/23/24  to 6/21/24   Machine/Mfg:   [x] ResMed    [] Respironics/Dreamstation   Interface:   [] Nasal    [x] Nasal pillows   [] FFM      Provider:      [] -HME     []Hira     [] Jan    [x] Cynthia    [] Danis               [] [facility]      [] Adaptive    [] [place]:      [] Other    Neck Size: 17  Mallampati 3  ESS:  1  SAQLI: 88    Here is a scan of the most recent download:              Presentation:   [facility]  Since the last visit, Johnie is using his new machine with same settings.   Patient currently on nasal pillow and was concerned about [employer] [place] showing a lot of leaking   Patient was given wrong pillows per him and his wife- due to facial abnormality the NP with the prongs do NOT work for him   AHI is well controlled today   Memory issues per wife   BMI 35  Awake and alert today     Progress History:   Since last visit any new medical issues? No  Any trouble with Machine No  Any new sleep medicines? no  Trouble Falling Asleep No  Trouble Staying Asleep No  Snoring no    Equipment issues:  The pressure is  acceptable, the mask is acceptable     Review of Systems -   Review of Systems   Constitutional: Negative.  Negative for chills, fever and unexpected weight change.   HENT: Negative.     Eyes: Negative.    Respiratory: Negative.  Negative for chest tightness, wheezing and stridor.    Cardiovascular:  Negative for chest

## 2024-08-08 ENCOUNTER — PROCEDURE VISIT (OUTPATIENT)
Dept: CARDIOLOGY CLINIC | Age: 84
End: 2024-08-08

## 2024-08-08 DIAGNOSIS — Z95.0 PACEMAKER: Primary | ICD-10-CM

## 2024-08-08 NOTE — PROGRESS NOTES
DR MCKEON PT   NXT [employer] DUAL PACER REMOTE   BATTERY 6.5 YRS REMAINING    VVIR     A PACED 0%  V PACED 99%    NO P WAVES MEASURED PER THE DEVICE   RV WAVES 5.6    ATRIAL IMPEDENCE 667  VENT IMPEDENCE 514    NO THRESHOLDS MEASURED PER THE DEVICE     KNOWN AFIB/ ELIQUIS

## 2024-08-26 RX ORDER — DOXEPIN HYDROCHLORIDE 25 MG/1
CAPSULE ORAL
Qty: 180 CAPSULE | Refills: 3 | Status: SHIPPED | OUTPATIENT
Start: 2024-08-26

## 2024-08-26 NOTE — TELEPHONE ENCOUNTER
Received refill request for Doxeepin. Medication was last ordered by Sulema Stevenson. Medication was last ordered on 10/17/23 with 3 refills.   Patient was last seen in the office 7/8/24. Patient has a scheduled follow up 7/7/25.   Medication needs to be sent to [facility].

## 2024-10-18 SDOH — ECONOMIC STABILITY: INCOME INSECURITY: HOW HARD IS IT FOR YOU TO PAY FOR THE VERY BASICS LIKE FOOD, HOUSING, MEDICAL CARE, AND HEATING?: NOT HARD AT ALL

## 2024-10-18 SDOH — ECONOMIC STABILITY: FOOD INSECURITY: WITHIN THE PAST 12 MONTHS, YOU WORRIED THAT YOUR FOOD WOULD RUN OUT BEFORE YOU GOT MONEY TO BUY MORE.: NEVER TRUE

## 2024-10-18 SDOH — ECONOMIC STABILITY: FOOD INSECURITY: WITHIN THE PAST 12 MONTHS, THE FOOD YOU BOUGHT JUST DIDN'T LAST AND YOU DIDN'T HAVE MONEY TO GET MORE.: NEVER TRUE

## 2024-10-21 ENCOUNTER — OFFICE VISIT (OUTPATIENT)
Dept: FAMILY MEDICINE CLINIC | Age: 84
End: 2024-10-21

## 2024-10-21 VITALS
RESPIRATION RATE: 20 BRPM | DIASTOLIC BLOOD PRESSURE: 60 MMHG | WEIGHT: 245.5 LBS | HEART RATE: 74 BPM | HEIGHT: 70 IN | BODY MASS INDEX: 35.15 KG/M2 | SYSTOLIC BLOOD PRESSURE: 108 MMHG

## 2024-10-21 DIAGNOSIS — I10 ESSENTIAL HYPERTENSION: Primary | ICD-10-CM

## 2024-10-21 PROCEDURE — 99213 OFFICE O/P EST LOW 20 MIN: CPT | Performed by: FAMILY MEDICINE

## 2024-10-21 PROCEDURE — G8417 CALC BMI ABV UP PARAM F/U: HCPCS | Performed by: FAMILY MEDICINE

## 2024-10-21 PROCEDURE — 1123F ACP DISCUSS/DSCN MKR DOCD: CPT | Performed by: FAMILY MEDICINE

## 2024-10-21 PROCEDURE — 1036F TOBACCO NON-USER: CPT | Performed by: FAMILY MEDICINE

## 2024-10-21 PROCEDURE — G8427 DOCREV CUR MEDS BY ELIG CLIN: HCPCS | Performed by: FAMILY MEDICINE

## 2024-10-21 ASSESSMENT — ENCOUNTER SYMPTOMS
SINUS PRESSURE: 0
CONSTIPATION: 0
SHORTNESS OF BREATH: 1

## 2024-10-21 NOTE — PROGRESS NOTES
Johnie Butler (:  1940) is a 84 y.o. male,Established patient, here for evaluation of the following chief complaint(s):  Congestive Heart Failure and Hypertension         Assessment & Plan  Essential hypertension            See me in late May         Subjective   HPI  There has been some post nasal drainage of clear mucus the past 3 weeks.  We reviewed the skin tag on the scrotum being unchanged  We reviewed the CT of the chest resulting ok  Review of Systems   Constitutional:  Negative for fatigue.   HENT:  Negative for sinus pressure.    Eyes:  Negative for visual disturbance.   Respiratory:  Positive for shortness of breath.    Cardiovascular:  Negative for chest pain.   Gastrointestinal:  Negative for constipation.   Genitourinary: Negative.    Musculoskeletal:  Negative for arthralgias.   Skin:  Negative for rash.   Neurological:  Negative for headaches.      The patient's medications, allergies, past medical problems, surgical, social, and family histories were reviewed and updated as needed.    Objective   Physical Exam  Constitutional:       Appearance: Normal appearance. He is well-developed.   HENT:      Head: Normocephalic and atraumatic.   Eyes:      General: No scleral icterus.     Conjunctiva/sclera: Conjunctivae normal.   Neck:      Trachea: No tracheal deviation.   Cardiovascular:      Rate and Rhythm: Normal rate and regular rhythm.      Heart sounds: Normal heart sounds.   Pulmonary:      Effort: Pulmonary effort is normal.      Breath sounds: Normal breath sounds.   Skin:     General: Skin is warm and dry.   Neurological:      General: No focal deficit present.      Mental Status: He is alert.   Psychiatric:         Behavior: Behavior normal.      The patient's medications, allergies, past medical problems, surgical, social, and family histories were reviewed and updated as needed.          An electronic signature was used to authenticate this note.    --Link Knight MD

## 2024-10-24 ENCOUNTER — NURSE ONLY (OUTPATIENT)
Dept: CARDIOLOGY CLINIC | Age: 84
End: 2024-10-24

## 2024-10-24 DIAGNOSIS — Z95.0 PACEMAKER: Primary | ICD-10-CM

## 2024-11-04 ENCOUNTER — TELEPHONE (OUTPATIENT)
Dept: FAMILY MEDICINE CLINIC | Age: 84
End: 2024-11-04

## 2024-11-04 NOTE — TELEPHONE ENCOUNTER
Spouse called stating that he is still C/O persistent productive cough and runny nose.  Stated he had it when he was here for his appt.  They are currently in [facility] and requesting an antibiotic to be called in    [facility] [place], [place]    Phone     Please call them  Rosemary back when done .

## 2024-11-05 RX ORDER — AZITHROMYCIN 250 MG/1
TABLET, FILM COATED ORAL
Qty: 6 TABLET | Refills: 0 | Status: SHIPPED | OUTPATIENT
Start: 2024-11-05 | End: 2024-11-15

## 2025-02-08 DIAGNOSIS — K29.50 ANTRAL GASTRITIS: ICD-10-CM

## 2025-02-10 PROCEDURE — 93296 REM INTERROG EVL PM/IDS: CPT | Performed by: INTERNAL MEDICINE

## 2025-02-10 PROCEDURE — 93294 REM INTERROG EVL PM/LDLS PM: CPT | Performed by: INTERNAL MEDICINE

## 2025-02-10 RX ORDER — OMEPRAZOLE 40 MG/1
40 CAPSULE, DELAYED RELEASE ORAL DAILY
Qty: 90 CAPSULE | Refills: 3 | Status: SHIPPED | OUTPATIENT
Start: 2025-02-10

## 2025-02-10 NOTE — TELEPHONE ENCOUNTER
Date of last visit:  10/21/2024  Date of next visit:  5/20/2025    Requested Prescriptions     Pending Prescriptions Disp Refills    omeprazole (PRILOSEC) 40 MG delayed release capsule [Pharmacy Med Name: Omeprazole 40 MG Oral Capsule Delayed Release] 90 capsule 3     Sig: TAKE 1 CAPSULE BY MOUTH DAILY

## 2025-04-14 RX ORDER — AMLODIPINE BESYLATE 10 MG/1
10 TABLET ORAL DAILY
Qty: 30 TABLET | Refills: 0 | Status: SHIPPED | OUTPATIENT
Start: 2025-04-14

## 2025-05-05 RX ORDER — POTASSIUM CHLORIDE 750 MG/1
10 TABLET, EXTENDED RELEASE ORAL DAILY
Qty: 90 TABLET | Refills: 0 | Status: SHIPPED | OUTPATIENT
Start: 2025-05-05

## 2025-05-05 RX ORDER — FUROSEMIDE 40 MG/1
40 TABLET ORAL DAILY
Qty: 90 TABLET | Refills: 0 | Status: SHIPPED | OUTPATIENT
Start: 2025-05-05

## 2025-05-05 RX ORDER — AMLODIPINE BESYLATE 10 MG/1
10 TABLET ORAL DAILY
Qty: 30 TABLET | Refills: 11 | OUTPATIENT
Start: 2025-05-05

## 2025-05-05 RX ORDER — AMLODIPINE BESYLATE 10 MG/1
10 TABLET ORAL DAILY
Qty: 90 TABLET | Refills: 0 | Status: SHIPPED | OUTPATIENT
Start: 2025-05-05

## 2025-05-05 RX ORDER — SIMVASTATIN 10 MG
10 TABLET ORAL NIGHTLY
Qty: 90 TABLET | Refills: 0 | Status: SHIPPED | OUTPATIENT
Start: 2025-05-05

## 2025-05-05 RX ORDER — METOPROLOL TARTRATE 50 MG
TABLET ORAL
Qty: 270 TABLET | Refills: 0 | Status: SHIPPED | OUTPATIENT
Start: 2025-05-05

## 2025-05-05 RX ORDER — APIXABAN 5 MG/1
5 TABLET, FILM COATED ORAL 2 TIMES DAILY
Qty: 180 TABLET | Refills: 0 | Status: SHIPPED | OUTPATIENT
Start: 2025-05-05

## 2025-05-06 ENCOUNTER — TELEPHONE (OUTPATIENT)
Dept: FAMILY MEDICINE CLINIC | Age: 85
End: 2025-05-06

## 2025-05-06 NOTE — TELEPHONE ENCOUNTER
Spouse called stating pt had a chest Xray done while in [place] and just got the results from Dr Isacc Cortes over Video appt.    He informed pt his Xray showed slight fluid in his right lung and would like Dr Knight to order a f/up chest Xray since pt is back in [place].    Pt has an appt here 5-20-25 but pt would like the Xray ordered and done before that per spouse.    Pt is going to try and send a copy of the Xray result from [place] into his [employer] through Email.    Brielle    Please call pt once addressed

## 2025-05-07 ENCOUNTER — HOSPITAL ENCOUNTER (OUTPATIENT)
Dept: GENERAL RADIOLOGY | Age: 85
Discharge: HOME | End: 2025-05-07
Payer: MEDICARE

## 2025-05-07 ENCOUNTER — RESULTS FOLLOW-UP (OUTPATIENT)
Dept: FAMILY MEDICINE CLINIC | Age: 85
End: 2025-05-07

## 2025-05-07 ENCOUNTER — HOSPITAL ENCOUNTER (OUTPATIENT)
Age: 85
Discharge: HOME | End: 2025-05-07
Payer: MEDICARE

## 2025-05-07 DIAGNOSIS — J90 PLEURAL EFFUSION: Primary | ICD-10-CM

## 2025-05-07 DIAGNOSIS — J90 PLEURAL EFFUSION: ICD-10-CM

## 2025-05-07 PROCEDURE — 71046 X-RAY EXAM CHEST 2 VIEWS: CPT

## 2025-05-07 NOTE — RESULT ENCOUNTER NOTE
Let his wife know that the CXR shows chronic changes in the right lung base and no change from 2023. No additional evaluation of that is needed.

## 2025-05-08 NOTE — TELEPHONE ENCOUNTER
----- Message from Dr. Link Knight MD sent at 5/7/2025  6:46 PM EDT -----  Let his wife know that the CXR shows chronic changes in the right lung base and no change from 2023. No additional evaluation of that is needed.

## 2025-05-13 ENCOUNTER — OFFICE VISIT (OUTPATIENT)
Dept: CARDIOLOGY CLINIC | Age: 85
End: 2025-05-13
Payer: MEDICARE

## 2025-05-13 VITALS
SYSTOLIC BLOOD PRESSURE: 131 MMHG | BODY MASS INDEX: 35.5 KG/M2 | HEART RATE: 59 BPM | DIASTOLIC BLOOD PRESSURE: 78 MMHG | HEIGHT: 70 IN | WEIGHT: 248 LBS

## 2025-05-13 DIAGNOSIS — I50.32 CHRONIC DIASTOLIC HEART FAILURE (HCC): Primary | ICD-10-CM

## 2025-05-13 DIAGNOSIS — I10 ESSENTIAL HYPERTENSION: ICD-10-CM

## 2025-05-13 DIAGNOSIS — I47.29 NSVT (NONSUSTAINED VENTRICULAR TACHYCARDIA) (HCC): ICD-10-CM

## 2025-05-13 DIAGNOSIS — R42 DIZZINESS: ICD-10-CM

## 2025-05-13 DIAGNOSIS — E78.00 PURE HYPERCHOLESTEROLEMIA: ICD-10-CM

## 2025-05-13 DIAGNOSIS — I48.11 LONGSTANDING PERSISTENT ATRIAL FIBRILLATION (HCC): ICD-10-CM

## 2025-05-13 DIAGNOSIS — R06.02 SOB (SHORTNESS OF BREATH) ON EXERTION: ICD-10-CM

## 2025-05-13 PROCEDURE — 3075F SYST BP GE 130 - 139MM HG: CPT | Performed by: INTERNAL MEDICINE

## 2025-05-13 PROCEDURE — 1036F TOBACCO NON-USER: CPT | Performed by: INTERNAL MEDICINE

## 2025-05-13 PROCEDURE — G8417 CALC BMI ABV UP PARAM F/U: HCPCS | Performed by: INTERNAL MEDICINE

## 2025-05-13 PROCEDURE — 1159F MED LIST DOCD IN RCRD: CPT | Performed by: INTERNAL MEDICINE

## 2025-05-13 PROCEDURE — G8427 DOCREV CUR MEDS BY ELIG CLIN: HCPCS | Performed by: INTERNAL MEDICINE

## 2025-05-13 PROCEDURE — 1123F ACP DISCUSS/DSCN MKR DOCD: CPT | Performed by: INTERNAL MEDICINE

## 2025-05-13 PROCEDURE — 99214 OFFICE O/P EST MOD 30 MIN: CPT | Performed by: INTERNAL MEDICINE

## 2025-05-13 PROCEDURE — 3078F DIAST BP <80 MM HG: CPT | Performed by: INTERNAL MEDICINE

## 2025-05-13 RX ORDER — FLUTICASONE PROPIONATE 50 MCG
1 SPRAY, SUSPENSION (ML) NASAL DAILY PRN
COMMUNITY
Start: 2025-02-20

## 2025-05-13 NOTE — PROGRESS NOTES
Originally Patient her to establish cardiologist. He was going to have umbilical hernia repair with Dr. Masterson yesterday but it was canceled due to new onset A-fib on EKG.    Was admitted at [place] for chf ex and RT pleural effusion and had thoracenthesis over 2 liter and doubled lasix and sent home march 2019        1 year follow up.    EKG done 1-.  Saw cardiologist in [place] as well    Occasional dizziness on standing in the morning on compression stocking for yrs    No wt gain     Denies cp, or palpitation  or edema    Leg edema better with compression stocking    Sob on exertion- chronic    Had  Pacer in oCt 31 2013    Could not tolerate the amiodarone( shaky, destressed and constipated ) and stopped- all resolved and feel good    He is back to [facility] few days after Cardioversion    Pacer site looks good    Patient Seen, Chart, Consults notes, Labs, Radiology studies reviewed.       Patient Active Problem List   Diagnosis    Rheumatic fever, 1950's    Class 1 obesity    Tobacco dependence in remission-quit 1986    Impotence    Gastritis    Gastroesophageal reflux disease    Umbilical hernia    Daytime somnolence    Restless legs    Dizziness- intermittent for the last few months new    History of cardiac pacemaker in situ    Chronic diastolic heart failure (HCC)    Obstructive sleep apnea syndrome    Essential hypertension    Pure hypercholesterolemia    Abdominal aortic aneurysm    SOB (shortness of breath) on exertion    NSVT (nonsustained ventricular tachycardia) (HCC) on B-B and lab okay    Longstanding persistent atrial fibrillation (HCC)    Hx of occasional Orthostatic dizziness       Past Surgical History:   Procedure Laterality Date    ANKLE ARTHROCENTESIS  2001    right    APPENDECTOMY  1952    BREAST SURGERY  1988    lumpectomy left    CARDIOVASCULAR STRESS TEST  6-27-13    CARDIOVASCULAR STRESS TEST  6-26-13    HOLTER     COLONOSCOPY  2009    COLONOSCOPY Left 10/22/2019    COLONOSCOPY DIAGNOSTIC

## 2025-05-14 LAB
BUN BLDV-MCNC: 15 MG/DL (ref 8–23)
CALCIUM SERPL-MCNC: 9.6 MG/DL (ref 8.6–10.5)
CHLORIDE BLD-SCNC: 103 MMOL/L (ref 96–107)
CO2: 23 MMOL/L (ref 18–32)
CREAT SERPL-MCNC: 1.11 MG/DL (ref 0.67–1.3)
EGFR IF NONAFRICAN AMERICAN: 66 ML/MIN/1.73M2
GLUCOSE: 87 MG/DL (ref 70–100)
MAGNESIUM: 2.4 MG/DL (ref 1.6–2.6)
POTASSIUM SERPL-SCNC: 4.8 MMOL/L (ref 3.5–5.4)
SODIUM BLD-SCNC: 144 MMOL/L (ref 135–148)

## 2025-05-20 ENCOUNTER — OFFICE VISIT (OUTPATIENT)
Dept: FAMILY MEDICINE CLINIC | Age: 85
End: 2025-05-20

## 2025-05-20 VITALS
HEIGHT: 70 IN | DIASTOLIC BLOOD PRESSURE: 68 MMHG | RESPIRATION RATE: 16 BRPM | BODY MASS INDEX: 35.52 KG/M2 | HEART RATE: 64 BPM | SYSTOLIC BLOOD PRESSURE: 118 MMHG | WEIGHT: 248.13 LBS

## 2025-05-20 DIAGNOSIS — Z00.00 MEDICARE ANNUAL WELLNESS VISIT, SUBSEQUENT: Primary | ICD-10-CM

## 2025-05-20 DIAGNOSIS — I10 ESSENTIAL HYPERTENSION: ICD-10-CM

## 2025-05-20 PROCEDURE — G8417 CALC BMI ABV UP PARAM F/U: HCPCS | Performed by: FAMILY MEDICINE

## 2025-05-20 PROCEDURE — 1036F TOBACCO NON-USER: CPT | Performed by: FAMILY MEDICINE

## 2025-05-20 PROCEDURE — G8427 DOCREV CUR MEDS BY ELIG CLIN: HCPCS | Performed by: FAMILY MEDICINE

## 2025-05-20 PROCEDURE — 1123F ACP DISCUSS/DSCN MKR DOCD: CPT | Performed by: FAMILY MEDICINE

## 2025-05-20 PROCEDURE — G0439 PPPS, SUBSEQ VISIT: HCPCS | Performed by: FAMILY MEDICINE

## 2025-05-20 PROCEDURE — 99213 OFFICE O/P EST LOW 20 MIN: CPT | Performed by: FAMILY MEDICINE

## 2025-05-20 SDOH — ECONOMIC STABILITY: FOOD INSECURITY: WITHIN THE PAST 12 MONTHS, YOU WORRIED THAT YOUR FOOD WOULD RUN OUT BEFORE YOU GOT MONEY TO BUY MORE.: NEVER TRUE

## 2025-05-20 SDOH — ECONOMIC STABILITY: FOOD INSECURITY: WITHIN THE PAST 12 MONTHS, THE FOOD YOU BOUGHT JUST DIDN'T LAST AND YOU DIDN'T HAVE MONEY TO GET MORE.: NEVER TRUE

## 2025-05-20 ASSESSMENT — LIFESTYLE VARIABLES
HOW MANY STANDARD DRINKS CONTAINING ALCOHOL DO YOU HAVE ON A TYPICAL DAY: 1 OR 2
HOW OFTEN DURING THE LAST YEAR HAVE YOU FOUND THAT YOU WERE NOT ABLE TO STOP DRINKING ONCE YOU HAD STARTED: NEVER
HOW OFTEN DURING THE LAST YEAR HAVE YOU HAD A FEELING OF GUILT OR REMORSE AFTER DRINKING: NEVER
HOW OFTEN DO YOU HAVE A DRINK CONTAINING ALCOHOL: 4 OR MORE TIMES A WEEK
HOW OFTEN DURING THE LAST YEAR HAVE YOU NEEDED AN ALCOHOLIC DRINK FIRST THING IN THE MORNING TO GET YOURSELF GOING AFTER A NIGHT OF HEAVY DRINKING: NEVER
HAS A RELATIVE, FRIEND, DOCTOR, OR ANOTHER HEALTH PROFESSIONAL EXPRESSED CONCERN ABOUT YOUR DRINKING OR SUGGESTED YOU CUT DOWN: NO
HOW OFTEN DURING THE LAST YEAR HAVE YOU FAILED TO DO WHAT WAS NORMALLY EXPECTED FROM YOU BECAUSE OF DRINKING: NEVER
HOW OFTEN DURING THE LAST YEAR HAVE YOU BEEN UNABLE TO REMEMBER WHAT HAPPENED THE NIGHT BEFORE BECAUSE YOU HAD BEEN DRINKING: NEVER
HAVE YOU OR SOMEONE ELSE BEEN INJURED AS A RESULT OF YOUR DRINKING: NO

## 2025-05-20 ASSESSMENT — PATIENT HEALTH QUESTIONNAIRE - PHQ9
1. LITTLE INTEREST OR PLEASURE IN DOING THINGS: NOT AT ALL
SUM OF ALL RESPONSES TO PHQ QUESTIONS 1-9: 0
2. FEELING DOWN, DEPRESSED OR HOPELESS: NOT AT ALL
SUM OF ALL RESPONSES TO PHQ QUESTIONS 1-9: 0

## 2025-05-20 NOTE — PATIENT INSTRUCTIONS
instruction, always ask your healthcare professional. [employer], [facility], disclaims any warranty or liability for your use of this information.    Personalized Preventive Plan for Johnie Butler - 5/20/2025  Medicare offers a range of preventive health benefits. Some of the tests and screenings are paid in full while other may be subject to a deductible, co-insurance, and/or copay.  Some of these benefits include a comprehensive review of your medical history including lifestyle, illnesses that may run in your family, and various assessments and screenings as appropriate.  After reviewing your medical record and screening and assessments performed today your provider may have ordered immunizations, labs, imaging, and/or referrals for you.  A list of these orders (if applicable) as well as your Preventive Care list are included within your After Visit Summary for your review.

## 2025-06-11 RX ORDER — SIMVASTATIN 10 MG
10 TABLET ORAL NIGHTLY
Qty: 90 TABLET | Refills: 3 | Status: SHIPPED | OUTPATIENT
Start: 2025-06-11

## 2025-06-11 RX ORDER — AMLODIPINE BESYLATE 10 MG/1
10 TABLET ORAL DAILY
Qty: 90 TABLET | Refills: 3 | Status: SHIPPED | OUTPATIENT
Start: 2025-06-11

## 2025-06-11 RX ORDER — APIXABAN 5 MG/1
5 TABLET, FILM COATED ORAL 2 TIMES DAILY
Qty: 180 TABLET | Refills: 3 | Status: SHIPPED | OUTPATIENT
Start: 2025-06-11

## 2025-06-11 RX ORDER — METOPROLOL TARTRATE 50 MG
TABLET ORAL
Qty: 270 TABLET | Refills: 3 | Status: SHIPPED | OUTPATIENT
Start: 2025-06-11

## 2025-06-11 RX ORDER — POTASSIUM CHLORIDE 750 MG/1
10 TABLET, EXTENDED RELEASE ORAL DAILY
Qty: 90 TABLET | Refills: 3 | Status: SHIPPED | OUTPATIENT
Start: 2025-06-11

## 2025-06-11 RX ORDER — FUROSEMIDE 40 MG/1
40 TABLET ORAL DAILY
Qty: 90 TABLET | Refills: 3 | Status: SHIPPED | OUTPATIENT
Start: 2025-06-11

## 2025-06-13 DIAGNOSIS — G47.00 INSOMNIA, UNSPECIFIED TYPE: Primary | ICD-10-CM

## 2025-06-13 RX ORDER — DOXEPIN HYDROCHLORIDE 25 MG/1
CAPSULE ORAL
Qty: 180 CAPSULE | Refills: 0 | Status: SHIPPED | OUTPATIENT
Start: 2025-06-13

## 2025-07-01 ENCOUNTER — HOSPITAL ENCOUNTER (OUTPATIENT)
Dept: PULMONOLOGY | Age: 85
Discharge: HOME | End: 2025-07-01
Attending: INTERNAL MEDICINE
Payer: MEDICARE

## 2025-07-01 DIAGNOSIS — J98.4 RESTRICTIVE LUNG DISEASE: ICD-10-CM

## 2025-07-01 PROCEDURE — 94060 EVALUATION OF WHEEZING: CPT

## 2025-07-01 PROCEDURE — 94726 PLETHYSMOGRAPHY LUNG VOLUMES: CPT

## 2025-07-01 PROCEDURE — 94729 DIFFUSING CAPACITY: CPT

## 2025-07-07 ENCOUNTER — RESULTS FOLLOW-UP (OUTPATIENT)
Dept: PULMONOLOGY | Age: 85
End: 2025-07-07

## 2025-07-07 NOTE — PROGRESS NOTES
10/31/13    PACEMAKER PLACEMENT  10/2013    SKIN BIOPSY  10/9/14    Dr Matias    SKIN BIOPSY  4/16/15    Dr Matias    SKIN CANCER DESTRUCTION  2015    SKIN CANCER EXCISION  2019    THORACENTESIS  03/10/2019    TONSILLECTOMY  1950    TRANSTHORACIC ECHOCARDIOGRAM  13    UPPER GASTROINTESTINAL ENDOSCOPY Left 10/22/2019    EGD ESOPHAGOGASTRODUODENOSCOPY performed by Dhaval Rojas MD at [facility]    VARICOCELECTOMY  1988    right     SOCIAL HISTORY:  Social History     Tobacco Use    Smoking status: Former     Current packs/day: 0.00     Average packs/day: 1 pack/day for 20.0 years (20.0 ttl pk-yrs)     Types: Cigarettes     Start date:      Quit date: 1986     Years since quittin.5    Smokeless tobacco: Former     Types: Chew    Tobacco comments:     Quit smoking    Vaping Use    Vaping status: Never Used   Substance Use Topics    Alcohol use: Yes     Alcohol/week: 0.0 standard drinks of alcohol     Comment: USALLY 1 DRINK DAILY    Drug use: Yes     Comment: CBD gummies     ALLERGIES:  Allergies   Allergen Reactions    Amiodarone      DEPRESSION, CONSTIPATION    Lamisil [Terbinafine] Itching    Lisinopril Cough    Morphine Other (See Comments)     hallucinations    Nickel Hives    Adhesive Tape Rash     FAMILY HISTORY:  Family History   Problem Relation Age of Onset    Dementia Mother     Cancer Mother         breast     CURRENT MEDICATIONS:  Current Outpatient Medications   Medication Sig Dispense Refill    doxepin (SINEQUAN) 25 MG capsule TAKE 1 TO 2 CAPSULES BY MOUTH AT NIGHT FOR INSOMNIA 180 capsule 0    metoprolol tartrate (LOPRESSOR) 50 MG tablet TAKE 1 AND 1/2 TABLETS BY MOUTH  TWICE DAILY 270 tablet 3    amLODIPine (NORVASC) 10 MG tablet TAKE 1 TABLET BY MOUTH DAILY 90 tablet 3    ELIQUIS 5 MG TABS tablet TAKE 1 TABLET BY MOUTH TWICE  DAILY 180 tablet 3    potassium chloride (KLOR-CON M) 10 MEQ extended release tablet TAKE 1 TABLET BY MOUTH DAILY 90 tablet 3    furosemide

## 2025-07-08 ENCOUNTER — OFFICE VISIT (OUTPATIENT)
Age: 85
End: 2025-07-08
Payer: MEDICARE

## 2025-07-08 VITALS
TEMPERATURE: 97.7 F | HEART RATE: 54 BPM | OXYGEN SATURATION: 95 % | BODY MASS INDEX: 34.89 KG/M2 | HEIGHT: 70 IN | SYSTOLIC BLOOD PRESSURE: 118 MMHG | WEIGHT: 243.7 LBS | DIASTOLIC BLOOD PRESSURE: 60 MMHG

## 2025-07-08 DIAGNOSIS — E66.9 OBESITY (BMI 30-39.9): ICD-10-CM

## 2025-07-08 DIAGNOSIS — G47.33 OSA ON CPAP: Primary | ICD-10-CM

## 2025-07-08 PROCEDURE — 1123F ACP DISCUSS/DSCN MKR DOCD: CPT

## 2025-07-08 PROCEDURE — 3078F DIAST BP <80 MM HG: CPT

## 2025-07-08 PROCEDURE — 99214 OFFICE O/P EST MOD 30 MIN: CPT

## 2025-07-08 PROCEDURE — G8427 DOCREV CUR MEDS BY ELIG CLIN: HCPCS

## 2025-07-08 PROCEDURE — G8417 CALC BMI ABV UP PARAM F/U: HCPCS

## 2025-07-08 PROCEDURE — 1159F MED LIST DOCD IN RCRD: CPT

## 2025-07-08 PROCEDURE — 3074F SYST BP LT 130 MM HG: CPT

## 2025-07-08 PROCEDURE — 1036F TOBACCO NON-USER: CPT

## 2025-07-08 ASSESSMENT — ENCOUNTER SYMPTOMS
SHORTNESS OF BREATH: 0
COUGH: 0
RHINORRHEA: 0
SORE THROAT: 0
WHEEZING: 0

## 2025-07-14 NOTE — PROGRESS NOTES
[facility]    Patient: DEEPA HENRY, 84 y.o.   : 1940  7/15/2025    Patient of Dr. Ovalles    Assessment/Plan     Assessment & Plan  1. Restrictive lung disease with decreased DLCO: Stable. 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 PET scan in 2023 with reassuring findings. Chest x-ray a few months ago showing chronic findings without new or acute issues. Pulmonary function tests (PFTs) stable, showing no signs of COPD but indicating some restriction due to lung scarring. Total lung capacity moderately declined from 63% to 55%. Diffusion capacity remains stable at 41%. Weight decreased by 5 pounds since 2024. Oxygen levels satisfactory.  - Continue follow-ups with cardiology.  - Monitor weight daily and monitor for edema. Continue compliance with diuretics  - Repeat PFT next year.  - Inform cardiologist if significant weight gain (more than a few pounds in a day or 5 pounds in a week).  - Contact immediately if breathing deteriorates.    2. Obesity. Stable  - Lost 5 lbs since last visit    2. Health maintenance.  - Administer pneumonia vaccine (Prevnar 20) and RSV vaccine. Pneumonia vaccine can be administered here or at the pharmacy. RSV vaccine covered by Medicare and can be obtained at the pharmacy. Patient prefers to receive both at pharmacy.    Follow-up  - Follow up in 1 year.       Reviewed preventative vaccinations    Advised patient to call office with any changes, questions, or concerns regarding respiratory status or issues with prescribed medications    Return in about 1 year (around 7/15/2026) for RLD with PFT .     The patient (or guardian, if applicable) and other individuals in attendance with the patient were advised that Artificial Intelligence will be utilized during this visit to record, process the conversation to generate a clinical note, and support improvement of the AI

## 2025-07-15 ENCOUNTER — OFFICE VISIT (OUTPATIENT)
Dept: PULMONOLOGY | Age: 85
End: 2025-07-15
Payer: MEDICARE

## 2025-07-15 VITALS
SYSTOLIC BLOOD PRESSURE: 116 MMHG | BODY MASS INDEX: 34.99 KG/M2 | WEIGHT: 244.4 LBS | OXYGEN SATURATION: 96 % | TEMPERATURE: 97.3 F | HEART RATE: 60 BPM | DIASTOLIC BLOOD PRESSURE: 68 MMHG | HEIGHT: 70 IN

## 2025-07-15 DIAGNOSIS — E66.9 OBESITY (BMI 30-39.9): ICD-10-CM

## 2025-07-15 DIAGNOSIS — R94.2 DECREASED DIFFUSION CAPACITY OF LUNG: ICD-10-CM

## 2025-07-15 DIAGNOSIS — J98.4 RESTRICTIVE LUNG DISEASE: Primary | ICD-10-CM

## 2025-07-15 DIAGNOSIS — J94.1 FIBROTHORAX: ICD-10-CM

## 2025-07-15 PROCEDURE — G8417 CALC BMI ABV UP PARAM F/U: HCPCS

## 2025-07-15 PROCEDURE — 3074F SYST BP LT 130 MM HG: CPT

## 2025-07-15 PROCEDURE — 1160F RVW MEDS BY RX/DR IN RCRD: CPT

## 2025-07-15 PROCEDURE — 99213 OFFICE O/P EST LOW 20 MIN: CPT

## 2025-07-15 PROCEDURE — 3078F DIAST BP <80 MM HG: CPT

## 2025-07-15 PROCEDURE — 1123F ACP DISCUSS/DSCN MKR DOCD: CPT

## 2025-07-15 PROCEDURE — 1159F MED LIST DOCD IN RCRD: CPT

## 2025-07-15 PROCEDURE — G8427 DOCREV CUR MEDS BY ELIG CLIN: HCPCS

## 2025-07-15 PROCEDURE — 1036F TOBACCO NON-USER: CPT

## (undated) DEVICE — KIT INF CTRL 2OZ LUB TBNG L12FT DBL END BRSH SYR OP4

## (undated) DEVICE — SET ADMIN 25ML L117IN PMP MOD CK VLV RLER CLMP 2 SMRTSITE

## (undated) DEVICE — CO2 CANNULA,SUPERSOFT, ADLT,7'O2,7'CO2: Brand: MEDLINE

## (undated) DEVICE — CATHETER ETER IV 22GA L1IN POLYUR STR RADPQ INTROCAN SFTY

## (undated) DEVICE — IV START KIT: Brand: MEDLINE INDUSTRIES, INC.

## (undated) DEVICE — CONMED SCOPE SAVER BITE BLOCK, 20X27 MM: Brand: SCOPE SAVER

## (undated) DEVICE — TUBING IV STOPCOCK 48 CM 3 W

## (undated) DEVICE — SOLUTION IV IRRIG WATER 500ML POUR BRL ST 2F7113

## (undated) DEVICE — SET LNR RED GRN W/ BASE CLEANASCOPE

## (undated) DEVICE — SOLUTION IV 1000ML 0.45% SOD CHL PH 5 INJ USP VIAFLX PLAS